# Patient Record
Sex: MALE | Race: WHITE | NOT HISPANIC OR LATINO | Employment: OTHER | URBAN - METROPOLITAN AREA
[De-identification: names, ages, dates, MRNs, and addresses within clinical notes are randomized per-mention and may not be internally consistent; named-entity substitution may affect disease eponyms.]

---

## 2018-07-29 ENCOUNTER — OFFICE VISIT (OUTPATIENT)
Dept: URGENT CARE | Facility: CLINIC | Age: 38
End: 2018-07-29
Payer: MEDICARE

## 2018-07-29 VITALS
OXYGEN SATURATION: 98 % | SYSTOLIC BLOOD PRESSURE: 125 MMHG | WEIGHT: 129 LBS | DIASTOLIC BLOOD PRESSURE: 87 MMHG | TEMPERATURE: 99 F | HEART RATE: 90 BPM | BODY MASS INDEX: 19.05 KG/M2

## 2018-07-29 DIAGNOSIS — L02.416 CELLULITIS AND ABSCESS OF LEFT LEG: Primary | ICD-10-CM

## 2018-07-29 DIAGNOSIS — L03.116 CELLULITIS AND ABSCESS OF LEFT LEG: Primary | ICD-10-CM

## 2018-07-29 DIAGNOSIS — Z72.0 TOBACCO USE: ICD-10-CM

## 2018-07-29 PROCEDURE — 3074F SYST BP LT 130 MM HG: CPT | Mod: S$GLB,,, | Performed by: FAMILY MEDICINE

## 2018-07-29 PROCEDURE — 3008F BODY MASS INDEX DOCD: CPT | Mod: S$GLB,,, | Performed by: FAMILY MEDICINE

## 2018-07-29 PROCEDURE — 99203 OFFICE O/P NEW LOW 30 MIN: CPT | Mod: S$GLB,,, | Performed by: FAMILY MEDICINE

## 2018-07-29 PROCEDURE — 3079F DIAST BP 80-89 MM HG: CPT | Mod: S$GLB,,, | Performed by: FAMILY MEDICINE

## 2018-07-29 RX ORDER — IBUPROFEN 800 MG/1
800 TABLET ORAL EVERY 8 HOURS PRN
Qty: 60 TABLET | Refills: 2 | Status: SHIPPED | OUTPATIENT
Start: 2018-07-29 | End: 2018-10-20 | Stop reason: SDUPTHER

## 2018-07-29 RX ORDER — SULFAMETHOXAZOLE AND TRIMETHOPRIM 800; 160 MG/1; MG/1
1 TABLET ORAL 2 TIMES DAILY
Qty: 20 TABLET | Refills: 0 | Status: SHIPPED | OUTPATIENT
Start: 2018-07-29 | End: 2018-08-08

## 2018-07-29 RX ORDER — MUPIROCIN 20 MG/G
OINTMENT TOPICAL
Qty: 22 G | Refills: 1 | Status: SHIPPED | OUTPATIENT
Start: 2018-07-29 | End: 2018-10-20

## 2018-07-29 RX ORDER — CHLORHEXIDINE GLUCONATE 40 MG/ML
SOLUTION TOPICAL DAILY PRN
Refills: 0 | COMMUNITY
Start: 2018-07-29 | End: 2018-10-20

## 2018-07-29 RX ORDER — CLINDAMYCIN PHOSPHATE 150 MG/ML
300 INJECTION, SOLUTION INTRAVENOUS
Status: COMPLETED | OUTPATIENT
Start: 2018-07-29 | End: 2018-07-29

## 2018-07-29 RX ADMIN — CLINDAMYCIN PHOSPHATE 300 MG: 150 INJECTION, SOLUTION INTRAVENOUS at 11:07

## 2018-07-29 NOTE — PATIENT INSTRUCTIONS
Go to the Emergency department for any worsening or failure to improve, otherwise follow up with your primary care provider.    Discharge Instructions for Cellulitis  You have been diagnosed with cellulitis. This is an infection in the deepest layer of the skin. In some cases, the infection also affects the muscle. Cellulitis is caused by bacteria. The bacteria can enter the body through broken skin. This can happen with a cut, scratch, animal bite, or an insect bite that has been scratched. You may have been treated in the hospital with antibiotics and fluids. You will likely be given a prescription for antibiotics to take at home. This sheet will help you take care of yourself at home.  Home care  When you are home:  · Take the prescribed antibiotic medicine you are given as directed until it is gone. Take it even if you feel better. It treats the infection and stops it from returning. Not taking all the medicine can make future infections hard to treat.  · Keep the infected area clean.  · When possible, raise the infected area above the level of your heart. This helps keep swelling down.  · Talk with your healthcare provider if you are in pain. Ask what kind of over-the-counter medicine you can take for pain.  · Apply clean bandages as advised.  · Take your temperature once a day for a week.  · Wash your hands often to prevent spreading the infection.  In the future, wash your hands before and after you touch cuts, scratches, or bandages. This will help prevent infection.   When to call your healthcare provider  Call your healthcare provider immediately if you have any of the following:  · Difficulty or pain when moving the joints above or below the infected area  · Discharge or pus draining from the area  · Fever of 100.4°F (38°C) or higher, or as directed by your healthcare provider  · Pain that gets worse in or around the infected   · Redness that gets worse in or around the infected area, particularly if  the area of redness expands to a wider area  · Shaking chills  · Swelling of the infected area  · Vomiting   Date Last Reviewed: 8/1/2016  © 8474-4552 [employer]. [address] [place] [address], [place] [address]. All rights reserved. This information is not intended as a substitute for professional medical care. Always follow your healthcare professional's instructions.

## 2018-07-29 NOTE — PROGRESS NOTES
Subjective:       Patient ID: Marcial Thomas is a 37 y.o. male.    Vitals:  weight is 58.5 kg (129 lb). His tympanic temperature is 99 °F (37.2 °C). His blood pressure is 125/87 and his pulse is 90. His oxygen saturation is 98%.     Chief Complaint: Abscess    Pt states that he some redness and swelling of the left calf and is painful.      Abscess   Chronicity:  NewProgression Since Onset: gradually worsening  Location:  Leg (left leg)  Associated Symptoms: no fever, no chills  Characteristics: painful, redness and swelling    Pain Scale:  7/10  Treatments Tried:  Nothing    Review of Systems   Constitution: Negative for chills and fever.   HENT: Negative for sore throat.    Eyes: Negative for blurred vision.   Cardiovascular: Negative for chest pain.   Respiratory: Negative for shortness of breath.    Skin: Negative for rash.   Musculoskeletal: Negative for back pain and joint pain.   Gastrointestinal: Negative for abdominal pain, diarrhea, nausea and vomiting.   Neurological: Negative for headaches.   Psychiatric/Behavioral: The patient is not nervous/anxious.    All other systems reviewed and are negative.      Objective:      Physical Exam   Constitutional: He is oriented to person, place, and time. He appears well-developed.  Non-toxic appearance.   HENT:   Head: Normocephalic.   Nose: Nose normal.   Mouth/Throat: Oropharynx is clear and moist.   Eyes: Conjunctivae and EOM are normal. Pupils are equal, round, and reactive to light.   Cardiovascular: Normal rate, regular rhythm, intact distal pulses and normal pulses.    Pulmonary/Chest: Breath sounds normal.   Abdominal: Bowel sounds are normal.   Musculoskeletal:        Left lower leg: He exhibits tenderness.   Negative homans sign   Neurological: He is alert and oriented to person, place, and time.   Skin: There is erythema.   Abscess/pre-abscess on left posterior lower leg (upper calf) with erythema and ttp.       Assessment:       1. Cellulitis and  abscess of left leg    2. Tobacco use        Plan:         Cellulitis and abscess of left leg  -     clindamycin injection 300 mg; Inject 2 mLs (300 mg total) into the muscle one time.  -     mupirocin (BACTROBAN) 2 % ointment; Apply to affected area 3 times daily  Dispense: 22 g; Refill: 1  -     sulfamethoxazole-trimethoprim 800-160mg (BACTRIM DS) 800-160 mg Tab; Take 1 tablet by mouth 2 (two) times daily. for 10 days  Dispense: 20 tablet; Refill: 0  -     ibuprofen (ADVIL,MOTRIN) 800 MG tablet; Take 1 tablet (800 mg total) by mouth every 8 (eight) hours as needed for Pain.  Dispense: 60 tablet; Refill: 2  -     chlorhexidine (HIBICLENS) 4 % external liquid; Apply topically daily as needed.; Refill: 0    Tobacco use  -     Ambulatory referral to Smoking Cessation Program      Patient Instructions       Go to the Emergency department for any worsening or failure to improve, otherwise follow up with your primary care provider.    Discharge Instructions for Cellulitis  You have been diagnosed with cellulitis. This is an infection in the deepest layer of the skin. In some cases, the infection also affects the muscle. Cellulitis is caused by bacteria. The bacteria can enter the body through broken skin. This can happen with a cut, scratch, animal bite, or an insect bite that has been scratched. You may have been treated in the hospital with antibiotics and fluids. You will likely be given a prescription for antibiotics to take at home. This sheet will help you take care of yourself at home.  Home care  When you are home:  · Take the prescribed antibiotic medicine you are given as directed until it is gone. Take it even if you feel better. It treats the infection and stops it from returning. Not taking all the medicine can make future infections hard to treat.  · Keep the infected area clean.  · When possible, raise the infected area above the level of your heart. This helps keep swelling down.  · Talk with your  healthcare provider if you are in pain. Ask what kind of over-the-counter medicine you can take for pain.  · Apply clean bandages as advised.  · Take your temperature once a day for a week.  · Wash your hands often to prevent spreading the infection.  In the future, wash your hands before and after you touch cuts, scratches, or bandages. This will help prevent infection.   When to call your healthcare provider  Call your healthcare provider immediately if you have any of the following:  · Difficulty or pain when moving the joints above or below the infected area  · Discharge or pus draining from the area  · Fever of 100.4°F (38°C) or higher, or as directed by your healthcare provider  · Pain that gets worse in or around the infected   · Redness that gets worse in or around the infected area, particularly if the area of redness expands to a wider area  · Shaking chills  · Swelling of the infected area  · Vomiting   Date Last Reviewed: 8/1/2016  © 0613-0893 [employer]. [address] [place] [address], [place] [address]. All rights reserved. This information is not intended as a substitute for professional medical care. Always follow your healthcare professional's instructions.
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
## 2018-10-20 ENCOUNTER — OFFICE VISIT (OUTPATIENT)
Dept: FAMILY MEDICINE | Facility: CLINIC | Age: 38
End: 2018-10-20
Payer: MEDICARE

## 2018-10-20 ENCOUNTER — HOSPITAL ENCOUNTER (OUTPATIENT)
Dept: RADIOLOGY | Facility: HOSPITAL | Age: 38
Discharge: HOME | End: 2018-10-20
Attending: INTERNAL MEDICINE
Payer: MEDICARE

## 2018-10-20 VITALS
BODY MASS INDEX: 18.77 KG/M2 | HEART RATE: 88 BPM | DIASTOLIC BLOOD PRESSURE: 88 MMHG | SYSTOLIC BLOOD PRESSURE: 110 MMHG | OXYGEN SATURATION: 98 % | WEIGHT: 126.75 LBS | HEIGHT: 69 IN | TEMPERATURE: 98 F

## 2018-10-20 DIAGNOSIS — Z76.89 ENCOUNTER TO ESTABLISH CARE: Primary | ICD-10-CM

## 2018-10-20 DIAGNOSIS — Z13.1 SCREENING FOR DIABETES MELLITUS: ICD-10-CM

## 2018-10-20 DIAGNOSIS — Z13.6 ENCOUNTER FOR SCREENING FOR CARDIOVASCULAR DISORDERS: ICD-10-CM

## 2018-10-20 DIAGNOSIS — M54.6 CHRONIC MIDLINE THORACIC BACK PAIN: ICD-10-CM

## 2018-10-20 DIAGNOSIS — G89.29 CHRONIC MIDLINE THORACIC BACK PAIN: ICD-10-CM

## 2018-10-20 DIAGNOSIS — R07.81 RIB PAIN ON RIGHT SIDE: ICD-10-CM

## 2018-10-20 PROCEDURE — 3079F DIAST BP 80-89 MM HG: CPT | Mod: ,,, | Performed by: INTERNAL MEDICINE

## 2018-10-20 PROCEDURE — 72070 X-RAY EXAM THORAC SPINE 2VWS: CPT | Mod: TC,FY,PO

## 2018-10-20 PROCEDURE — 3008F BODY MASS INDEX DOCD: CPT | Mod: ,,, | Performed by: INTERNAL MEDICINE

## 2018-10-20 PROCEDURE — 71100 X-RAY EXAM RIBS UNI 2 VIEWS: CPT | Mod: 26,,, | Performed by: RADIOLOGY

## 2018-10-20 PROCEDURE — 72070 X-RAY EXAM THORAC SPINE 2VWS: CPT | Mod: 26,,, | Performed by: RADIOLOGY

## 2018-10-20 PROCEDURE — 71100 X-RAY EXAM RIBS UNI 2 VIEWS: CPT | Mod: TC,FY,PO

## 2018-10-20 PROCEDURE — 99204 OFFICE O/P NEW MOD 45 MIN: CPT | Mod: S$PBB,,, | Performed by: INTERNAL MEDICINE

## 2018-10-20 PROCEDURE — 99213 OFFICE O/P EST LOW 20 MIN: CPT | Mod: PBBFAC,PO | Performed by: INTERNAL MEDICINE

## 2018-10-20 PROCEDURE — 99999 PR PBB SHADOW E&M-EST. PATIENT-LVL III: CPT | Mod: PBBFAC,,, | Performed by: INTERNAL MEDICINE

## 2018-10-20 PROCEDURE — 3074F SYST BP LT 130 MM HG: CPT | Mod: ,,, | Performed by: INTERNAL MEDICINE

## 2018-10-20 RX ORDER — METHYLPREDNISOLONE 4 MG/1
TABLET ORAL
Qty: 1 PACKAGE | Refills: 0 | Status: SHIPPED | OUTPATIENT
Start: 2018-10-20 | End: 2018-10-20 | Stop reason: SDUPTHER

## 2018-10-20 RX ORDER — CYCLOBENZAPRINE HCL 10 MG
10 TABLET ORAL 3 TIMES DAILY PRN
Qty: 14 TABLET | Refills: 0 | Status: SHIPPED | OUTPATIENT
Start: 2018-10-20 | End: 2018-10-31 | Stop reason: SDUPTHER

## 2018-10-20 RX ORDER — METHYLPREDNISOLONE 4 MG/1
TABLET ORAL
Qty: 1 PACKAGE | Refills: 0 | Status: SHIPPED | OUTPATIENT
Start: 2018-10-20 | End: 2018-10-25 | Stop reason: ALTCHOICE

## 2018-10-23 ENCOUNTER — TELEPHONE (OUTPATIENT)
Dept: FAMILY MEDICINE | Facility: CLINIC | Age: 38
End: 2018-10-23

## 2018-10-23 NOTE — TELEPHONE ENCOUNTER
----- Message from Javad Méndez MD sent at 10/22/2018  9:17 PM CDT -----  CBC with high WBC/PMN. Active smoker. Rib pain - acute phase reactant?  Mild elevated total protein though the albumin was OK. Very slight  TSH low though FT4 WNL no previous to compare  Lipid actually pretty good.    Please call pt - white blood cell count was high - unsure if this means anything. Would repeat this in 3 months. Important to follow this up.  Thyroid was slightly abnormal - would repeat this in 3 months too  Follow up 3 months.

## 2018-10-24 ENCOUNTER — HOSPITAL ENCOUNTER (EMERGENCY)
Facility: HOSPITAL | Age: 38
Discharge: LEFT BEFORE DISCHARGE | End: 2018-10-25
Attending: EMERGENCY MEDICINE
Payer: MEDICARE

## 2018-10-24 VITALS
RESPIRATION RATE: 16 BRPM | HEART RATE: 105 BPM | TEMPERATURE: 99 F | BODY MASS INDEX: 18.66 KG/M2 | DIASTOLIC BLOOD PRESSURE: 92 MMHG | WEIGHT: 126 LBS | HEIGHT: 69 IN | OXYGEN SATURATION: 98 % | SYSTOLIC BLOOD PRESSURE: 147 MMHG

## 2018-10-24 DIAGNOSIS — L02.416 CELLULITIS AND ABSCESS OF LEFT LOWER EXTREMITY: Primary | ICD-10-CM

## 2018-10-24 DIAGNOSIS — L03.116 CELLULITIS AND ABSCESS OF LEFT LOWER EXTREMITY: Primary | ICD-10-CM

## 2018-10-24 PROCEDURE — 85025 COMPLETE CBC W/AUTO DIFF WBC: CPT

## 2018-10-24 PROCEDURE — 99283 EMERGENCY DEPT VISIT LOW MDM: CPT | Mod: 25

## 2018-10-24 NOTE — TELEPHONE ENCOUNTER
Spoke with patient and was informed of charted test result with recommendation. Patient request a call back reminder for 3 months f/u with labs. Done.

## 2018-10-25 ENCOUNTER — OFFICE VISIT (OUTPATIENT)
Dept: FAMILY MEDICINE | Facility: CLINIC | Age: 38
End: 2018-10-25
Payer: MEDICARE

## 2018-10-25 VITALS
DIASTOLIC BLOOD PRESSURE: 84 MMHG | WEIGHT: 96.31 LBS | TEMPERATURE: 98 F | HEART RATE: 92 BPM | BODY MASS INDEX: 14.27 KG/M2 | OXYGEN SATURATION: 97 % | HEIGHT: 69 IN | SYSTOLIC BLOOD PRESSURE: 130 MMHG

## 2018-10-25 DIAGNOSIS — F11.90 OPIOID USE DISORDER: ICD-10-CM

## 2018-10-25 DIAGNOSIS — L02.416 ABSCESS OF LEFT LEG: Primary | ICD-10-CM

## 2018-10-25 LAB
BASOPHILS # BLD AUTO: 0.03 K/UL
BASOPHILS NFR BLD: 0.1 %
CRP SERPL-MCNC: 283.6 MG/L
DIFFERENTIAL METHOD: ABNORMAL
EOSINOPHIL # BLD AUTO: 0 K/UL
EOSINOPHIL NFR BLD: 0.2 %
ERYTHROCYTE [DISTWIDTH] IN BLOOD BY AUTOMATED COUNT: 15.3 %
ERYTHROCYTE [SEDIMENTATION RATE] IN BLOOD BY WESTERGREN METHOD: 20 MM/HR
HCT VFR BLD AUTO: 46.1 %
HGB BLD-MCNC: 15.7 G/DL
HIV1+2 IGG SERPL QL IA.RAPID: NEGATIVE
LACTATE SERPL-SCNC: 2.1 MMOL/L
LYMPHOCYTES # BLD AUTO: 1.7 K/UL
LYMPHOCYTES NFR BLD: 7.9 %
MCH RBC QN AUTO: 28.6 PG
MCHC RBC AUTO-ENTMCNC: 34.1 G/DL
MCV RBC AUTO: 84 FL
MONOCYTES # BLD AUTO: 1.8 K/UL
MONOCYTES NFR BLD: 8.4 %
NEUTROPHILS # BLD AUTO: 18.3 K/UL
NEUTROPHILS NFR BLD: 83.4 %
PLATELET # BLD AUTO: 300 K/UL
PMV BLD AUTO: 10.2 FL
RBC # BLD AUTO: 5.49 M/UL
WBC # BLD AUTO: 21.93 K/UL

## 2018-10-25 PROCEDURE — 86703 HIV-1/HIV-2 1 RESULT ANTBDY: CPT

## 2018-10-25 PROCEDURE — 86140 C-REACTIVE PROTEIN: CPT

## 2018-10-25 PROCEDURE — 3079F DIAST BP 80-89 MM HG: CPT | Mod: ,,, | Performed by: INTERNAL MEDICINE

## 2018-10-25 PROCEDURE — 3008F BODY MASS INDEX DOCD: CPT | Mod: ,,, | Performed by: INTERNAL MEDICINE

## 2018-10-25 PROCEDURE — 83605 ASSAY OF LACTIC ACID: CPT

## 2018-10-25 PROCEDURE — 96372 THER/PROPH/DIAG INJ SC/IM: CPT | Mod: PBBFAC,PO

## 2018-10-25 PROCEDURE — 99214 OFFICE O/P EST MOD 30 MIN: CPT | Mod: PBBFAC,25,PO | Performed by: INTERNAL MEDICINE

## 2018-10-25 PROCEDURE — 85652 RBC SED RATE AUTOMATED: CPT

## 2018-10-25 PROCEDURE — 99999 PR PBB SHADOW E&M-EST. PATIENT-LVL IV: CPT | Mod: PBBFAC,,, | Performed by: INTERNAL MEDICINE

## 2018-10-25 PROCEDURE — 87040 BLOOD CULTURE FOR BACTERIA: CPT

## 2018-10-25 PROCEDURE — 3075F SYST BP GE 130 - 139MM HG: CPT | Mod: ,,, | Performed by: INTERNAL MEDICINE

## 2018-10-25 PROCEDURE — 87070 CULTURE OTHR SPECIMN AEROBIC: CPT | Mod: 59

## 2018-10-25 PROCEDURE — 99214 OFFICE O/P EST MOD 30 MIN: CPT | Mod: S$PBB,,, | Performed by: INTERNAL MEDICINE

## 2018-10-25 RX ORDER — CEPHALEXIN 500 MG/1
500 CAPSULE ORAL EVERY 6 HOURS
Qty: 56 CAPSULE | Refills: 0 | Status: SHIPPED | OUTPATIENT
Start: 2018-10-25 | End: 2018-11-08

## 2018-10-25 RX ORDER — SULFAMETHOXAZOLE AND TRIMETHOPRIM 800; 160 MG/1; MG/1
2 TABLET ORAL 2 TIMES DAILY
Qty: 56 TABLET | Refills: 0 | Status: SHIPPED | OUTPATIENT
Start: 2018-10-25 | End: 2018-11-08

## 2018-10-25 RX ORDER — CEFTRIAXONE 1 G/1
1 INJECTION, POWDER, FOR SOLUTION INTRAMUSCULAR; INTRAVENOUS
Status: COMPLETED | OUTPATIENT
Start: 2018-10-25 | End: 2018-10-25

## 2018-10-25 RX ADMIN — CEFTRIAXONE SODIUM 1 G: 1 INJECTION, POWDER, FOR SOLUTION INTRAMUSCULAR; INTRAVENOUS at 04:10

## 2018-10-25 NOTE — ED TRIAGE NOTES
Patient presents to the ED via personal transportation with wife. Patient reports redness, swelling, and drainage to left lower leg x 3 days. Patient reports that patient injecting his leg 1- 2 weeks ago with pain medication. Patient states needle was new, but he did not clean leg prior to doing so. Denies fever, chills.

## 2018-10-25 NOTE — ED PROVIDER NOTES
"Encounter Date: 10/24/2018  SORT: This is a 38 y.o. male who presents for emergent consideration of abscess of the left lower leg x 1 week. Patient reports draining with "filet knife" earlier today.  Initial exam notable for abscess with surrounding erythema and warmth. Patient will be moved to a room when one is available. Orders placed.  NILES Stoddard PA-C     SCRIBE #1 NOTE: I, Milan Donato, am scribing for, and in the presence of,  Aaron Diaz NP. I have scribed the following portions of the note - Other sections scribed: HPI, ROS.       History     Chief Complaint   Patient presents with    Abscess     left lower leg. Pt states "I lanced it today".      CC: Abscess    HPI: This 38 y.o. Male with anxiety, HTN and neuromuscular disorder presents to the ED for an emergent evaluation of a L shin abscess which began x2-3 weeks ago and worsened x3 days ago. Pt reports "I got it lanced today." He admits to drug use including heroin and opiates but tries to do pain pills only. Pt complies with at home meds. He denies diabetes, fever, abdominal pain, diarrhea, nausea, dysuria, numbness or incontinence.      The history is provided by the patient. No  was used.     Review of patient's allergies indicates:   Allergen Reactions    Vicodin [hydrocodone-acetaminophen] Hives     Past Medical History:   Diagnosis Date    Anxiety     Hypertension     Neuromuscular disorder      Past Surgical History:   Procedure Laterality Date    cervicle fusion      EPIDURAL BLOCK INJECTION      lumbar x 2     Family History   Problem Relation Age of Onset    No Known Problems Mother     No Known Problems Father     No Known Problems Sister     No Known Problems Sister      Social History     Tobacco Use    Smoking status: Current Every Day Smoker     Packs/day: 1.00    Smokeless tobacco: Former User   Substance Use Topics    Alcohol use: No    Drug use: Yes     Comment: pain pills     Review of " Systems   Constitutional: Negative for chills and fever.   HENT: Negative for ear pain, rhinorrhea and sore throat.    Eyes: Negative for redness.   Respiratory: Negative for shortness of breath.    Cardiovascular: Negative for chest pain.   Gastrointestinal: Negative for abdominal pain, diarrhea, nausea and vomiting.   Genitourinary: Negative for dysuria, enuresis and hematuria.   Musculoskeletal: Negative for back pain and neck pain.   Skin: Positive for wound (L shin). Negative for rash.   Neurological: Negative for weakness, numbness and headaches.   Hematological: Does not bruise/bleed easily.       Physical Exam     Initial Vitals [10/24/18 2223]   BP Pulse Resp Temp SpO2   (!) 147/92 105 16 98.6 °F (37 °C) 98 %      MAP       --         Physical Exam    Nursing note and vitals reviewed.  Constitutional: He appears well-developed and well-nourished. He is not diaphoretic. No distress.   HENT:   Head: Normocephalic and atraumatic.   Right Ear: External ear normal.   Left Ear: External ear normal.   Nose: Nose normal.   Eyes: Conjunctivae and EOM are normal. Pupils are equal, round, and reactive to light. Right eye exhibits no discharge. Left eye exhibits no discharge. No scleral icterus.   Neck: Normal range of motion. Neck supple. No thyromegaly present. No tracheal deviation present. No JVD present.   Cardiovascular: Normal rate, regular rhythm, normal heart sounds and intact distal pulses. Exam reveals no gallop and no friction rub.    No murmur heard.  Pulmonary/Chest: Breath sounds normal. No stridor. No respiratory distress. He has no wheezes. He has no rhonchi. He has no rales. He exhibits no tenderness.   Abdominal: Soft. Bowel sounds are normal. He exhibits no distension and no mass. There is no tenderness. There is no rebound and no guarding.   Musculoskeletal: Normal range of motion. He exhibits no edema or tenderness.   Lymphadenopathy:     He has no cervical adenopathy.   Neurological: He is alert  and oriented to person, place, and time. He has normal strength and normal reflexes. He displays normal reflexes. No cranial nerve deficit or sensory deficit.   Skin: Skin is warm and dry. Lesion noted. No rash noted. There is erythema. No pallor.   Ulcerative wound to left lower leg with purulent drainage and surrounding induration.  No significant fluctuance.   Psychiatric: He has a normal mood and affect. His behavior is normal. Judgment and thought content normal.                      ED Course   Procedures  Labs Reviewed   CBC W/ AUTO DIFFERENTIAL - Abnormal; Notable for the following components:       Result Value    WBC 21.93 (*)     RDW 15.3 (*)     Gran # (ANC) 18.3 (*)     Mono # 1.8 (*)     Gran% 83.4 (*)     Lymph% 7.9 (*)     All other components within normal limits   SEDIMENTATION RATE - Abnormal; Notable for the following components:    Sed Rate 20 (*)     All other components within normal limits   C-REACTIVE PROTEIN - Abnormal; Notable for the following components:    .6 (*)     All other components within normal limits   CULTURE, AEROBIC  (SPECIFY SOURCE)   CULTURE, BLOOD   RAPID HIV   LACTIC ACID, PLASMA          Imaging Results          X-Ray Tibia Fibula 2 View Left (Final result)  Result time 10/25/18 00:49:50    Final result by Tono Ibarra MD (10/25/18 00:49:50)                 Impression:      See above.      Electronically signed by: Tono Ibarra MD  Date:    10/25/2018  Time:    00:49             Narrative:    EXAMINATION:  XR TIBIA FIBULA 2 VIEW LEFT    CLINICAL HISTORY:  Cellulitis of left lower limb    TECHNIQUE:  AP and lateral views of the left tibia and fibula were performed.    COMPARISON:  None.    FINDINGS:  Soft tissue edema with subcutaneous soft tissue air is seen at the medial aspect of the mid left lower leg.  Prominent soft tissue swelling is also visualized distally over the medial malleolus.    No evidence of acute fracture, dislocation, or osseous  destructive process.                                 Medical Decision Making:   History:   Old Medical Records: I decided to obtain old medical records.  Differential Diagnosis:   Cellulitis, necrotizing fasciitis, osteomyelitis, gas gangrene, abscess, sepsis, others  Clinical Tests:   Lab Tests: Ordered and Reviewed  Radiological Study: Ordered and Reviewed  ED Management:  38-year-old male presenting for evaluation of a wound to the left lower leg.  Patient is a history IV heroin abuse in including recent use.  Patient stays wound started when he missed a vein in his leg several weeks ago.  States that wound has been worsening over the course of the past several weeks.  He is uncertain if the needle he used had been use previously.  Ordered labs including blood cultures and imaging to evaluate for osteomyelitis.    Prior to workup being complete I informed the patient he would likely need to be admitted to the hospital for IV antibiotic administration.  After hearing this patient began to exhibit signs of anxiety and stated that he can't stay in the hospital.  I had a lengthy discussion with the patient during which I attempted to convince the patient about the importance of being admitted for treatment, however he states that he just needs to go and that he will come back soon. Patient will leave AMA.  Other:   I have discussed this case with another health care provider.       <> Summary of the Discussion: Case discussed with my attending physician, Krystina Bhakta MD who also evaluated the patient and agreed with the assessment and plan.            Scribe Attestation:   Scribe #1: I performed the above scribed service and the documentation accurately describes the services I performed. I attest to the accuracy of the note.    Attending Attestation:           Physician Attestation for Scribe:  Physician Attestation Statement for Scribe #1: I, Aaron Diaz NP, reviewed documentation, as scribed by Milan  Poplar in my presence, and it is both accurate and complete.                    Clinical Impression:   The encounter diagnosis was Cellulitis and abscess of left lower extremity.      Disposition:   Disposition: AMA  AMA: Patient left AMA after: MD assigned, HPI, exam, lab drawn, x-rays done, lab resulted and x-ray viewed. Patient refused: admission (IV antibiotics). Patient status: competent, oriented x 3 and not intoxicated. Alternative treatments were explained to the patient. The family was present when AMA discussions took place. Recommend: follow-up call and follow-up letter AMA Form: signed by patient                         Aaron Diaz NP  11/07/18 1552

## 2018-10-25 NOTE — PROGRESS NOTES
Patient tolerate Rocephin 1 gm IM injection . Patient advise to wait 15 min after injection  for assessment of any posssible side effects.

## 2018-10-25 NOTE — ED NOTES
Pt came out the room that he wanted to go home Aaron NP want into the pts room to talk to him and he stated that he wanted to leave Aaron explained that he would have to sign an ama form the pt understood. Upon taking out his iv he asked if he could go out side to smoke and I explained to the pt that he could not leave with an iv in his arm and he said that he needed to leave and he could not stay.

## 2018-10-25 NOTE — PROGRESS NOTES
This note was created by combination of typed  and Dragon dictation.  Transcription errors may be present.  If there are any questions, please contact me.    Assessment & Plan:   Abscess of left leg  -I did strongly recommend that he return back to the ER for consideration of admission for IV antibiotics and likely need for surgical consultation for debridement.  He had a bad experience with the ER and is declining to do so.  He would like to have at least a short trial of an injection antibiotic today and oral antibiotics.  I did discuss with him at length the risks including sepsis, worsening infection, etc.  He voiced understanding but would still decline going to the ER and getting admitted at this time.  1 g of Rocephin IM today.  Prescription for Keflex and Bactrim 14 day course.  Urgent referral to General surgery.  -     cefTRIAXone injection 1 g  -     cephALEXin (KEFLEX) 500 MG capsule; Take 1 capsule (500 mg total) by mouth every 6 (six) hours. for 14 days  Dispense: 56 capsule; Refill: 0  -     sulfamethoxazole-trimethoprim 800-160mg (BACTRIM DS) 800-160 mg Tab; Take 2 tablets by mouth 2 (two) times daily. for 14 days  Dispense: 56 tablet; Refill: 0  -     Ambulatory referral to General Surgery    Opioid use disorder  -we talked at length about the pathophysiology of chronic pain and it is difference from acute pain. He does not like the idea of maintenance opiate therapy just maintain as he did not find it helped out with pain very much.  This includes both methadone and Suboxone.  Again we talked about the pathophysiology and the differences of such.  Right now he is using dangerous substances in an uncontrolled matter with IV/subcu injection and resultant cellulitis.  I have given him the contact information for Addiction Medicine over at [facility].  He will consider contacting them.        Medications Discontinued During This Encounter   Medication Reason    methylPREDNISolone (MEDROL  DOSEPACK) 4 mg tablet Therapy completed       meds sent this encounter:  Medications Ordered This Encounter   Medications    cefTRIAXone injection 1 g    cephALEXin (KEFLEX) 500 MG capsule     Sig: Take 1 capsule (500 mg total) by mouth every 6 (six) hours. for 14 days     Dispense:  56 capsule     Refill:  0    sulfamethoxazole-trimethoprim 800-160mg (BACTRIM DS) 800-160 mg Tab     Sig: Take 2 tablets by mouth 2 (two) times daily. for 14 days     Dispense:  56 tablet     Refill:  0       Follow Up: No Follow-up on file.    Subjective:     Chief Complaint   Patient presents with    Abscess       HPI  Marcial is a 38 y.o. male, last appointment with this clinic was 10/20/2018.    HTN with hx of amlodipine on and off.  Worse with pain  Lumbar radiculopathy and facet arthropathy  Hx of cervical fusion 2006  History of chronic narcotics which he self discontinued as he did not feel like this would be helpful for him long term.  History of Depression  Smoker, 1/2 to 1 PPD x 25 years.  Never quit for longer than a month. Hx of nicotine replacement.  Feels too nervous and irritable.   Substance use disorder.    He presented to the ER yesterday with left leg abscess.  Unclear whether he had I+D as photos taken look like it was opened up.  He left AMA; had been advised admission  Blood cx NGTD. ESR and CRP high.   HIV negative. Lactic acid was normal. CBC significantly high WBC  XR does not clearly show osteomyelitis.    He left against medical advice because he felt that he was being treated differently because he has substance use disorder.  History of narcotic dependence, and when he could not afford to see pain clinic anymore, he resorted to using illicit narcotics, with IV drug use, typically injection in the legs.  With resultant infection.  He felt like he was being treated rudely at the emergency room and was being judged and he could not take it anymore and so then he left against medical advice.    I did discuss  with him at length today about my concerns about his leg, the risks for advancing cellulitis, that it probably needs extensive debridement, more than what I am comfortable performing here at clinic.  I did recommend to him that he present for admission again and he would consider going [place] to [facility] if he were to do so but does not feel like he can do so.  He would like to try IM antibiotics and an oral course of antibiotics 1st and if he does not improve or gets worse then he would be agreeable for present to the emergency room for check in.  I did discuss with him the risks of waiting including risks of worsening infection, sepsis/bacteremia etc.  He voiced understanding but would still like to proceed with outpatient therapy 1st.    Compared to yesterday morning his leg does look somewhat better he states.  He opened up the abscess himself with a knife and it did drain and it continues to drain and he feels like the swelling has improved.  No systemic fevers.    I reviewed with him the preliminary lab results thus far.  Blood cultures are negative.  The abscess culture is pending.  HIV negative.  White blood count is very high.    We talked at length about pathophysiology of chronic pain.  He does not find that methadone or Suboxone in the past has really helped him in regards to pain and does not feel like he wants to take them just to maintain substance dependency.  I did discuss with him that this could be a bridge to trying to wean to off and that currently using IV illicit substances is harmful for his health.  He's going to think about it.    Patient Care Team:  To Obtain Unable as PCP - General    Patient Active Problem List    Diagnosis Date Noted    Hypertension        PAST MEDICAL HISTORY:  Past Medical History:   Diagnosis Date    Anxiety     Hypertension     Neuromuscular disorder        PAST SURGICAL HISTORY:  Past Surgical History:   Procedure Laterality Date    cervicle  "fusion      EPIDURAL BLOCK INJECTION      lumbar x 2       SOCIAL HISTORY:  Social History     Socioeconomic History    Marital status:      Spouse name: Not on file    Number of children: Not on file    Years of education: Not on file    Highest education level: Not on file   Social Needs    Financial resource strain: Not on file    Food insecurity - worry: Not on file    Food insecurity - inability: Not on file    Transportation needs - medical: Not on file    Transportation needs - non-medical: Not on file   Occupational History    Occupation: side jobs/piece work.     Tobacco Use    Smoking status: Current Every Day Smoker     Packs/day: 1.00    Smokeless tobacco: Former User   Substance and Sexual Activity    Alcohol use: No    Drug use: Yes     Comment: pain pills    Sexual activity: Yes     Partners: Female   Other Topics Concern    Not on file   Social History Narrative    Not on file       ALLERGIES AND MEDICATIONS: updated and reviewed.  Review of patient's allergies indicates:   Allergen Reactions    Vicodin [hydrocodone-acetaminophen] Hives     Current Outpatient Medications   Medication Sig Dispense Refill    cyclobenzaprine (FLEXERIL) 10 MG tablet Take 1 tablet (10 mg total) by mouth 3 (three) times daily as needed for Muscle spasms. 14 tablet 0    methylPREDNISolone (MEDROL DOSEPACK) 4 mg tablet use as directed 1 Package 0     No current facility-administered medications for this visit.        Review of Systems   Constitutional: Negative for chills and fever.   Respiratory: Negative for shortness of breath.    Cardiovascular: Negative for chest pain.       Objective:   Physical Exam   Vitals:    10/25/18 1530   BP: 130/84   BP Location: Right arm   Patient Position: Sitting   BP Method: Small (Manual)   Pulse: 92   Temp: 98.3 °F (36.8 °C)   TempSrc: Oral   SpO2: 97%   Weight: 43.7 kg (96 lb 5.5 oz)   Height: 5' 9" (1.753 m)    Body mass index is 14.23 " "kg/m².  Weight: 43.7 kg (96 lb 5.5 oz)   Height: 5' 9" (175.3 cm)     Physical Exam   Constitutional: He is oriented to person, place, and time. He appears well-developed and well-nourished. No distress.   HENT:   Head: Normocephalic and atraumatic.   Eyes: No scleral icterus.   Pulmonary/Chest: Effort normal.   Neurological: He is alert and oriented to person, place, and time.   Skin:   The left lower leg is erythematous, swollen, warm, tender, the medial aspect of the leg with a large abscess open, draining purulent fluid, with fluctuance surrounding it.  Essentially no change from the photos taken at the ER yesterday   Psychiatric: He has a normal mood and affect. His behavior is normal. Thought content normal.     "

## 2018-10-27 LAB — BACTERIA SPEC AEROBE CULT: NORMAL

## 2018-10-30 ENCOUNTER — TELEPHONE (OUTPATIENT)
Dept: FAMILY MEDICINE | Facility: CLINIC | Age: 38
End: 2018-10-30

## 2018-10-30 LAB — BACTERIA BLD CULT: NORMAL

## 2018-10-31 DIAGNOSIS — R07.81 RIB PAIN ON RIGHT SIDE: ICD-10-CM

## 2018-10-31 DIAGNOSIS — G89.29 CHRONIC MIDLINE THORACIC BACK PAIN: ICD-10-CM

## 2018-10-31 DIAGNOSIS — M54.6 CHRONIC MIDLINE THORACIC BACK PAIN: ICD-10-CM

## 2018-10-31 RX ORDER — CYCLOBENZAPRINE HCL 10 MG
10 TABLET ORAL 3 TIMES DAILY PRN
Qty: 30 TABLET | Refills: 2 | Status: SHIPPED | OUTPATIENT
Start: 2018-10-31 | End: 2019-03-15

## 2018-10-31 NOTE — TELEPHONE ENCOUNTER
----- Message from Cyndie Arauz sent at 10/30/2018 12:58 PM CDT -----  Contact: Self/[phone]  Patient would like someone from staff to give him a call regarding pain medication.  Thank You    Refill:  cyclobenzaprine (FLEXERIL) 10 MG tablet    [facility] [address] [facility] [place]  [address] [address]    Thank you.

## 2018-10-31 NOTE — TELEPHONE ENCOUNTER
----- Message from Cyndie Arauz sent at 10/30/2018 12:58 PM CDT -----  Contact: Self/[phone]  Patient would like someone from staff to give him a call regarding pain medication.  Thank You    Refill:  cyclobenzaprine (FLEXERIL) 10 MG tablet    [facility] [address] [facility] [place]  [address] [address]    Thank you.

## 2018-11-02 DIAGNOSIS — B02.9 SHINGLES OUTBREAK: ICD-10-CM

## 2018-11-02 RX ORDER — GABAPENTIN 300 MG/1
300 CAPSULE ORAL 3 TIMES DAILY
Qty: 90 CAPSULE | Refills: 3 | Status: SHIPPED | OUTPATIENT
Start: 2018-11-02 | End: 2019-02-20 | Stop reason: SDUPTHER

## 2018-11-02 NOTE — TELEPHONE ENCOUNTER
I spoke with the patient regarding a General Surgery referral, he refuse to schedule stating that his issue has gotten better.

## 2018-11-02 NOTE — TELEPHONE ENCOUNTER
----- Message from Gwen Phelps sent at 11/2/2018  4:42 PM CDT -----  Contact: self  Refill: gabapentin (NEURONTIN) 300 MG capsule. Send to [facility] [phone]  Pt contact [phone]

## 2018-11-07 ENCOUNTER — DOCUMENTATION ONLY (OUTPATIENT)
Dept: PSYCHIATRY | Facility: HOSPITAL | Age: 38
End: 2018-11-07

## 2018-11-07 NOTE — PSYCH
SW contacted pt to review insurance benefits and schedule ABU start date.Pt did not answer. SW left message to return call.

## 2019-02-01 ENCOUNTER — TELEPHONE (OUTPATIENT)
Dept: FAMILY MEDICINE | Facility: CLINIC | Age: 39
End: 2019-02-01

## 2019-02-01 NOTE — TELEPHONE ENCOUNTER
----- Message from Javad Méndze MD sent at 2/1/2019  8:40 AM CST -----  Regarding: FW: needs 3 month f/u high PMN and low TSH  Please have him set up OV follow up    ----- Message -----  From: Javad Méndez MD  Sent: 1/20/2019  To: Javad Méndez MD  Subject: needs 3 month f/u high PMN and low TSH

## 2019-02-08 PROBLEM — F11.90 OPIOID USE DISORDER: Status: ACTIVE | Noted: 2019-02-08

## 2019-02-08 PROBLEM — Z98.1 HISTORY OF FUSION OF CERVICAL SPINE: Status: ACTIVE | Noted: 2019-02-08

## 2019-02-08 PROBLEM — F19.90 SUBSTANCE USE DISORDER: Status: ACTIVE | Noted: 2019-02-08

## 2019-02-08 PROBLEM — F17.200 SMOKER: Status: ACTIVE | Noted: 2019-02-08

## 2019-02-08 PROBLEM — M54.16 LUMBAR RADICULOPATHY: Status: ACTIVE | Noted: 2019-02-08

## 2019-02-20 DIAGNOSIS — B02.9 SHINGLES OUTBREAK: ICD-10-CM

## 2019-02-20 RX ORDER — GABAPENTIN 300 MG/1
600 CAPSULE ORAL 3 TIMES DAILY
Qty: 180 CAPSULE | Refills: 0 | Status: SHIPPED | OUTPATIENT
Start: 2019-02-20 | End: 2019-03-15

## 2019-02-20 NOTE — TELEPHONE ENCOUNTER
----- Message from Kindra Ruffin sent at 2/20/2019 11:32 AM CST -----  Contact: self - [phone]  Type: RX Refill Request    Who Called: self    Refill or New Rx: refill     RX Name and Strength: gabapentin (NEURONTIN) 300 MG capsule      Preferred Pharmacy with phone number: ...  [id number] - [place], [place] - [address] [address]  [address] [address]  [place] [address]  Phone: [phone] Fax: [phone]      Additional Information:  Pt asking to increase MG on medication. He states 300 MG is not touching the pain.

## 2019-03-14 ENCOUNTER — TELEPHONE (OUTPATIENT)
Dept: FAMILY MEDICINE | Facility: CLINIC | Age: 39
End: 2019-03-14

## 2019-03-14 NOTE — TELEPHONE ENCOUNTER
Patient return call. Spoke with patient and informed him that he will need office visit to assess abscess. Patient scheduled office visit for 3/15/19.

## 2019-03-14 NOTE — TELEPHONE ENCOUNTER
----- Message from Altagracia Odalis sent at 3/14/2019  9:44 AM CDT -----  Contact: Patient  Type:  Needs Medical Advice    Who Called:  Marcial  Symptoms (please be specific):  The patient has an Abscess on his right leg and would like something to be called into the pharmacy for him.   How long has patient had these symptoms:   2-3 day   Pharmacy name and phone #:    [id number] - [place], [place] - [address] [address]  [address] [address]  [facility] [address]  Phone: [phone] Fax: [phone]    Would the patient rather a call back or a response via [facility]?  Call back   Best Call Back Number: [phone]  Additional Information: n/a

## 2019-03-15 ENCOUNTER — OFFICE VISIT (OUTPATIENT)
Dept: FAMILY MEDICINE | Facility: CLINIC | Age: 39
End: 2019-03-15
Payer: MEDICARE

## 2019-03-15 VITALS
HEIGHT: 69 IN | WEIGHT: 124.69 LBS | TEMPERATURE: 99 F | BODY MASS INDEX: 18.47 KG/M2 | DIASTOLIC BLOOD PRESSURE: 78 MMHG | HEART RATE: 69 BPM | SYSTOLIC BLOOD PRESSURE: 110 MMHG | OXYGEN SATURATION: 97 %

## 2019-03-15 DIAGNOSIS — B02.9 SHINGLES OUTBREAK: ICD-10-CM

## 2019-03-15 DIAGNOSIS — L02.415 ABSCESS OF RIGHT LEG: Primary | ICD-10-CM

## 2019-03-15 PROCEDURE — 99214 PR OFFICE/OUTPT VISIT, EST, LEVL IV, 30-39 MIN: ICD-10-PCS | Mod: S$GLB,,, | Performed by: NURSE PRACTITIONER

## 2019-03-15 PROCEDURE — 3074F PR MOST RECENT SYSTOLIC BLOOD PRESSURE < 130 MM HG: ICD-10-PCS | Mod: S$GLB,,, | Performed by: NURSE PRACTITIONER

## 2019-03-15 PROCEDURE — 99999 PR PBB SHADOW E&M-EST. PATIENT-LVL IV: ICD-10-PCS | Mod: PBBFAC,,, | Performed by: NURSE PRACTITIONER

## 2019-03-15 PROCEDURE — 3008F PR BODY MASS INDEX (BMI) DOCUMENTED: ICD-10-PCS | Mod: S$GLB,,, | Performed by: NURSE PRACTITIONER

## 2019-03-15 PROCEDURE — 3078F DIAST BP <80 MM HG: CPT | Mod: S$GLB,,, | Performed by: NURSE PRACTITIONER

## 2019-03-15 PROCEDURE — 3078F PR MOST RECENT DIASTOLIC BLOOD PRESSURE < 80 MM HG: ICD-10-PCS | Mod: S$GLB,,, | Performed by: NURSE PRACTITIONER

## 2019-03-15 PROCEDURE — 3008F BODY MASS INDEX DOCD: CPT | Mod: S$GLB,,, | Performed by: NURSE PRACTITIONER

## 2019-03-15 PROCEDURE — 99214 OFFICE O/P EST MOD 30 MIN: CPT | Mod: S$GLB,,, | Performed by: NURSE PRACTITIONER

## 2019-03-15 PROCEDURE — 99999 PR PBB SHADOW E&M-EST. PATIENT-LVL IV: CPT | Mod: PBBFAC,,, | Performed by: NURSE PRACTITIONER

## 2019-03-15 PROCEDURE — 3074F SYST BP LT 130 MM HG: CPT | Mod: S$GLB,,, | Performed by: NURSE PRACTITIONER

## 2019-03-15 RX ORDER — GABAPENTIN 300 MG/1
600 CAPSULE ORAL 3 TIMES DAILY
Qty: 180 CAPSULE | Refills: 0 | Status: SHIPPED | OUTPATIENT
Start: 2019-03-15 | End: 2019-05-27 | Stop reason: SDUPTHER

## 2019-03-15 RX ORDER — SULFAMETHOXAZOLE AND TRIMETHOPRIM 800; 160 MG/1; MG/1
1 TABLET ORAL 2 TIMES DAILY
Qty: 20 TABLET | Refills: 0 | Status: SHIPPED | OUTPATIENT
Start: 2019-03-15 | End: 2019-03-25

## 2019-03-15 NOTE — PROGRESS NOTES
"Subjective:       Patient ID: Marcial Thomas is a 38 y.o. male.    Chief Complaint: Abscess (pt states abscess to right leg.)    Abscess   Chronicity:  RecurrentProgression Since Onset: worsened as the day progressed  Location:  Leg  Associated Symptoms: no fever, no chills  Characteristics: painful and swelling    Characteristics: not draining and no itching    Pain Scale:  4/10  Treatments Tried:  Nothing  Relieved by:  Nothing  Exacerbated by: touching.      Past Medical History:   Diagnosis Date    Anxiety     Hypertension     Neuromuscular disorder        Social History     Socioeconomic History    Marital status:      Spouse name: Not on file    Number of children: Not on file    Years of education: Not on file    Highest education level: Not on file   Social Needs    Financial resource strain: Not on file    Food insecurity - worry: Not on file    Food insecurity - inability: Not on file    Transportation needs - medical: Not on file    Transportation needs - non-medical: Not on file   Occupational History    Occupation: side jobs/piece work.     Tobacco Use    Smoking status: Current Every Day Smoker     Packs/day: 1.00    Smokeless tobacco: Former User   Substance and Sexual Activity    Alcohol use: No    Drug use: Yes     Comment: pain pills    Sexual activity: Yes     Partners: Female   Other Topics Concern    Not on file   Social History Narrative    Not on file       Past Surgical History:   Procedure Laterality Date    cervicle fusion      EPIDURAL BLOCK INJECTION      lumbar x 2       Review of Systems   Constitutional: Negative for chills and fever.   Skin: Positive for color change and wound.       Objective:   /78 (BP Location: Left arm, Patient Position: Sitting, BP Method: Medium (Manual))   Pulse 69   Temp 98.5 °F (36.9 °C) (Oral)   Ht 5' 9" (1.753 m)   Wt 56.5 kg (124 lb 10.7 oz)   SpO2 97%   BMI 18.41 kg/m²      Physical Exam "   Constitutional: He is oriented to person, place, and time. He appears well-developed and well-nourished.   HENT:   Head: Normocephalic and atraumatic.   Cardiovascular: Normal rate, regular rhythm and normal heart sounds.   Pulmonary/Chest: Effort normal and breath sounds normal.   Neurological: He is alert and oriented to person, place, and time.   Skin: He is not diaphoretic. No pallor.   Psychiatric: He has a normal mood and affect. His speech is normal and behavior is normal.             Assessment:       1. Abscess of right leg        Plan:       Marcial was seen today for abscess.    Diagnoses and all orders for this visit:    Abscess of right leg  -     sulfamethoxazole-trimethoprim 800-160mg (BACTRIM DS) 800-160 mg Tab; Take 1 tablet by mouth 2 (two) times daily. for 10 days  · Soak the wound in hot water or apply hot packs (small towel soaked in hot water) to the area for 20 minutes at a time. Do this 3 to 4 times a day.  · Do not cut, squeeze, or pop the boil yourself.  · Apply antibiotic cream or ointment to the skin 3 to 4 times a day, unless something else was prescribed. Some ointments include an antibiotic plus a pain reliever.  · If your doctor prescribed antibiotics, do not stop taking them until you have finished the medicine or the doctor tells you to stop.  · You may use an over-the-counter pain medicine to control pain, unless another pain medicine was prescribed. If you have chronic liver or kidney disease or ever had a stomach ulcer or gastrointestinal bleeding, talk with your doctor before using these any of these.      Follow-up if symptoms worsen or fail to improve.

## 2019-03-15 NOTE — TELEPHONE ENCOUNTER
LOV 10/25/18 with .    Patient here in office for OV with KARLOS Gonzalez requesting Gabapentin 300 MG.

## 2019-03-15 NOTE — PATIENT INSTRUCTIONS
Cellulitis  Cellulitis is an infection of the deep layers of skin. A break in the skin, such as a cut or scratch, can let bacteria under the skin. If the bacteria get to deep layers of the skin, it can be serious. If not treated, cellulitis can get into the bloodstream and lymph nodes. The infection can then spread throughout the body. This causes serious illness.  Cellulitis causes the affected skin to become red, swollen, warm, and sore. The reddened areas have a visible border. An open sore may leak fluid (pus). You may have a fever, chills, and pain.  Cellulitis is treated with antibiotics taken for 7 to 10 days. An open sore may be cleaned and covered with cool wet gauze. Symptoms should get better 1 to 2 days after treatment is started. Make sure to take all the antibiotics for the full number of days until they are gone. Keep taking the medicine even if your symptoms go away.  Home care  Follow these tips:  · Limit the use of the part of your body with cellulitis.   · If the infection is on your leg, keep your leg raised while sitting. This will help to reduce swelling.  · Take all of the antibiotic medicine exactly as directed until it is gone. Do not miss any doses, especially during the first 7 days. Dont stop taking the medicine when your symptoms get better.  · Keep the affected area clean and dry.  · Wash your hands with soap and warm water before and after touching your skin. Anyone else who touches your skin should also wash his or her hands. Don't share towels.  Follow-up care  Follow up with your healthcare provider, or as advised. If your infection does not go away on the first antibiotic, your healthcare provider will prescribe a different one.  When to seek medical advice  Call your healthcare provider right away if any of these occur:  · Red areas that spread  · Swelling or pain that gets worse  · Fluid leaking from the skin (pus)  · Fever higher of 100.4º F (38.0º C) or higher after 2 days  on antibiotics  Date Last Reviewed: 9/1/2016  © 3725-2866 [employer]. [address] [place] [address], [place] [address]. All rights reserved. This information is not intended as a substitute for professional medical care. Always follow your healthcare professional's instructions.        Abscess (Antibiotic Treatment Only)  An abscess (sometimes called a boil) happens when bacteria get trapped under the skin and start to grow. Pus forms inside the abscess as the body responds to the bacteria. An abscess can happen with an insect bite, ingrown hair, blocked oil gland, pimple, cyst, or puncture wound.  In the early stages, your wound may be red and tender. For this stage, you may get antibiotics. If the abscess does not get better with antibiotics, it will need to be drained with a small cut.  Home care  These tips will help you care for your abscess at home:  · Soak the wound in hot water or apply hot packs (small towel soaked in hot water) to the area for 20 minutes at a time. Do this 3 to 4 times a day.  · Do not cut, squeeze, or pop the boil yourself.  · Apply antibiotic cream or ointment to the skin 3 to 4 times a day, unless something else was prescribed. Some ointments include an antibiotic plus a pain reliever.  · If your doctor prescribed antibiotics, do not stop taking them until you have finished the medicine or the doctor tells you to stop.  · You may use an over-the-counter pain medicine to control pain, unless another pain medicine was prescribed. If you have chronic liver or kidney disease or ever had a stomach ulcer or gastrointestinal bleeding, talk with your doctor before using these any of these.  Follow-up care  Follow up with your healthcare provider, or as advised. Check your wound each day for the signs of worsening infection listed below.  When to seek medical advice  Get prompt medical attention if any of these occur:  · An increase in redness or swelling  · Red streaks in the skin  leading away from the abscess  · An increase in local pain or swelling  · Fever of 100.4ºF (38ºC) or higher, or as directed by your healthcare provider  · Pus or fluid coming from the abscess  · Boil returns after getting better  Date Last Reviewed: 9/1/2016  © 1451-9859 [employer]. [address] [place] [address], [place] [address]. All rights reserved. This information is not intended as a substitute for professional medical care. Always follow your healthcare professional's instructions.

## 2019-05-27 ENCOUNTER — OFFICE VISIT (OUTPATIENT)
Dept: FAMILY MEDICINE | Facility: CLINIC | Age: 39
End: 2019-05-27
Payer: MEDICARE

## 2019-05-27 VITALS
BODY MASS INDEX: 18.4 KG/M2 | WEIGHT: 124.25 LBS | SYSTOLIC BLOOD PRESSURE: 110 MMHG | OXYGEN SATURATION: 96 % | HEART RATE: 74 BPM | DIASTOLIC BLOOD PRESSURE: 88 MMHG | HEIGHT: 69 IN | TEMPERATURE: 98 F

## 2019-05-27 DIAGNOSIS — B02.9 SHINGLES OUTBREAK: ICD-10-CM

## 2019-05-27 DIAGNOSIS — F19.90 IVDU (INTRAVENOUS DRUG USER): ICD-10-CM

## 2019-05-27 DIAGNOSIS — F19.10 SUBSTANCE ABUSE: ICD-10-CM

## 2019-05-27 DIAGNOSIS — L03.116 CELLULITIS OF LEFT LOWER EXTREMITY: Primary | ICD-10-CM

## 2019-05-27 PROCEDURE — 99214 OFFICE O/P EST MOD 30 MIN: CPT | Mod: S$GLB,,, | Performed by: FAMILY MEDICINE

## 2019-05-27 PROCEDURE — 99999 PR PBB SHADOW E&M-EST. PATIENT-LVL III: ICD-10-PCS | Mod: PBBFAC,,, | Performed by: FAMILY MEDICINE

## 2019-05-27 PROCEDURE — 3074F SYST BP LT 130 MM HG: CPT | Mod: S$GLB,,, | Performed by: FAMILY MEDICINE

## 2019-05-27 PROCEDURE — 3079F PR MOST RECENT DIASTOLIC BLOOD PRESSURE 80-89 MM HG: ICD-10-PCS | Mod: S$GLB,,, | Performed by: FAMILY MEDICINE

## 2019-05-27 PROCEDURE — 3079F DIAST BP 80-89 MM HG: CPT | Mod: S$GLB,,, | Performed by: FAMILY MEDICINE

## 2019-05-27 PROCEDURE — 3008F BODY MASS INDEX DOCD: CPT | Mod: S$GLB,,, | Performed by: FAMILY MEDICINE

## 2019-05-27 PROCEDURE — 3074F PR MOST RECENT SYSTOLIC BLOOD PRESSURE < 130 MM HG: ICD-10-PCS | Mod: S$GLB,,, | Performed by: FAMILY MEDICINE

## 2019-05-27 PROCEDURE — 99999 PR PBB SHADOW E&M-EST. PATIENT-LVL III: CPT | Mod: PBBFAC,,, | Performed by: FAMILY MEDICINE

## 2019-05-27 PROCEDURE — 3008F PR BODY MASS INDEX (BMI) DOCUMENTED: ICD-10-PCS | Mod: S$GLB,,, | Performed by: FAMILY MEDICINE

## 2019-05-27 PROCEDURE — 99214 PR OFFICE/OUTPT VISIT, EST, LEVL IV, 30-39 MIN: ICD-10-PCS | Mod: S$GLB,,, | Performed by: FAMILY MEDICINE

## 2019-05-27 RX ORDER — GABAPENTIN 300 MG/1
600 CAPSULE ORAL 3 TIMES DAILY
Qty: 180 CAPSULE | Refills: 0 | Status: SHIPPED | OUTPATIENT
Start: 2019-05-27 | End: 2019-07-12

## 2019-05-27 RX ORDER — SULFAMETHOXAZOLE AND TRIMETHOPRIM 400; 80 MG/1; MG/1
1 TABLET ORAL 2 TIMES DAILY
Qty: 28 TABLET | Refills: 0 | Status: SHIPPED | OUTPATIENT
Start: 2019-05-27 | End: 2019-07-12

## 2019-05-27 NOTE — PROGRESS NOTES
Office Visit    Patient Name: Marcial Thomas    : 1980  MRN: [id number]      Assessment/Plan:  Marcial Thomas is a 38 y.o. male who presents today for :    Cellulitis of left lower extremity  -     sulfamethoxazole-trimethoprim 400-80mg (BACTRIM,SEPTRA) 400-80 mg per tablet; Take 1 tablet by mouth 2 (two) times daily.  Dispense: 28 tablet; Refill: 0    IVDU (intravenous drug user)    Substance abuse    -AFVSS - no systemic Sx - pain tolerable.   -Advised keeping area dry and clean with good skin hygiene, wash all sheets/towels/clothes after each episode, dispose of razors used in area, avoid scratching affected area, may do intermittent warm water soaks for additional comfort and elevate limb to help reduce swelling. Start Antibiotic, may use Tylenol PRN for pain.   -Advised patient to call clinic if pt has any concerns, ER precautions also provided to patient, especially if infection appears worse, or patient develops systemic Sx such as F/C/malaise/N/V.   -counseled to avoid IVDU due to recurrent infections - pt voiced udnerstanding. He still has contact info regarding Addiction medication over at [facility] that was previously given to him by PCP. Has not decided if he wants to go yet.      · Soak the wound in hot water or apply hot packs (small towel soaked in hot water) to the area for 20 minutes at a time. Do this 3 to 4 times a day.  · Do not cut, squeeze, or pop the boil yourself.  · Apply antibiotic cream or ointment to the skin 3 to 4 times a day, unless something else was prescribed. Some ointments include an antibiotic plus a pain reliever.  · If your doctor prescribed antibiotics, do not stop taking them until you have finished the medicine or the doctor tells you to stop.  · You may use an over-the-counter pain medicine to control pain, unless another pain medicine was prescribed. If you have chronic liver or kidney disease or ever had a stomach ulcer or gastrointestinal bleeding,  talk with your doctor before using these any of these.      Follow up for worsening Sx. Urgent care/ED precautions provided.     This note was created by combination of typed  and Dragon dictation.  Transcription errors may be present.  If there are any questions, please contact me.        ----------------------------------------------------------------------------------------------------------------------      HPI:  Patient Care Team:  Javad Méndez MD as PCP - General (Internal Medicine)    Marcial is a 38 y.o. male with      Patient Active Problem List   Diagnosis    Essential hypertension    History of fusion of cervical spine 2006    Smoker    Lumbar radiculopathy 7/2016 MRI with degeneration, mild central stenosis L3-4    Substance use disorder IVDU    Opioid use disorder    IVDU (intravenous drug user)    Substance abuse     This patient is new to me      Patient presents today for :  Abscess  On the L leg that started about 3 days ago - he has recurrent skin infections due to admitted history of IVDU, which he has had several conversations with his PCP about. His last occurrence was two months ago. He prefers to be on the Bactrim as it has worked better than other abx in the past. Denies any recent use of new shampoos/soaps/lotion/skin products/detergents. No F/C/wt changes/fatigue      Additional ROS      No dysphagia/sore throat/rhinorrhea  No CP/SOB/palpitations/swelling  No cough/wheezing/SOB  No nausea/vomiting/abd pain/no diarrhea  No MSK weakness/HA/tingling/numbness  No weakness/HA/tingling/numbness    Patient Care Team:  Javad Méndez MD as PCP - General (Internal Medicine)    Patient Active Problem List   Diagnosis    Essential hypertension    History of fusion of cervical spine 2006    Smoker    Lumbar radiculopathy 7/2016 MRI with degeneration, mild central stenosis L3-4    Substance use disorder IVDU    Opioid use disorder    IVDU (intravenous drug user)    Substance  abuse       Current Medications  Medications reviewed and updated.       Current Outpatient Medications:     gabapentin (NEURONTIN) 300 MG capsule, Take 2 capsules (600 mg total) by mouth 3 (three) times daily., Disp: 180 capsule, Rfl: 0    sulfamethoxazole-trimethoprim 400-80mg (BACTRIM,SEPTRA) 400-80 mg per tablet, Take 1 tablet by mouth 2 (two) times daily., Disp: 28 tablet, Rfl: 0    Past Surgical History:   Procedure Laterality Date    cervicle fusion      EPIDURAL BLOCK INJECTION      lumbar x 2       Family History   Problem Relation Age of Onset    No Known Problems Mother     No Known Problems Father     No Known Problems Sister     No Known Problems Sister        Social History     Socioeconomic History    Marital status:      Spouse name: Not on file    Number of children: Not on file    Years of education: Not on file    Highest education level: Not on file   Occupational History    Occupation: side jobs/piece work.     Social Needs    Financial resource strain: Not on file    Food insecurity:     Worry: Not on file     Inability: Not on file    Transportation needs:     Medical: Not on file     Non-medical: Not on file   Tobacco Use    Smoking status: Current Every Day Smoker     Packs/day: 1.00    Smokeless tobacco: Former User   Substance and Sexual Activity    Alcohol use: No    Drug use: Yes     Comment: pain pills    Sexual activity: Yes     Partners: Female   Lifestyle    Physical activity:     Days per week: Not on file     Minutes per session: Not on file    Stress: Not on file   Relationships    Social connections:     Talks on phone: Not on file     Gets together: Not on file     Attends Zoroastrianism service: Not on file     Active member of club or organization: Not on file     Attends meetings of clubs or organizations: Not on file     Relationship status: Not on file   Other Topics Concern    Not on file   Social History Narrative    Not on file  "          Allergies   Review of patient's allergies indicates:   Allergen Reactions    Vicodin [hydrocodone-acetaminophen] Hives             Review of Systems  See HPI      Physical Exam  /88   Pulse 74   Temp 98.1 °F (36.7 °C) (Oral)   Ht 5' 9" (1.753 m)   Wt 56.4 kg (124 lb 3.7 oz)   SpO2 96%   BMI 18.35 kg/m²     GEN: NAD, well developed, pleasant, well nourished  HEENT: NCAT, PERRLA, EOMI, sclera clear, anicteric, O/P clear, MMM with no lesions  NECK: normal, supple with midline trachea, no LAD, no thyromegaly  LUNGS: CTAB, no w/r/r, no increased work of breathing   HEART: RRR, normal S1 and S2, no m/r/g, no edema  ABD: s/nt/nd, NABS  SKIN: L mid-anterior LLE with 2x2cm oval-shaped induration with central erythema. Slightly warm to touch, moderately TTP.  NO fluctuance, discharge or drainage on close inspection. No other skin abnormality on gross examination.  MSK: warm/well perfused, normal ROM in all extremities, no c/c/e.              "

## 2019-05-27 NOTE — TELEPHONE ENCOUNTER
----- Message from Nahomi Cadence sent at 5/27/2019 12:30 PM CDT -----  Contact: self  [phone]  .Type: RX Refill Request    Who Called:  Self     Refill or New Rx: refill    RX Name and Strength: gabapentin (NEURONTIN) 300 MG capsule    Is this a 30 day or 90 day RX: 90 day     Preferred Pharmacy with phone number .  [id number] - [place], [place] - [address] [facility] [address]  [place] [address]  Phone: [phone] Fax: [phone]    Local or Mail Order: local      Would the patient rather a call back or a response via [facility]? Call back     Best Call Back Number:  [phone]

## 2019-07-12 ENCOUNTER — OFFICE VISIT (OUTPATIENT)
Dept: FAMILY MEDICINE | Facility: CLINIC | Age: 39
End: 2019-07-12
Payer: MEDICARE

## 2019-07-12 ENCOUNTER — HOSPITAL ENCOUNTER (OUTPATIENT)
Dept: RADIOLOGY | Facility: HOSPITAL | Age: 39
Discharge: HOME | End: 2019-07-12
Attending: INTERNAL MEDICINE
Payer: MEDICARE

## 2019-07-12 VITALS
SYSTOLIC BLOOD PRESSURE: 120 MMHG | WEIGHT: 119.19 LBS | BODY MASS INDEX: 17.65 KG/M2 | DIASTOLIC BLOOD PRESSURE: 82 MMHG | HEART RATE: 81 BPM | TEMPERATURE: 98 F | HEIGHT: 69 IN | OXYGEN SATURATION: 96 %

## 2019-07-12 DIAGNOSIS — M54.2 NECK PAIN, BILATERAL: ICD-10-CM

## 2019-07-12 DIAGNOSIS — J30.9 ALLERGIC SINUSITIS: Primary | ICD-10-CM

## 2019-07-12 DIAGNOSIS — F17.210 NICOTINE DEPENDENCE, CIGARETTES, UNCOMPLICATED: ICD-10-CM

## 2019-07-12 DIAGNOSIS — M54.12 CERVICAL RADICULOPATHY: ICD-10-CM

## 2019-07-12 DIAGNOSIS — F11.90 OPIOID USE DISORDER: ICD-10-CM

## 2019-07-12 PROCEDURE — 99406 BEHAV CHNG SMOKING 3-10 MIN: CPT | Mod: S$GLB,,, | Performed by: INTERNAL MEDICINE

## 2019-07-12 PROCEDURE — 96372 THER/PROPH/DIAG INJ SC/IM: CPT | Mod: S$GLB,,, | Performed by: INTERNAL MEDICINE

## 2019-07-12 PROCEDURE — 3074F SYST BP LT 130 MM HG: CPT | Mod: S$GLB,,, | Performed by: INTERNAL MEDICINE

## 2019-07-12 PROCEDURE — 3008F BODY MASS INDEX DOCD: CPT | Mod: S$GLB,,, | Performed by: INTERNAL MEDICINE

## 2019-07-12 PROCEDURE — 3079F PR MOST RECENT DIASTOLIC BLOOD PRESSURE 80-89 MM HG: ICD-10-PCS | Mod: S$GLB,,, | Performed by: INTERNAL MEDICINE

## 2019-07-12 PROCEDURE — 99999 PR PBB SHADOW E&M-EST. PATIENT-LVL IV: CPT | Mod: PBBFAC,,, | Performed by: INTERNAL MEDICINE

## 2019-07-12 PROCEDURE — 72040 XR CERVICAL SPINE AP LATERAL: ICD-10-PCS | Mod: 26,,, | Performed by: RADIOLOGY

## 2019-07-12 PROCEDURE — 96372 PR INJECTION,THERAP/PROPH/DIAG2ST, IM OR SUBCUT: ICD-10-PCS | Mod: S$GLB,,, | Performed by: INTERNAL MEDICINE

## 2019-07-12 PROCEDURE — 99406 PR TOBACCO USE CESSATION INTERMEDIATE 3-10 MINUTES: ICD-10-PCS | Mod: S$GLB,,, | Performed by: INTERNAL MEDICINE

## 2019-07-12 PROCEDURE — 99214 PR OFFICE/OUTPT VISIT, EST, LEVL IV, 30-39 MIN: ICD-10-PCS | Mod: 25,S$GLB,, | Performed by: INTERNAL MEDICINE

## 2019-07-12 PROCEDURE — 3074F PR MOST RECENT SYSTOLIC BLOOD PRESSURE < 130 MM HG: ICD-10-PCS | Mod: S$GLB,,, | Performed by: INTERNAL MEDICINE

## 2019-07-12 PROCEDURE — 99214 OFFICE O/P EST MOD 30 MIN: CPT | Mod: 25,S$GLB,, | Performed by: INTERNAL MEDICINE

## 2019-07-12 PROCEDURE — 3079F DIAST BP 80-89 MM HG: CPT | Mod: S$GLB,,, | Performed by: INTERNAL MEDICINE

## 2019-07-12 PROCEDURE — 99999 PR PBB SHADOW E&M-EST. PATIENT-LVL IV: ICD-10-PCS | Mod: PBBFAC,,, | Performed by: INTERNAL MEDICINE

## 2019-07-12 PROCEDURE — 72040 X-RAY EXAM NECK SPINE 2-3 VW: CPT | Mod: 26,,, | Performed by: RADIOLOGY

## 2019-07-12 PROCEDURE — 72040 X-RAY EXAM NECK SPINE 2-3 VW: CPT | Mod: TC,FY,PO

## 2019-07-12 PROCEDURE — 3008F PR BODY MASS INDEX (BMI) DOCUMENTED: ICD-10-PCS | Mod: S$GLB,,, | Performed by: INTERNAL MEDICINE

## 2019-07-12 RX ORDER — KETOROLAC TROMETHAMINE 30 MG/ML
60 INJECTION, SOLUTION INTRAMUSCULAR; INTRAVENOUS
Status: COMPLETED | OUTPATIENT
Start: 2019-07-12 | End: 2019-07-12

## 2019-07-12 RX ORDER — METHOCARBAMOL 750 MG/1
750 TABLET, FILM COATED ORAL 4 TIMES DAILY PRN
Qty: 40 TABLET | Refills: 0 | Status: SHIPPED | OUTPATIENT
Start: 2019-07-12 | End: 2019-08-14 | Stop reason: SDUPTHER

## 2019-07-12 RX ORDER — GABAPENTIN 800 MG/1
800 TABLET ORAL 3 TIMES DAILY PRN
Qty: 45 TABLET | Refills: 0 | Status: ON HOLD | OUTPATIENT
Start: 2019-07-12 | End: 2019-07-26 | Stop reason: HOSPADM

## 2019-07-12 RX ORDER — METHYLPREDNISOLONE 4 MG/1
TABLET ORAL
Qty: 1 PACKAGE | Refills: 0 | Status: SHIPPED | OUTPATIENT
Start: 2019-07-12 | End: 2019-07-25

## 2019-07-12 RX ORDER — KETOROLAC TROMETHAMINE 30 MG/ML
60 INJECTION, SOLUTION INTRAMUSCULAR; INTRAVENOUS
Status: SHIPPED | OUTPATIENT
Start: 2019-07-12 | End: 2019-07-15

## 2019-07-12 RX ADMIN — KETOROLAC TROMETHAMINE 60 MG: 30 INJECTION, SOLUTION INTRAMUSCULAR; INTRAVENOUS at 11:07

## 2019-07-12 NOTE — PROGRESS NOTES
Subjective:       Chief Complaint  Chief Complaint   Patient presents with    Neck Pain     x 2 weeks     nasal drip       HPI  Marcial Thomas is a 38 y.o. male with multiple medical diagnoses as listed in the medical history and problem list that presents for neck pain.     Neck pain  Severe neck pain for 2 weeks   Constant burning and sharp pain with radiation to left arm and right arm  No numbness - mild tingling   Neck spasms present  Using heat, showers and muscle rubs  Taking Aleve OTC and Tylenol  History of cervical spinal fusion in 2008 per outside Ortho - history of traumatic brain injury  No headaches     Nasal drip  Occurring for several days    Has been dealing with pain from abscessed molars he states recently     Patient Care Team:  Javad Méndez MD as PCP - General (Internal Medicine)      PAST MEDICAL HISTORY:  Past Medical History:   Diagnosis Date    Anxiety     Hypertension     Neuromuscular disorder        PAST SURGICAL HISTORY:  Past Surgical History:   Procedure Laterality Date    cervicle fusion      EPIDURAL BLOCK INJECTION      lumbar x 2       SOCIAL HISTORY:  Social History     Socioeconomic History    Marital status:      Spouse name: Not on file    Number of children: Not on file    Years of education: Not on file    Highest education level: Not on file   Occupational History    Occupation: side jobs/piece work.     Social Needs    Financial resource strain: Not on file    Food insecurity:     Worry: Not on file     Inability: Not on file    Transportation needs:     Medical: Not on file     Non-medical: Not on file   Tobacco Use    Smoking status: Current Every Day Smoker     Packs/day: 1.00    Smokeless tobacco: Former User   Substance and Sexual Activity    Alcohol use: No    Drug use: Yes     Comment: pain pills    Sexual activity: Yes     Partners: Female   Lifestyle    Physical activity:     Days per week: Not on file     Minutes  "per session: Not on file    Stress: Not on file   Relationships    Social connections:     Talks on phone: Not on file     Gets together: Not on file     Attends Pentecostalism service: Not on file     Active member of club or organization: Not on file     Attends meetings of clubs or organizations: Not on file     Relationship status: Not on file   Other Topics Concern    Not on file   Social History Narrative    Not on file       FAMILY HISTORY:  Family History   Problem Relation Age of Onset    No Known Problems Mother     No Known Problems Father     No Known Problems Sister     No Known Problems Sister        ALLERGIES AND MEDICATIONS: updated and reviewed.  Review of patient's allergies indicates:   Allergen Reactions    Vicodin [hydrocodone-acetaminophen] Hives     Current Outpatient Medications   Medication Sig Dispense Refill    gabapentin (NEURONTIN) 800 MG tablet Take 1 tablet (800 mg total) by mouth 3 (three) times daily as needed. 45 tablet 0    methocarbamol (ROBAXIN) 750 MG Tab Take 1 tablet (750 mg total) by mouth 4 (four) times daily as needed. 40 tablet 0    methylPREDNISolone (MEDROL DOSEPACK) 4 mg tablet use as directed 1 Package 0     Current Facility-Administered Medications   Medication Dose Route Frequency Provider Last Rate Last Dose    ketorolac injection 60 mg  60 mg Intramuscular 1 time in Clinic/HOD Ciro Tolentino MD        ketorolac injection 60 mg  60 mg Intramuscular 1 time in Clinic/HOD Ciro Tolentino MD             ROS  Review of Systems      Objective:       Physical Exam  Vitals:    07/12/19 1026   BP: 120/82   BP Location: Right arm   Patient Position: Sitting   BP Method: Medium (Manual)   Pulse: 81   Temp: 97.7 °F (36.5 °C)   TempSrc: Oral   SpO2: 96%   Weight: 54.1 kg (119 lb 2.5 oz)   Height: 5' 9" (1.753 m)    Body mass index is 17.6 kg/m².  Weight: 54.1 kg (119 lb 2.5 oz)   Height: 5' 9" (175.3 cm)   Physical Exam   Constitutional: He appears well-developed " and well-nourished.   HENT:   Head: Normocephalic and atraumatic.   Musculoskeletal: He exhibits tenderness (bilateral trapezii, base of occiput, bilateral SCM region). He exhibits no deformity.   Severely limited ROM with neck extension, side bending and lateral rotation (L worse than R)   Neurological: He is alert. He displays normal reflexes. No sensory deficit. He exhibits normal muscle tone.   Skin:   Excoriations of face           Assessment:     1. Allergic sinusitis    2. Cervical radiculopathy    3. Neck pain, bilateral    4. Nicotine dependence, cigarettes, uncomplicated    5. Opioid use disorder      Plan:     Marcial was seen today for neck pain and nasal drip.    Diagnoses and all orders for this visit:      Cervical radiculopathy  Neck pain, bilateral  Discussed likely etiology of acute/subacute neck pain   Obtain imaging presently  Trial gabapentin, muscle relaxants  would avoid opiates for acute pain per mutual discussion  Discussed interventional pain management modalities which he would be amenable to pursing - referral placed  -     gabapentin (NEURONTIN) 800 MG tablet; Take 1 tablet (800 mg total) by mouth 3 (three) times daily as needed.  -     Ambulatory referral to Pain Clinic  -     X-Ray Cervical Spine AP And Lateral; Future  -     methocarbamol (ROBAXIN) 750 MG Tab; Take 1 tablet (750 mg total) by mouth 4 (four) times daily as needed.  -     ketorolac injection 60 mg  -     ketorolac injection 60 mg    Opioid use disorder  Patient with history of IVDU - following up with PCP regarding    Nicotine dependence  The patient was counseled on the dangers of tobacco use, and was Counseled for 3-10 minutes.. The patient was advised to quit Reviewed strategies to maximize success, including Patches.     Allergic sinusitis  Start steroid burst for severe symptoms  -     methylPREDNISolone (MEDROL DOSEPACK) 4 mg tablet; use as directed      ADDENDUM: received call from Dr Cesar/Radiology regarding  "prevertebral soft tissue swelling notable on plain films which could be evaluated further with CT neck with contrast ("Prevertebral soft tissue the level of C5 through C7 and thickening of the aryepiglottic folds concerning for infectious/inflammatory process"). Contacted patient regarding potential need for additional imaging and will follow-up regarding    Health Maintenance       Date Due Completion Date    TETANUS VACCINE 07/12/2020 (Originally 7/31/1996) ---    Pneumococcal Vaccine (Medium Risk) (1 of 1 - PPSV23) 07/12/2020 (Originally 7/31/1997) ---    Influenza Vaccine 08/01/2019 ---    Lipid Panel 10/20/2023 10/20/2018            Health Maintenance reviewed, addressed as per orders    Follow up if symptoms worsen or fail to improve.    The patient expressed understanding and no barriers to adherence were identified.     1. The patient indicates understanding of these issues and agrees with the plan. Brief care plan is updated and reviewed with the patient as applicable.     2. The patient is given an After Visit Summary that lists all medications with directions, allergies, orders placed during this encounter and follow-up instructions.     3. I have reviewed the patient's medical information including past medical, family, and social history sections including the medications and allergies.     4. We discussed the patient's current medications. I reconciled the patient's medication list and prepared and supplied needed refills.       Ciro Tolentino MD  Internal Medicine-Pediatrics      "

## 2019-07-12 NOTE — PROGRESS NOTES
Patient tolerated injection well.  Instructed to remain in lobby for 15 minutes and to report any adverse reactions right away.  Verbalized understanding.

## 2019-07-12 NOTE — PATIENT INSTRUCTIONS
Please contact our Referrals Coordinator at [phone] if you have not received a call within 5 business days to check on the status of your referral (INTERVENTIONAL PAIN MANAGEMENT/DR MURILLO)

## 2019-07-15 ENCOUNTER — NURSE TRIAGE (OUTPATIENT)
Dept: ADMINISTRATIVE | Facility: CLINIC | Age: 39
End: 2019-07-15

## 2019-07-15 ENCOUNTER — TELEPHONE (OUTPATIENT)
Dept: FAMILY MEDICINE | Facility: CLINIC | Age: 39
End: 2019-07-15

## 2019-07-15 NOTE — TELEPHONE ENCOUNTER
Reason for Disposition   Weakness of an arm or hand    Protocols used: NECK PAIN OR [id number]

## 2019-07-15 NOTE — TELEPHONE ENCOUNTER
Spoke with patient and he states that he is having real bad shoulder pain , Pain scale of a 10. Patient was on the phone crying stating that he can not handle the pain anymore. I asked the patient if he went to the ER on 07/12/2019 as he was instructed. Patient stated he did not go to ER because everytime he goes to the ER they referral him back to his PCP. After speaking to Dr. Tolentino in regards to the patient's concerns, he recommends that the patient go to ER. Patient got upset and said he rather jump off of a fucking bridge than go to the ER and the patient hung up the phone.

## 2019-07-16 ENCOUNTER — TELEPHONE (OUTPATIENT)
Dept: FAMILY MEDICINE | Facility: CLINIC | Age: 39
End: 2019-07-16

## 2019-07-16 NOTE — TELEPHONE ENCOUNTER
Called patient to follow-up (patient seen in clinic on 7/12 for neck pain/swelling) - he states he went to [facility] ED yesterday and was given steroids but did not get a CT scan - he is still experiencing excruciating neck pain/swelling/difficulty swallowing - he had a critical XR of the neck showing large area of soft tissue thickening from C5 - C7 concerning for infectious/inflammatory process    Instructed patient to precede to [facility] immediately given risks of airway compromise and extension of infection    He was recommend to have a CT scan - this was not done at [facility] however    Patient expressed understanding - called to notify ED regarding

## 2019-07-16 NOTE — TELEPHONE ENCOUNTER
----- Message from Ellyn Segovia sent at 7/16/2019  7:32 AM CDT -----  Contact: Self   Type: Patient Call Back    Who called: Self     What is the request in detail:patient says he had to go to the ED again and would like to speak with the doctor about the steroid he received on friday and the abscess he has.     Can the clinic reply by [facility]? No     Would the patient rather a call back or a response via [facility]?Call    Best call back number:[phone]

## 2019-07-17 ENCOUNTER — HOSPITAL ENCOUNTER (INPATIENT)
Facility: HOSPITAL | Age: 39
LOS: 3 days | Discharge: LEFT BEFORE DISCHARGE | DRG: 907 | End: 2019-07-20
Attending: EMERGENCY MEDICINE | Admitting: EMERGENCY MEDICINE
Payer: MEDICARE

## 2019-07-17 ENCOUNTER — TELEPHONE (OUTPATIENT)
Dept: FAMILY MEDICINE | Facility: CLINIC | Age: 39
End: 2019-07-17

## 2019-07-17 DIAGNOSIS — R00.0 TACHYCARDIA: ICD-10-CM

## 2019-07-17 DIAGNOSIS — L02.91 ABSCESS: Primary | ICD-10-CM

## 2019-07-17 DIAGNOSIS — R07.9 CHEST PAIN: ICD-10-CM

## 2019-07-17 DIAGNOSIS — A41.9 SEPSIS: ICD-10-CM

## 2019-07-17 DIAGNOSIS — L02.11 ABSCESS, NECK: ICD-10-CM

## 2019-07-17 DIAGNOSIS — J39.0 RETROPHARYNGEAL ABSCESS: ICD-10-CM

## 2019-07-17 DIAGNOSIS — S02.672A: ICD-10-CM

## 2019-07-17 LAB
ALBUMIN SERPL BCP-MCNC: 2.8 G/DL (ref 3.5–5.2)
ALP SERPL-CCNC: 88 U/L (ref 55–135)
ALT SERPL W/O P-5'-P-CCNC: 11 U/L (ref 10–44)
ANION GAP SERPL CALC-SCNC: 11 MMOL/L (ref 8–16)
AST SERPL-CCNC: 10 U/L (ref 10–40)
BACTERIA #/AREA URNS HPF: ABNORMAL /HPF
BASOPHILS # BLD AUTO: 0.02 K/UL (ref 0–0.2)
BASOPHILS NFR BLD: 0.1 % (ref 0–1.9)
BILIRUB SERPL-MCNC: 0.3 MG/DL (ref 0.1–1)
BILIRUB UR QL STRIP: NEGATIVE
BUN SERPL-MCNC: 12 MG/DL (ref 6–20)
CALCIUM SERPL-MCNC: 9.3 MG/DL (ref 8.7–10.5)
CHLORIDE SERPL-SCNC: 99 MMOL/L (ref 95–110)
CLARITY UR: CLEAR
CO2 SERPL-SCNC: 25 MMOL/L (ref 23–29)
COLOR UR: YELLOW
CREAT SERPL-MCNC: 0.9 MG/DL (ref 0.5–1.4)
DIFFERENTIAL METHOD: ABNORMAL
EOSINOPHIL # BLD AUTO: 0 K/UL (ref 0–0.5)
EOSINOPHIL NFR BLD: 0 % (ref 0–8)
ERYTHROCYTE [DISTWIDTH] IN BLOOD BY AUTOMATED COUNT: 14.4 % (ref 11.5–14.5)
EST. GFR  (AFRICAN AMERICAN): >60 ML/MIN/1.73 M^2
EST. GFR  (NON AFRICAN AMERICAN): >60 ML/MIN/1.73 M^2
GLUCOSE SERPL-MCNC: 119 MG/DL (ref 70–110)
GLUCOSE UR QL STRIP: NEGATIVE
GRAN CASTS #/AREA URNS LPF: 1 /LPF
HCT VFR BLD AUTO: 36.3 % (ref 40–54)
HGB BLD-MCNC: 11.8 G/DL (ref 14–18)
HGB UR QL STRIP: ABNORMAL
HYALINE CASTS #/AREA URNS LPF: 2 /LPF
KETONES UR QL STRIP: NEGATIVE
LACTATE SERPL-SCNC: 1.2 MMOL/L (ref 0.5–2.2)
LEUKOCYTE ESTERASE UR QL STRIP: NEGATIVE
LYMPHOCYTES # BLD AUTO: 2.5 K/UL (ref 1–4.8)
LYMPHOCYTES NFR BLD: 7.7 % (ref 18–48)
MCH RBC QN AUTO: 29 PG (ref 27–31)
MCHC RBC AUTO-ENTMCNC: 32.5 G/DL (ref 32–36)
MCV RBC AUTO: 89 FL (ref 82–98)
MICROSCOPIC COMMENT: ABNORMAL
MONOCYTES # BLD AUTO: 2.6 K/UL (ref 0.3–1)
MONOCYTES NFR BLD: 8 % (ref 4–15)
NEUTROPHILS # BLD AUTO: 27.4 K/UL (ref 1.8–7.7)
NEUTROPHILS NFR BLD: 84.9 % (ref 38–73)
NITRITE UR QL STRIP: NEGATIVE
PH UR STRIP: 5 [PH] (ref 5–8)
PLATELET # BLD AUTO: 658 K/UL (ref 150–350)
PMV BLD AUTO: 9.5 FL (ref 9.2–12.9)
POTASSIUM SERPL-SCNC: 3.9 MMOL/L (ref 3.5–5.1)
PROT SERPL-MCNC: 8.2 G/DL (ref 6–8.4)
PROT UR QL STRIP: ABNORMAL
RBC # BLD AUTO: 4.07 M/UL (ref 4.6–6.2)
RBC #/AREA URNS HPF: 5 /HPF (ref 0–4)
SODIUM SERPL-SCNC: 135 MMOL/L (ref 136–145)
SP GR UR STRIP: 1.01 (ref 1–1.03)
SQUAMOUS #/AREA URNS HPF: 10 /HPF
TOXIC GRANULES BLD QL SMEAR: PRESENT
URN SPEC COLLECT METH UR: ABNORMAL
UROBILINOGEN UR STRIP-ACNC: NEGATIVE EU/DL
WBC # BLD AUTO: 32.49 K/UL (ref 3.9–12.7)
WBC #/AREA URNS HPF: 2 /HPF (ref 0–5)

## 2019-07-17 PROCEDURE — 80053 COMPREHEN METABOLIC PANEL: CPT

## 2019-07-17 PROCEDURE — 25000003 PHARM REV CODE 250: Performed by: EMERGENCY MEDICINE

## 2019-07-17 PROCEDURE — 85025 COMPLETE CBC W/AUTO DIFF WBC: CPT

## 2019-07-17 PROCEDURE — 25500020 PHARM REV CODE 255: Performed by: EMERGENCY MEDICINE

## 2019-07-17 PROCEDURE — 81000 URINALYSIS NONAUTO W/SCOPE: CPT

## 2019-07-17 PROCEDURE — 63600175 PHARM REV CODE 636 W HCPCS: Performed by: EMERGENCY MEDICINE

## 2019-07-17 PROCEDURE — 93005 ELECTROCARDIOGRAM TRACING: CPT

## 2019-07-17 PROCEDURE — 93010 ELECTROCARDIOGRAM REPORT: CPT | Mod: ,,, | Performed by: INTERNAL MEDICINE

## 2019-07-17 PROCEDURE — 87040 BLOOD CULTURE FOR BACTERIA: CPT | Mod: 59

## 2019-07-17 PROCEDURE — 87077 CULTURE AEROBIC IDENTIFY: CPT

## 2019-07-17 PROCEDURE — 12000002 HC ACUTE/MED SURGE SEMI-PRIVATE ROOM

## 2019-07-17 PROCEDURE — 93010 EKG 12-LEAD: ICD-10-PCS | Mod: ,,, | Performed by: INTERNAL MEDICINE

## 2019-07-17 PROCEDURE — 87186 SC STD MICRODIL/AGAR DIL: CPT

## 2019-07-17 PROCEDURE — 83605 ASSAY OF LACTIC ACID: CPT

## 2019-07-17 RX ORDER — VANCOMYCIN HCL IN 5 % DEXTROSE 1G/250ML
1000 PLASTIC BAG, INJECTION (ML) INTRAVENOUS
Status: DISCONTINUED | OUTPATIENT
Start: 2019-07-17 | End: 2019-07-18 | Stop reason: ALTCHOICE

## 2019-07-17 RX ORDER — FENTANYL CITRATE 50 UG/ML
100 INJECTION, SOLUTION INTRAMUSCULAR; INTRAVENOUS
Status: COMPLETED | OUTPATIENT
Start: 2019-07-17 | End: 2019-07-17

## 2019-07-17 RX ORDER — ONDANSETRON 2 MG/ML
8 INJECTION INTRAMUSCULAR; INTRAVENOUS
Status: COMPLETED | OUTPATIENT
Start: 2019-07-17 | End: 2019-07-17

## 2019-07-17 RX ORDER — IBUPROFEN 200 MG
1 TABLET ORAL
Status: COMPLETED | OUTPATIENT
Start: 2019-07-18 | End: 2019-07-19

## 2019-07-17 RX ORDER — HYDROMORPHONE HYDROCHLORIDE 2 MG/ML
1 INJECTION, SOLUTION INTRAMUSCULAR; INTRAVENOUS; SUBCUTANEOUS
Status: COMPLETED | OUTPATIENT
Start: 2019-07-17 | End: 2019-07-17

## 2019-07-17 RX ORDER — VANCOMYCIN HCL IN 5 % DEXTROSE 1G/250ML
20 PLASTIC BAG, INJECTION (ML) INTRAVENOUS
Status: COMPLETED | OUTPATIENT
Start: 2019-07-17 | End: 2019-07-18

## 2019-07-17 RX ADMIN — PIPERACILLIN AND TAZOBACTAM 4.5 G: 4; .5 INJECTION, POWDER, LYOPHILIZED, FOR SOLUTION INTRAVENOUS; PARENTERAL at 10:07

## 2019-07-17 RX ADMIN — HYDROMORPHONE HYDROCHLORIDE 1 MG: 2 INJECTION, SOLUTION INTRAMUSCULAR; INTRAVENOUS; SUBCUTANEOUS at 10:07

## 2019-07-17 RX ADMIN — IOHEXOL 70 ML: 350 INJECTION, SOLUTION INTRAVENOUS at 10:07

## 2019-07-17 RX ADMIN — FENTANYL CITRATE 100 MCG: 50 INJECTION, SOLUTION INTRAMUSCULAR; INTRAVENOUS at 07:07

## 2019-07-17 RX ADMIN — SODIUM CHLORIDE 1000 ML: 0.9 INJECTION, SOLUTION INTRAVENOUS at 06:07

## 2019-07-17 RX ADMIN — HYDROMORPHONE HYDROCHLORIDE 1 MG: 2 INJECTION, SOLUTION INTRAMUSCULAR; INTRAVENOUS; SUBCUTANEOUS at 06:07

## 2019-07-17 RX ADMIN — ONDANSETRON 8 MG: 2 INJECTION INTRAMUSCULAR; INTRAVENOUS at 06:07

## 2019-07-17 RX ADMIN — VANCOMYCIN HYDROCHLORIDE 1000 MG: 1 INJECTION, POWDER, LYOPHILIZED, FOR SOLUTION INTRAVENOUS at 11:07

## 2019-07-17 NOTE — TELEPHONE ENCOUNTER
Pt is requesting if he can drink a smoothie.States he was instructed to go the ER today by pcp. Pt wanted to clarify with PCP. Pt stated he hasn't eaten in the past day but did not want to cause any damage.  Advised pt per Dr. Méndez that he can drink a smoothie. Pt verbalized understanding.

## 2019-07-17 NOTE — TELEPHONE ENCOUNTER
CT done yesterday shows:    Large fluid collection with a few foci of gas centered in the prevertebral soft tissues anterior to the C3-C7 levels, highly suspicious for prevertebral abscess.  The larynx is displaced anteriorly.    Anterior fusion of C5-C6.    Findings were discussed with Ramiro Carrasco MD on 7/16/2019 at 1530 hours.  Additional contrast enhanced CT images of the neck were recommended.  However, at 1555 hours, Dr. Carrasco informed me that the patient elected to leave the emergency department before the contrast enhanced images were performed.    Spoke with pt - needs to go to ER for eval and likely admission.  Pt very sensitive - does not like going to ERs. Has found interactions with ER staff to be problematic in the past.  But he will go. Declined calling 911 but will get the money to get gas money to go to ER but promises me that he will go today.    Discussed possible complications of the abscess with pt including asphyxiation and death. Or osteomyelitis.

## 2019-07-17 NOTE — ED TRIAGE NOTES
Pt presents to the ED with c/o chronic neck and back pain. States he had a CT that was done yesterday and that was how they noticed the abscess on his neck. Pt states it has become hard to swallow and that he cannot keep any food or liquids down. Pt also reports muscle tension in the area of his neck and upper chest. Denies SOB. Denies n/v/d. Pt currently restless.
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
## 2019-07-18 PROBLEM — B95.8 BACTEREMIA DUE TO STAPHYLOCOCCUS: Status: ACTIVE | Noted: 2019-07-18

## 2019-07-18 PROBLEM — L02.91 ABSCESS: Status: ACTIVE | Noted: 2019-07-18

## 2019-07-18 PROBLEM — E04.1 NODULE OF LEFT LOBE OF THYROID GLAND: Status: ACTIVE | Noted: 2019-07-18

## 2019-07-18 PROBLEM — R78.81 BACTEREMIA DUE TO STAPHYLOCOCCUS: Status: ACTIVE | Noted: 2019-07-18

## 2019-07-18 PROCEDURE — 63600175 PHARM REV CODE 636 W HCPCS: Performed by: INTERNAL MEDICINE

## 2019-07-18 PROCEDURE — 63600175 PHARM REV CODE 636 W HCPCS: Performed by: EMERGENCY MEDICINE

## 2019-07-18 PROCEDURE — 25000003 PHARM REV CODE 250: Performed by: PHYSICIAN ASSISTANT

## 2019-07-18 PROCEDURE — 96361 HYDRATE IV INFUSION ADD-ON: CPT

## 2019-07-18 PROCEDURE — 63600175 PHARM REV CODE 636 W HCPCS: Performed by: STUDENT IN AN ORGANIZED HEALTH CARE EDUCATION/TRAINING PROGRAM

## 2019-07-18 PROCEDURE — 96365 THER/PROPH/DIAG IV INF INIT: CPT

## 2019-07-18 PROCEDURE — 25000003 PHARM REV CODE 250: Performed by: STUDENT IN AN ORGANIZED HEALTH CARE EDUCATION/TRAINING PROGRAM

## 2019-07-18 PROCEDURE — 96367 TX/PROPH/DG ADDL SEQ IV INF: CPT

## 2019-07-18 PROCEDURE — 99285 EMERGENCY DEPT VISIT HI MDM: CPT | Mod: 25

## 2019-07-18 PROCEDURE — S4991 NICOTINE PATCH NONLEGEND: HCPCS | Performed by: EMERGENCY MEDICINE

## 2019-07-18 PROCEDURE — 99223 1ST HOSP IP/OBS HIGH 75: CPT | Mod: GC,,, | Performed by: INTERNAL MEDICINE

## 2019-07-18 PROCEDURE — S4991 NICOTINE PATCH NONLEGEND: HCPCS | Performed by: STUDENT IN AN ORGANIZED HEALTH CARE EDUCATION/TRAINING PROGRAM

## 2019-07-18 PROCEDURE — 99223 PR INITIAL HOSPITAL CARE,LEVL III: ICD-10-PCS | Mod: ,,, | Performed by: INTERNAL MEDICINE

## 2019-07-18 PROCEDURE — 99223 1ST HOSP IP/OBS HIGH 75: CPT | Mod: ,,, | Performed by: INTERNAL MEDICINE

## 2019-07-18 PROCEDURE — 99499 NO LOS: ICD-10-PCS | Mod: ,,, | Performed by: OTOLARYNGOLOGY

## 2019-07-18 PROCEDURE — 63600175 PHARM REV CODE 636 W HCPCS: Performed by: PHYSICIAN ASSISTANT

## 2019-07-18 PROCEDURE — 99499 UNLISTED E&M SERVICE: CPT | Mod: ,,, | Performed by: OTOLARYNGOLOGY

## 2019-07-18 PROCEDURE — 25000003 PHARM REV CODE 250: Performed by: EMERGENCY MEDICINE

## 2019-07-18 PROCEDURE — 96375 TX/PRO/DX INJ NEW DRUG ADDON: CPT

## 2019-07-18 PROCEDURE — 96376 TX/PRO/DX INJ SAME DRUG ADON: CPT

## 2019-07-18 PROCEDURE — 25000003 PHARM REV CODE 250: Performed by: INTERNAL MEDICINE

## 2019-07-18 PROCEDURE — A9585 GADOBUTROL INJECTION: HCPCS | Performed by: INTERNAL MEDICINE

## 2019-07-18 PROCEDURE — 11000001 HC ACUTE MED/SURG PRIVATE ROOM

## 2019-07-18 PROCEDURE — 25500020 PHARM REV CODE 255: Performed by: INTERNAL MEDICINE

## 2019-07-18 PROCEDURE — 99223 PR INITIAL HOSPITAL CARE,LEVL III: ICD-10-PCS | Mod: GC,,, | Performed by: INTERNAL MEDICINE

## 2019-07-18 RX ORDER — HYDROMORPHONE HYDROCHLORIDE 1 MG/ML
1 INJECTION, SOLUTION INTRAMUSCULAR; INTRAVENOUS; SUBCUTANEOUS
Status: COMPLETED | OUTPATIENT
Start: 2019-07-18 | End: 2019-07-18

## 2019-07-18 RX ORDER — HYDROMORPHONE HYDROCHLORIDE 1 MG/ML
1 INJECTION, SOLUTION INTRAMUSCULAR; INTRAVENOUS; SUBCUTANEOUS ONCE
Status: COMPLETED | OUTPATIENT
Start: 2019-07-18 | End: 2019-07-18

## 2019-07-18 RX ORDER — SODIUM CHLORIDE 0.9 % (FLUSH) 0.9 %
10 SYRINGE (ML) INJECTION
Status: DISCONTINUED | OUTPATIENT
Start: 2019-07-18 | End: 2019-07-20 | Stop reason: HOSPADM

## 2019-07-18 RX ORDER — LORAZEPAM 0.5 MG/1
1 TABLET ORAL ONCE
Status: DISCONTINUED | OUTPATIENT
Start: 2019-07-18 | End: 2019-07-20

## 2019-07-18 RX ORDER — KETOROLAC TROMETHAMINE 30 MG/ML
30 INJECTION, SOLUTION INTRAMUSCULAR; INTRAVENOUS ONCE
Status: COMPLETED | OUTPATIENT
Start: 2019-07-18 | End: 2019-07-18

## 2019-07-18 RX ORDER — FENTANYL CITRATE 50 UG/ML
100 INJECTION, SOLUTION INTRAMUSCULAR; INTRAVENOUS
Status: DISPENSED | OUTPATIENT
Start: 2019-07-18 | End: 2019-07-18

## 2019-07-18 RX ORDER — HYDROMORPHONE HYDROCHLORIDE 1 MG/ML
1 INJECTION, SOLUTION INTRAMUSCULAR; INTRAVENOUS; SUBCUTANEOUS EVERY 6 HOURS PRN
Status: DISCONTINUED | OUTPATIENT
Start: 2019-07-18 | End: 2019-07-20

## 2019-07-18 RX ORDER — GADOBUTROL 604.72 MG/ML
6 INJECTION INTRAVENOUS
Status: COMPLETED | OUTPATIENT
Start: 2019-07-18 | End: 2019-07-18

## 2019-07-18 RX ORDER — HALOPERIDOL 5 MG/ML
5 INJECTION INTRAMUSCULAR
Status: COMPLETED | OUTPATIENT
Start: 2019-07-18 | End: 2019-07-18

## 2019-07-18 RX ORDER — ONDANSETRON 2 MG/ML
4 INJECTION INTRAMUSCULAR; INTRAVENOUS
Status: COMPLETED | OUTPATIENT
Start: 2019-07-18 | End: 2019-07-18

## 2019-07-18 RX ORDER — VANCOMYCIN HCL IN 5 % DEXTROSE 1G/250ML
20 PLASTIC BAG, INJECTION (ML) INTRAVENOUS
Status: DISCONTINUED | OUTPATIENT
Start: 2019-07-18 | End: 2019-07-19

## 2019-07-18 RX ORDER — POLYETHYLENE GLYCOL 3350 17 G/17G
17 POWDER, FOR SOLUTION ORAL DAILY
Status: DISCONTINUED | OUTPATIENT
Start: 2019-07-18 | End: 2019-07-20 | Stop reason: HOSPADM

## 2019-07-18 RX ORDER — IBUPROFEN 200 MG
1 TABLET ORAL ONCE
Status: COMPLETED | OUTPATIENT
Start: 2019-07-18 | End: 2019-07-19

## 2019-07-18 RX ORDER — HYDROMORPHONE HYDROCHLORIDE 2 MG/ML
2 INJECTION, SOLUTION INTRAMUSCULAR; INTRAVENOUS; SUBCUTANEOUS
Status: COMPLETED | OUTPATIENT
Start: 2019-07-18 | End: 2019-07-18

## 2019-07-18 RX ORDER — GLUCAGON 1 MG
1 KIT INJECTION
Status: DISCONTINUED | OUTPATIENT
Start: 2019-07-18 | End: 2019-07-20 | Stop reason: HOSPADM

## 2019-07-18 RX ORDER — SODIUM CHLORIDE 9 MG/ML
INJECTION, SOLUTION INTRAVENOUS CONTINUOUS
Status: ACTIVE | OUTPATIENT
Start: 2019-07-18 | End: 2019-07-18

## 2019-07-18 RX ORDER — IBUPROFEN 200 MG
24 TABLET ORAL
Status: DISCONTINUED | OUTPATIENT
Start: 2019-07-18 | End: 2019-07-20 | Stop reason: HOSPADM

## 2019-07-18 RX ORDER — AMOXICILLIN 250 MG
1 CAPSULE ORAL 2 TIMES DAILY
Status: DISCONTINUED | OUTPATIENT
Start: 2019-07-18 | End: 2019-07-20 | Stop reason: HOSPADM

## 2019-07-18 RX ORDER — IBUPROFEN 200 MG
16 TABLET ORAL
Status: DISCONTINUED | OUTPATIENT
Start: 2019-07-18 | End: 2019-07-20 | Stop reason: HOSPADM

## 2019-07-18 RX ADMIN — HALOPERIDOL LACTATE 5 MG: 5 INJECTION, SOLUTION INTRAMUSCULAR at 12:07

## 2019-07-18 RX ADMIN — VANCOMYCIN HYDROCHLORIDE 1000 MG: 1 INJECTION, POWDER, LYOPHILIZED, FOR SOLUTION INTRAVENOUS at 11:07

## 2019-07-18 RX ADMIN — VANCOMYCIN HYDROCHLORIDE 1000 MG: 1 INJECTION, POWDER, LYOPHILIZED, FOR SOLUTION INTRAVENOUS at 12:07

## 2019-07-18 RX ADMIN — HYDROMORPHONE HYDROCHLORIDE 1 MG: 1 INJECTION, SOLUTION INTRAMUSCULAR; INTRAVENOUS; SUBCUTANEOUS at 03:07

## 2019-07-18 RX ADMIN — HYDROMORPHONE HYDROCHLORIDE 1 MG: 1 INJECTION, SOLUTION INTRAMUSCULAR; INTRAVENOUS; SUBCUTANEOUS at 11:07

## 2019-07-18 RX ADMIN — ONDANSETRON 4 MG: 2 INJECTION INTRAMUSCULAR; INTRAVENOUS at 01:07

## 2019-07-18 RX ADMIN — HYDROMORPHONE HYDROCHLORIDE 1 MG: 1 INJECTION, SOLUTION INTRAMUSCULAR; INTRAVENOUS; SUBCUTANEOUS at 05:07

## 2019-07-18 RX ADMIN — HYDROMORPHONE HYDROCHLORIDE 1 MG: 1 INJECTION, SOLUTION INTRAMUSCULAR; INTRAVENOUS; SUBCUTANEOUS at 07:07

## 2019-07-18 RX ADMIN — DEXTROSE 6 G: 5 SOLUTION INTRAVENOUS at 06:07

## 2019-07-18 RX ADMIN — NICOTINE 1 PATCH: 21 PATCH, EXTENDED RELEASE TRANSDERMAL at 12:07

## 2019-07-18 RX ADMIN — NICOTINE 1 PATCH: 21 PATCH, EXTENDED RELEASE TRANSDERMAL at 10:07

## 2019-07-18 RX ADMIN — KETOROLAC TROMETHAMINE 30 MG: 30 INJECTION, SOLUTION INTRAMUSCULAR at 05:07

## 2019-07-18 RX ADMIN — DEXAMETHASONE SODIUM PHOSPHATE 8 MG: 4 INJECTION, SOLUTION INTRAMUSCULAR; INTRAVENOUS at 05:07

## 2019-07-18 RX ADMIN — DEXAMETHASONE SODIUM PHOSPHATE 8 MG: 4 INJECTION, SOLUTION INTRAMUSCULAR; INTRAVENOUS at 10:07

## 2019-07-18 RX ADMIN — DEXAMETHASONE SODIUM PHOSPHATE 8 MG: 4 INJECTION, SOLUTION INTRAMUSCULAR; INTRAVENOUS at 07:07

## 2019-07-18 RX ADMIN — SODIUM CHLORIDE: 0.9 INJECTION, SOLUTION INTRAVENOUS at 05:07

## 2019-07-18 RX ADMIN — HYDROMORPHONE HYDROCHLORIDE 2 MG: 2 INJECTION, SOLUTION INTRAMUSCULAR; INTRAVENOUS; SUBCUTANEOUS at 01:07

## 2019-07-18 RX ADMIN — GADOBUTROL 6 ML: 604.72 INJECTION INTRAVENOUS at 04:07

## 2019-07-18 NOTE — ED NOTES
Pt complaining of pain from his neck down through his back rating pain 10 out of 10. Pt requesting pain medication. Physician notified.

## 2019-07-18 NOTE — SUBJECTIVE & OBJECTIVE
Past Medical History:   Diagnosis Date    Anxiety     Hypertension     Neuromuscular disorder        Past Surgical History:   Procedure Laterality Date    cervicle fusion      EPIDURAL BLOCK INJECTION      lumbar x 2       Review of patient's allergies indicates:   Allergen Reactions    Vicodin [hydrocodone-acetaminophen] Hives       Medications:    (Not in a hospital admission)  Antibiotics (From admission, onward)    Start     Stop Route Frequency Ordered    07/18/19 1315  ceFAZolin (ANCEF) 6 g in dextrose 5 % 500 mL CONTINUOUS INFUSION      -- IV Every 24 hours (non-standard times) 07/18/19 1201    07/18/19 1130  vancomycin in dextrose 5 % 1 gram/250 mL IVPB 1,000 mg  (vancomycin with pharmacy to dose consult)      -- IV Every 12 hours (non-standard times) 07/18/19 1025        Antifungals (From admission, onward)    None        Antivirals (From admission, onward)    None             There is no immunization history on file for this patient.    Family History     Problem Relation (Age of Onset)    No Known Problems Mother, Father, Sister, Sister        Social History     Socioeconomic History    Marital status:      Spouse name: Not on file    Number of children: Not on file    Years of education: Not on file    Highest education level: Not on file   Occupational History    Occupation: side jobs/piece work.     Social Needs    Financial resource strain: Not on file    Food insecurity:     Worry: Not on file     Inability: Not on file    Transportation needs:     Medical: Not on file     Non-medical: Not on file   Tobacco Use    Smoking status: Current Every Day Smoker     Packs/day: 1.00    Smokeless tobacco: Former User   Substance and Sexual Activity    Alcohol use: No    Drug use: Yes     Comment: pain pills    Sexual activity: Yes     Partners: Female   Lifestyle    Physical activity:     Days per week: Not on file     Minutes per session: Not on file    Stress: Not on  file   Relationships    Social connections:     Talks on phone: Not on file     Gets together: Not on file     Attends Islam service: Not on file     Active member of club or organization: Not on file     Attends meetings of clubs or organizations: Not on file     Relationship status: Not on file   Other Topics Concern    Not on file   Social History Narrative    Not on file     Review of Systems   Constitutional: Positive for chills and fever.   HENT: Positive for trouble swallowing.         (+) dysphagia   Respiratory: Negative for cough and shortness of breath.    Cardiovascular: Negative for chest pain and leg swelling.   Gastrointestinal: Negative for abdominal pain, constipation, diarrhea, nausea and vomiting.   Genitourinary: Negative for dysuria, frequency and hematuria.   Musculoskeletal: Positive for neck pain. Negative for back pain and myalgias.   Skin: Negative for rash and wound.   Neurological: Negative for dizziness, light-headedness and headaches.   Psychiatric/Behavioral: Negative for agitation and behavioral problems. The patient is not nervous/anxious.      Objective:     Vital Signs (Most Recent):  Temp: 99.6 °F (37.6 °C) (07/18/19 1445)  Pulse: 86 (07/18/19 1535)  Resp: 20 (07/18/19 1440)  BP: (!) 155/96 (07/18/19 1440)  SpO2: 100 % (07/18/19 1440) Vital Signs (24h Range):  Temp:  [98.3 °F (36.8 °C)-100.8 °F (38.2 °C)] 99.6 °F (37.6 °C)  Pulse:  [] 86  Resp:  [12-24] 20  SpO2:  [92 %-100 %] 100 %  BP: (119-187)/() 155/96     Weight: 54.4 kg (120 lb)  Body mass index is 17.72 kg/m².    Estimated Creatinine Clearance: 84 mL/min (based on SCr of 0.9 mg/dL).    Physical Exam   Constitutional: He is oriented to person, place, and time. He appears well-developed and well-nourished. No distress.   Neck: Muscular tenderness present. Edema and decreased range of motion present. No erythema present.   Cardiovascular: Normal rate and regular rhythm.   No murmur heard.  Pulmonary/Chest:  Effort normal and breath sounds normal. No respiratory distress.   Abdominal: Soft. Bowel sounds are normal. He exhibits no distension.   Musculoskeletal: He exhibits no edema.   Lymphadenopathy:        Head (right side): No submental, no submandibular, no tonsillar, no preauricular and no occipital adenopathy present.        Head (left side): No submental, no submandibular, no tonsillar, no preauricular and no occipital adenopathy present.     He has no cervical adenopathy.   Neurological: He is alert and oriented to person, place, and time.   Skin: Skin is warm and dry.   Multiple scabs noted to arms, legs, and face in various stages of healing. No erythema or fluctuance noted.    Psychiatric: He has a normal mood and affect. His behavior is normal.       Significant Labs:   Blood Culture:   Recent Labs   Lab 07/17/19 1812 07/17/19  1845   LABBLOO Gram stain aer bottle and  Gram stain yoav bottle: Gram positive cocci in   clusters resembling Staph   Results called to and read back by: Jammie Wilkes RN at [facility] ED    07/18/2019  09:43 Gram stain yoav bottle: Gram positive cocci in clusters resembling Staph   Positive results previously called 07/18/2019  09:45     CBC:   Recent Labs   Lab 07/17/19  1812   WBC 32.49*   HGB 11.8*   HCT 36.3*   *     CMP:   Recent Labs   Lab 07/17/19  2138   *   K 3.9   CL 99   CO2 25   *   BUN 12   CREATININE 0.9   CALCIUM 9.3   PROT 8.2   ALBUMIN 2.8*   BILITOT 0.3   ALKPHOS 88   AST 10   ALT 11   ANIONGAP 11   EGFRNONAA >60     Wound Culture: No results for input(s): LABAERO in the last 4320 hours.    Significant Imaging: I have reviewed all pertinent imaging results/findings within the past 24 hours.
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
## 2019-07-18 NOTE — CARE UPDATE
Patient completed MRI without any difficulty. Nurse on 11th floor updated and transport to bring up to room. Tele monitor back on patient.
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
## 2019-07-18 NOTE — ED NOTES
Pt resting in bed at this time. Pt stating his pain is still a 10 out of 10 but is a little bit better after last dose of pain medication.
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
## 2019-07-18 NOTE — SUBJECTIVE & OBJECTIVE
(Not in a hospital admission)    Review of patient's allergies indicates:   Allergen Reactions    Vicodin [hydrocodone-acetaminophen] Hives       Past Medical History:   Diagnosis Date    Anxiety     Hypertension     Neuromuscular disorder      Past Surgical History:   Procedure Laterality Date    cervicle fusion      EPIDURAL BLOCK INJECTION      lumbar x 2     Family History     Problem Relation (Age of Onset)    No Known Problems Mother, Father, Sister, Sister        Tobacco Use    Smoking status: Current Every Day Smoker     Packs/day: 1.00    Smokeless tobacco: Former User   Substance and Sexual Activity    Alcohol use: No    Drug use: Yes     Comment: pain pills    Sexual activity: Yes     Partners: Female     Review of Systems   Constitutional: Negative for activity change and appetite change.   HENT: Negative for congestion and dental problem.    Eyes: Negative for discharge and itching.   Respiratory: Negative for apnea and chest tightness.    Cardiovascular: Negative for chest pain.   Endocrine: Negative for cold intolerance and heat intolerance.   Genitourinary: Negative for difficulty urinating.   Neurological: Negative for dizziness and facial asymmetry.     Objective:     Weight: 54.4 kg (120 lb)  Body mass index is 17.72 kg/m².  Vital Signs (Most Recent):  Temp: (!) 100.8 °F (38.2 °C) (07/18/19 0110)  Pulse: 97 (07/18/19 0110)  Resp: 16 (07/18/19 0110)  BP: (!) 153/90 (07/18/19 0111)  SpO2: 97 % (07/17/19 2347) Vital Signs (24h Range):  Temp:  [98.3 °F (36.8 °C)-100.8 °F (38.2 °C)] 100.8 °F (38.2 °C)  Pulse:  [] 97  Resp:  [16-22] 16  SpO2:  [92 %-99 %] 97 %  BP: (134-181)/() 153/90                          Neurosurgery Physical Exam  -Alert and oriented x4  -PERRL, EOMI, face symmetrical, tongue midline, facial sensation symmetric, shoulder shrug full strength and symmetric  -Motor: 5/5 throughout the upper and lower extremites bilaterally except for bilateral deltoid pain  limitation due to neck spasms  -sensation intact to light touch throughout  -reflexes 2+ in patella and brachioradialis bilaterally    Significant Labs:  Recent Labs   Lab 07/17/19 2138   *   *   K 3.9   CL 99   CO2 25   BUN 12   CREATININE 0.9   CALCIUM 9.3     Recent Labs   Lab 07/17/19 1812   WBC 32.49*   HGB 11.8*   HCT 36.3*   *     No results for input(s): LABPT, INR, APTT in the last 48 hours.  Microbiology Results (last 7 days)     Procedure Component Value Units Date/Time    Blood Culture #2 **CANNOT BE ORDERED STAT** [[id number]] Collected:  07/17/19 1845    Order Status:  Sent Specimen:  Blood from Peripheral, Antecubital, Right Updated:  07/17/19 2139    Blood Culture #1 **CANNOT BE ORDERED STAT** [[id number]] Collected:  07/17/19 1812    Order Status:  Sent Specimen:  Blood from Peripheral, Antecubital, Left Updated:  07/17/19 1836        All pertinent labs from the last 24 hours have been reviewed.    Significant Diagnostics:  I have reviewed all pertinent imaging results/findings within the past 24 hours.
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
## 2019-07-18 NOTE — ED PROVIDER NOTES
Encounter Date: 7/17/2019       History     Chief Complaint   Patient presents with    Neck Pain     Arrives to ER reports chornic neck and back pain, reports CT being completed yesterday and told he has an abscess on disc in neck. Pt. uncomfortable and restless during traige pain 10/10.     HPI   37 y/o M with a PMHx of IVDU presents to the ED as a transfer from an outside hospital for retropharyngeal abscess. Pt states that he first noticed neck pain approximately 2 weeks ago that have been increasing in intensity. This morning, he was unable to move is head 2/2 pain. Pt states that he would inject heroin but has been clean for 1 year. He denies any voice change or difficulty with handling secretions.     Review of patient's allergies indicates:   Allergen Reactions    Vicodin [hydrocodone-acetaminophen] Hives     Past Medical History:   Diagnosis Date    Anxiety     Hypertension     Neuromuscular disorder      Past Surgical History:   Procedure Laterality Date    cervicle fusion      EPIDURAL BLOCK INJECTION      lumbar x 2     Family History   Problem Relation Age of Onset    No Known Problems Mother     No Known Problems Father     No Known Problems Sister     No Known Problems Sister      Social History     Tobacco Use    Smoking status: Current Every Day Smoker     Packs/day: 1.00    Smokeless tobacco: Former User   Substance Use Topics    Alcohol use: No    Drug use: Yes     Comment: pain pills     Review of Systems   Constitutional: Negative for fever.   HENT: Negative for sore throat, trouble swallowing and voice change.    Respiratory: Negative for shortness of breath.    Cardiovascular: Negative for chest pain.   Gastrointestinal: Negative for nausea.   Genitourinary: Negative for dysuria.   Musculoskeletal: Positive for neck pain and neck stiffness. Negative for back pain.   Skin: Negative for rash.   Neurological: Positive for numbness. Negative for weakness.   Hematological: Does not  bruise/bleed easily.       Physical Exam     Initial Vitals [07/17/19 1654]   BP Pulse Resp Temp SpO2   (!) 134/94 (!) 125 (!) 22 98.3 °F (36.8 °C) 95 %      MAP       --         Physical Exam    Nursing note and vitals reviewed.  Constitutional: He appears well-developed and well-nourished. No distress.   HENT:   Head: Normocephalic and atraumatic.   Mouth/Throat: Oropharynx is clear and moist. No oropharyngeal exudate.   Eyes: Pupils are equal, round, and reactive to light. Right eye exhibits no discharge. Left eye exhibits no discharge.   Neck: Spinous process tenderness and muscular tenderness present. Decreased range of motion present. No tracheal deviation present. Neck rigidity present. No JVD present.   Cardiovascular: Normal rate, regular rhythm and normal heart sounds. Exam reveals no gallop and no friction rub.    No murmur heard.  Pulmonary/Chest: Breath sounds normal. No respiratory distress. He has no wheezes. He has no rhonchi. He has no rales.   Abdominal: Soft. Bowel sounds are normal. He exhibits no distension. There is no tenderness. There is no rebound.   Musculoskeletal: He exhibits no edema or tenderness.   Neurological: He is alert and oriented to person, place, and time. He has normal strength. No sensory deficit. GCS score is 15. GCS eye subscore is 4. GCS verbal subscore is 5. GCS motor subscore is 6.   Skin: Skin is warm. No erythema.         ED Course   Procedures  Labs Reviewed   CBC W/ AUTO DIFFERENTIAL - Abnormal; Notable for the following components:       Result Value    WBC 32.49 (*)     RBC 4.07 (*)     Hemoglobin 11.8 (*)     Hematocrit 36.3 (*)     Platelets 658 (*)     Gran # (ANC) 27.4 (*)     Mono # 2.6 (*)     Gran% 84.9 (*)     Lymph% 7.7 (*)     All other components within normal limits   COMPREHENSIVE METABOLIC PANEL - Abnormal; Notable for the following components:    Sodium 135 (*)     Glucose 119 (*)     Albumin 2.8 (*)     All other components within normal limits     Narrative:     Recoll. [id number] by TRISTAN at 07/17/2019 18:56, reason: SPECIMEN   GROSSLY HEMOLYZED   URINALYSIS, REFLEX TO URINE CULTURE - Abnormal; Notable for the following components:    Protein, UA 2+ (*)     Occult Blood UA 1+ (*)     All other components within normal limits    Narrative:     Preferred Collection Type->Urine, Clean Catch   URINALYSIS MICROSCOPIC - Abnormal; Notable for the following components:    RBC, UA 5 (*)     Hyaline Casts, UA 2 (*)     Granular Casts, UA 1 (*)     All other components within normal limits    Narrative:     Preferred Collection Type->Urine, Clean Catch   CULTURE, BLOOD   CULTURE, BLOOD   LACTIC ACID, PLASMA          Imaging Results          CT Cervical Spine With Contrast (Final result)  Result time 07/17/19 23:28:54    Final result by Tono Ibarra MD (07/17/19 23:28:54)                 Impression:      Rim enhancing retropharyngeal/prevertebral abscess spanning from the C4 through C6 levels, noting the presence of postsurgical ACDF changes at the C5-6 level.  No definite epidural fluid collection or rim enhancing abscess seen.  Acute nondisplaced left mandibular fracture.    Acute nondisplaced left mandibular fracture.    Small 8 mm right lobe thyroid nodule.  Future nonemergent thyroid ultrasound follow-up may be obtained    Findings were discussed with Dr. Bhakta at the time of dictation at 23:15.      Electronically signed by: Tono Ibarra MD  Date:    07/17/2019  Time:    23:28             Narrative:    EXAMINATION:  CT CERVICAL SPINE WITH CONTRAST    CLINICAL HISTORY:  Neck pain, prior surgery (neck), xray negative;abnl CT at outside ER;    TECHNIQUE:  CT of the cervical spine was performed following administration of 70 cc Omnipaque 350 IV contrast.    COMPARISON:  None    FINDINGS:  There is a rim enhancing prevertebral/retropharyngeal abscess which spans 4.5 cm cranial caudally anterior to the C4 through C6 vertebral bodies.  This collection measures  "approximately 4.0 x 2.0 cm in maximal transverse dimensions and extends anteriorly to the posterior margin of the right thyroid lobe.  Postsurgical anterior cervical discectomy and fusion changes are seen in this region at the C5-6 level.  No definite epidural fluid collection or rim enhancing abscess seen on the basis of CT.    Partially visualized acute nondisplaced fracture is seen involving the left mandible which extends through the left mandibular ramus and coronoid process.  No evidence of TMJ dislocation.  There is straightening of the normal cervical lordosis.  No evidence of acute cervical spine fracture or dislocation.  Craniocervical junction is unremarkable.  Odontoid process is intact.    Epiglottis is normal in thickness.  Airway is patent.  Mild paraseptal emphysematous changes are seen within the lung apices.  There is a hypodense 8 mm right thyroid nodule seen.                                 Medical Decision Making:   History:   I obtained history from: EMS provider.  Triage vitals:  Temp 100.8, P 97, /90, SpO2 97% RR 16  Physical exam findings: neck rigidity and posterior and lateral neck tenderness.    Workup: CT from outside facility shows a 4cm abscess anterior to C4-C7 as well as nondisplaced L mandibular fracture. This is concerning for retropharyngeal abscess. Tx: Pt given IV analgesia, IVF, blood cultures and vanc/zosyn at outside facility. I have consulted neuro surgery and ENT for evaluation.   Oliverio Mancuso MD PGY2  "7/18/2019" 2:09AM    Update:   Pt was evaluated by ENT and neuro surgery. No definitive surgical intervention was reached. Pt was admitted to IM for continued IV antibiotics. VSS. Pt updated with no questions.     Oliverio Mancuso MD PGY2  "7/18/2019" 4:33PM                         Clinical Impression:       ICD-10-CM ICD-9-CM   1. Abscess L02.91 682.9   2. Tachycardia R00.0 785.0   3. Fracture of alveolus of left mandible, initial encounter for closed fracture S02.672A " 802.27   4. Retropharyngeal abscess J39.0 478.24                                Oliverio Mancuso MD  Resident  07/18/19 0434

## 2019-07-18 NOTE — ED NOTES
Pt sitting up in bed AOx3, resp even/unlabored, skin w/d, LYNNE well, neck pain 8:10, Dilaudid given. Family at bedside, NAD at this time.

## 2019-07-18 NOTE — CARE UPDATE
Called ED nurse Gretchen for patient avaiability to come to ED. Gretchen states patient is still have very bad pain and is likely not able to to sit still in MRI. Dr. Jaffe notified of the above and ativan ordered. Floor nurse Kathi who is will be receiving patient on 11th floor notified of plan.

## 2019-07-18 NOTE — ED NOTES
"Patient reports pain medication wearing off and is complaining of hunger pains. States "my stomach is eating me and I cant take it anymore" IM3 paged.   "

## 2019-07-18 NOTE — ASSESSMENT & PLAN NOTE
Patient presenting with worsening neck and shoulder pain for 2 weeks. CT scan retropharyngeal/prevertebral abscess spanning from the C4 through C6 levels, noting the presence of postsurgical ACDF changes at the C5-6 level. Known history of IVDU, although states he quit a year ago. Also known history of cellulitis and leg abscess in the past.  Evaluated by neurosurgery and ENT  Received IV vanc and zosyn in the ED    Plan  Per recs:   -Treatment with IV Antibiotics   - IV dexamethasone 8mg q8h x 3 doses   -ENT available for Possible I&D, ACDF revision  -MRI w and w/o contrast of spine  -NPO at midnight   -F/u with blood cultures

## 2019-07-18 NOTE — ED NOTES
"Pt in severe pain and muscles twitching. Pt states "I dont know how much longer I can take this"  "

## 2019-07-18 NOTE — CARE UPDATE
Patient transported via stretcher to MRI. Patient placed on MRI safe monitor and nicotine patch removed. See vs in computer.
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
## 2019-07-18 NOTE — ED TRIAGE NOTES
Pt presents as a transfer from  for eval of abscess to cervical spine, reports neck pain x2 weeks. Endorses intermittent numbness to L hand and difficulty swallowing. Also reporting muscle spasms in his back between his shoulder blades    Patient Identifiers for Marcial Thomas checked and correct  LOC: The patient is awake, alert and aware of environment with an appropriate affect, the patient is oriented x 3 and speaking appropriate.  APPEARANCE: Patient resting comfortably and in no acute distress, patient is clean and well groomed, patient's clothing is properly fastened.  SKIN: The skin is warm and dry, patient has normal skin turgor and moist mucus membranes,no rashes or lesions.Skin Intact , No Breakdown Noted  Musculoskeletal :  Normal range of motion noted. Moves all extremeties well, No swelling or tenderness noted  RESPIRATORY: Airway is open and patent, respirations are spontaneous, patient has a normal effort and rate.  CARDIAC: Patient has a normal rate and rhythm, no periphreal edema noted, capillary refill < 3 seconds.   ABDOMEN: Soft and non tender to palpation, no distention noted.   PULSES: 2+  And symmetrical in all extremeties  NEUROLOGIC: PERRL. facial expression is symmetrical, patient moving all extremities, normal sensation in all extremities when touched with a finger.The patient is awake, alert and cooperative with a calm affect, patient is aware of environment.    Will continue to monitor

## 2019-07-18 NOTE — ED NOTES
Assumed care. Patient c/o pain to neck and back. Admitting team made aware by night shift RN.  Bed in low position, side rails up x 2, call light is within reach of patient, alarms remain on and set for cardiac monitor and pulse ox. Respirations are even and unlabored, patient in position of comfort, explanation of care provided to patient, offers no other complaints at this time, awaiting medication orders and bed assignment, will continue to monitor.
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
## 2019-07-18 NOTE — SUBJECTIVE & OBJECTIVE
Medications:  Continuous Infusions:  Scheduled Meds:   fentaNYL  100 mcg Intravenous ED 1 Time    nicotine  1 patch Transdermal ED 1 Time    vancomycin (VANCOCIN) IVPB  1,000 mg Intravenous ED 1 Time     PRN Meds:     No current facility-administered medications on file prior to encounter.      Current Outpatient Medications on File Prior to Encounter   Medication Sig    gabapentin (NEURONTIN) 800 MG tablet Take 1 tablet (800 mg total) by mouth 3 (three) times daily as needed.    methocarbamol (ROBAXIN) 750 MG Tab Take 1 tablet (750 mg total) by mouth 4 (four) times daily as needed.    methylPREDNISolone (MEDROL DOSEPACK) 4 mg tablet use as directed       Review of patient's allergies indicates:   Allergen Reactions    Vicodin [hydrocodone-acetaminophen] Hives       Past Medical History:   Diagnosis Date    Anxiety     Hypertension     Neuromuscular disorder      Past Surgical History:   Procedure Laterality Date    cervicle fusion      EPIDURAL BLOCK INJECTION      lumbar x 2     Family History     Problem Relation (Age of Onset)    No Known Problems Mother, Father, Sister, Sister        Tobacco Use    Smoking status: Current Every Day Smoker     Packs/day: 1.00    Smokeless tobacco: Former User   Substance and Sexual Activity    Alcohol use: No    Drug use: Yes     Comment: pain pills    Sexual activity: Yes     Partners: Female     Review of Systems   Constitutional: Negative for chills and fever.   HENT: Positive for sore throat and trouble swallowing. Negative for congestion, ear pain, facial swelling and voice change.    Eyes: Negative for visual disturbance.   Respiratory: Negative for shortness of breath and stridor.    Cardiovascular: Negative for chest pain.   Gastrointestinal: Negative for abdominal pain, nausea and vomiting.   Genitourinary: Negative for difficulty urinating.   Musculoskeletal: Positive for neck pain and neck stiffness.   Skin: Negative for wound.   Allergic/Immunologic:  Negative for immunocompromised state.   Neurological: Positive for headaches. Negative for dizziness.   Hematological: Does not bruise/bleed easily.   Psychiatric/Behavioral: Negative for decreased concentration and dysphoric mood. The patient is not nervous/anxious.      Objective:     Vital Signs (Most Recent):  Temp: (!) 100.8 °F (38.2 °C) (07/18/19 0110)  Pulse: 81 (07/18/19 0402)  Resp: 15 (07/18/19 0320)  BP: (!) 173/88 (07/18/19 0402)  SpO2: (!) 93 % (07/18/19 0402) Vital Signs (24h Range):  Temp:  [98.3 °F (36.8 °C)-100.8 °F (38.2 °C)] 100.8 °F (38.2 °C)  Pulse:  [] 81  Resp:  [15-22] 15  SpO2:  [92 %-99 %] 93 %  BP: (134-187)/() 173/88     Weight: 54.4 kg (120 lb)  Body mass index is 17.72 kg/m².        Physical Exam   Constitutional: NAD, appears uncomfortable. Voice clear/deep, not muffled   Eyes: EOM I Bilaterally  Head/Face: Diffuse excoriations/scabs. Normocephalic.  Negative paranasal sinus pressure/tenderness.  Salivary glands WNL.  House Brackmann I Bilaterally.  Right Ear: External Auditory Canal WNL.  Auricle WNL.  Left Ear: External Auditory Canal WNL. Auricle WNL.  Nose: No gross nasal septal deviation. Inferior Turbinates 1+ bilaterally. No septal perforation. No masses/lesions. External nasal skin without masses/lesions.  Oral Cavity: Gingiva/lips WNL.  Dentition fair with good occlusion. FOM Soft, no masses palpated. Oral Tongue mobile. Hard Palate WNL.   Oropharynx: No oropharyngeal edema. Tonsillar fossa/pharyngeal wall without lesions. Posterior oropharynx WNL.  Soft palate without masses. Midline uvula.   Neck/Lymphatic: Poor ROM in all directions. Diffusely tender neck with point tenderness over cervical spine. No LAD I-VI bilaterally.  No thyromegaly.  No masses noted on exam.  Neuro/Psychiatric: AOx3.  Normal mood and affect.   Respiratory: Normal respiratory effort, no stridor/stertor, no retractions noted.    Flexible Fiberoptic Laryngoscopy   Nasopharynx - the torus is  clear. There are no lesions of the posterior wall.   Oropharynx - no lesions of the tongue base. Posterior pharyngeal wall bulging creating airway narrowing although still patent.   Hypopharynx - there are no lesions of the pyriform sinuses or postcricoid region. No edema.   Larynx - there are no lesions of the supraglottic or glottic larynx. No edema. Vocal fold mobility is normal with complete closure.       Significant Labs:  CBC:   Recent Labs   Lab 07/17/19  1812   WBC 32.49*   RBC 4.07*   HGB 11.8*   HCT 36.3*   *   MCV 89   MCH 29.0   MCHC 32.5     CMP:   Recent Labs   Lab 07/17/19  2138   *   CALCIUM 9.3   ALBUMIN 2.8*   PROT 8.2   *   K 3.9   CO2 25   CL 99   BUN 12   CREATININE 0.9   ALKPHOS 88   ALT 11   AST 10   BILITOT 0.3       Significant Diagnostics:  CT: I have reviewed all pertinent results/findings within the past 24 hours and my personal findings are:  prevertebral abscess abutting hardware at C4-6 extending to the right neck, airway patent on sagittal cuts, partially visualized non-displaced left mandibular ramus fracture

## 2019-07-18 NOTE — PLAN OF CARE
Problem: Adult Inpatient Plan of Care  Goal: Plan of Care Review  Outcome: Ongoing (interventions implemented as appropriate)  Pt alert, wife at bedside, no needs voiced, will continue to monitor

## 2019-07-18 NOTE — PROGRESS NOTES
Pharmacokinetic Initial Assessment: IV Vancomycin    Assessment/Plan:    Initiated vancomycin 1 g Q 12 hr  Desired empiric serum trough concentration is 15 to 20 mcg/mL.  Vancomycin trough scheduled for 7/19 @ 11:00   Pharmacy will continue to follow and monitor vancomycin.      Please contact pharmacy at extension [phone] with any questions regarding this assessment.     Thank you for the consult,   Lisa Stanley     Patient brief summary:  Marcial Thomas is a 38 y.o. male initiated on antimicrobial therapy with IV Vancomycin for treatment of bacteremia     Drug Allergies:   Review of patient's allergies indicates:   Allergen Reactions    Vicodin [hydrocodone-acetaminophen] Hives     Actual Body Weight:   54.4    Renal Function:   Estimated Creatinine Clearance: 84 mL/min (based on SCr of 0.9 mg/dL).,     CBC (last 72 hours):  Recent Labs   Lab Result Units 07/17/19  1812   WBC K/uL 32.49*   Hemoglobin g/dL 11.8*   Hematocrit % 36.3*   Platelets K/uL 658*   Gran% % 84.9*   Lymph% % 7.7*   Mono% % 8.0   Eosinophil% % 0.0   Basophil% % 0.1   Differential Method  Automated       Metabolic Panel (last 72 hours):  Recent Labs   Lab Result Units 07/17/19  1943 07/17/19  2138   Sodium mmol/L  --  135*   Potassium mmol/L  --  3.9   Chloride mmol/L  --  99   CO2 mmol/L  --  25   Glucose mg/dL  --  119*   Glucose, UA  Negative  --    BUN, Bld mg/dL  --  12   Creatinine mg/dL  --  0.9   Albumin g/dL  --  2.8*   Total Bilirubin mg/dL  --  0.3   Alkaline Phosphatase U/L  --  88   AST U/L  --  10   ALT U/L  --  11       Drug levels (last 3 results):  No results for input(s): VANCOMYCINRA, VANCOMYCINPE, VANCOMYCINTR in the last 72 hours.    Microbiologic Results:  Microbiology Results (last 7 days)     Procedure Component Value Units Date/Time    Blood Culture #2 **CANNOT BE ORDERED STAT** [[id number]] Collected:  07/17/19 8773    Order Status:  Completed Specimen:  Blood from Peripheral, Antecubital, Right Updated:   07/18/19 0945     Blood Culture, Routine Gram stain yoav bottle: Gram positive cocci in clusters resembling Staph       Positive results previously called 07/18/2019  09:45    Blood Culture #1 **CANNOT BE ORDERED STAT** [[id number]] Collected:  07/17/19 1812    Order Status:  Completed Specimen:  Blood from Peripheral, Antecubital, Left Updated:  07/18/19 0944     Blood Culture, Routine Gram stain aer bottle and  Gram stain yoav bottle: Gram positive cocci in       clusters resembling Staph       Results called to and read back by: Jammie Wilkes RN at [facility] [place] [facility]        07/18/2019  09:43
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
## 2019-07-18 NOTE — SUBJECTIVE & OBJECTIVE
Past Medical History:   Diagnosis Date    Anxiety     Hypertension     Neuromuscular disorder      Past Medical History:   Diagnosis Date    Anxiety     Hypertension     Neuromuscular disorder        Past Surgical History:   Procedure Laterality Date    cervicle fusion      EPIDURAL BLOCK INJECTION      lumbar x 2       Review of patient's allergies indicates:   Allergen Reactions    Vicodin [hydrocodone-acetaminophen] Hives       No current facility-administered medications on file prior to encounter.      Current Outpatient Medications on File Prior to Encounter   Medication Sig    gabapentin (NEURONTIN) 800 MG tablet Take 1 tablet (800 mg total) by mouth 3 (three) times daily as needed.    methocarbamol (ROBAXIN) 750 MG Tab Take 1 tablet (750 mg total) by mouth 4 (four) times daily as needed.    methylPREDNISolone (MEDROL DOSEPACK) 4 mg tablet use as directed     Family History     Problem Relation (Age of Onset)    No Known Problems Mother, Father, Sister, Sister        Tobacco Use    Smoking status: Current Every Day Smoker     Packs/day: 1.00    Smokeless tobacco: Former User   Substance and Sexual Activity    Alcohol use: No    Drug use: Yes     Comment: pain pills    Sexual activity: Yes     Partners: Female     Review of Systems   Constitutional: Positive for activity change and fatigue. Negative for chills and fever.   HENT: Positive for trouble swallowing. Negative for ear discharge and ear pain.    Respiratory: Negative for apnea, cough, choking, shortness of breath and wheezing.    Cardiovascular: Negative for chest pain, palpitations and leg swelling.   Gastrointestinal: Negative for abdominal pain, constipation, diarrhea, nausea and vomiting.   Genitourinary: Negative for decreased urine volume, difficulty urinating and urgency.   Musculoskeletal: Positive for myalgias, neck pain and neck stiffness.   Skin: Positive for wound (Caused by skin picking ). Negative for rash.    Neurological: Negative for dizziness, tremors, seizures, syncope and weakness.   Psychiatric/Behavioral: Negative for confusion and hallucinations. The patient is nervous/anxious.      Objective:     Vital Signs (Most Recent):  Temp: 97.6 °F (36.4 °C) (07/19/19 1440)  Pulse: 103 (07/19/19 1624)  Resp: 20 (07/19/19 1440)  BP: 136/86 (07/19/19 1624)  SpO2: 95 % (07/19/19 1440) Vital Signs (24h Range):  Temp:  [97.3 °F (36.3 °C)-99 °F (37.2 °C)] 97.6 °F (36.4 °C)  Pulse:  [] 103  Resp:  [12-20] 20  SpO2:  [94 %-100 %] 95 %  BP: (127-148)/(79-96) 136/86     Weight: 54.4 kg (120 lb)  Body mass index is 17.72 kg/m².    Physical Exam   Constitutional: He is oriented to person, place, and time. He appears well-developed.   Thin appearing male    HENT:   Head: Normocephalic and atraumatic.   Mouth/Throat: Oropharynx is clear and moist.   Eyes: EOM are normal.   Neck: No thyromegaly present.   Reduced ROM, diffuse tenderness of cervical spine    Cardiovascular: Normal rate, regular rhythm, normal heart sounds and intact distal pulses. Exam reveals no gallop and no friction rub.   No murmur heard.  Pulmonary/Chest: Effort normal and breath sounds normal. No respiratory distress. He has no wheezes. He exhibits no tenderness.   Abdominal: Soft. Bowel sounds are normal. He exhibits no distension and no mass. There is no tenderness.   Musculoskeletal: He exhibits no edema or tenderness.   Lymphadenopathy:     He has no cervical adenopathy.   Neurological: He is alert and oriented to person, place, and time. No cranial nerve deficit.   Skin: Skin is warm and dry.   Excoriations noted on face, hands and legs   Psychiatric: He has a normal mood and affect. His behavior is normal.         CRANIAL NERVES     CN III, IV, VI   Extraocular motions are normal.        Significant Labs:   Blood Culture:   Recent Labs   Lab 07/17/19  1812 07/17/19  1845 07/19/19  0019   LABBLOO Gram stain aer bottle and  Gram stain yoav bottle: Gram  positive cocci in   clusters resembling Staph   Results called to and read back by: Jammie Wilkes RN at [facility] [place] [facility]    07/18/2019  09:43  STAPHYLOCOCCUS AUREUS  Susceptibility pending  * Gram stain yoav bottle: Gram positive cocci in clusters resembling Staph   Positive results previously called 07/18/2019  09:45  Gram stain aer bottle:  Gram positive cocci in clusters resembling Staph  Positive results previously called 07/18/2019  18:27  STAPHYLOCOCCUS AUREUS  Susceptibility pending  * No Growth to date  No Growth to date     BMP:   Recent Labs   Lab 07/19/19  0647   *   *   K 4.6   CL 96   CO2 27   BUN 23*   CREATININE 0.7   CALCIUM 9.5   MG 1.8     CBC:   Recent Labs   Lab 07/17/19  1812 07/19/19  0647   WBC 32.49* 32.16*   HGB 11.8* 11.3*   HCT 36.3* 35.0*   * 599*     CMP:   Recent Labs   Lab 07/17/19  2138 07/19/19  0647   * 133*   K 3.9 4.6   CL 99 96   CO2 25 27   * 156*   BUN 12 23*   CREATININE 0.9 0.7   CALCIUM 9.3 9.5   PROT 8.2 7.7   ALBUMIN 2.8* 2.4*   BILITOT 0.3 0.2   ALKPHOS 88 77   AST 10 10   ALT 11 11   ANIONGAP 11 10   EGFRNONAA >60 >60.0     Magnesium:   Recent Labs   Lab 07/19/19  0647   MG 1.8     TSH: No results for input(s): TSH in the last 4320 hours.  Imaging Results           MRI Cervical Spine W WO Cont (Final result)  Result time 07/18/19 18:27:46    Final result by Dontae Calles MD (07/18/19 18:27:46)                 Impression:      1. Postsurgical changes of C5-6 ACDF with rim enhancing fluid collection within the retropharyngeal/prevertebral soft tissues along the anterior aspect of the cervical hardware concerning for abscess, similar to CT 07/17/2019.  2. Thin focus of abnormal signal at the C6-7 level within the epidural space along the posterior aspect of the disc and anterior aspect of the thecal sac with resulting mild narrowing of the spinal canal.  Finding is favored to represent postoperative hematoma or seroma, with abscess not  excluded.  This report was flagged in Epic as abnormal.    Electronically signed by resident: Rajan Tucker  Date:    07/18/2019  Time:    17:24    Electronically signed by: Dontae Calles MD  Date:    07/18/2019  Time:    18:27             Narrative:    EXAMINATION:  MRI CERVICAL SPINE W WO CONTRAST    CLINICAL HISTORY:  r/o abscess;    TECHNIQUE:  Multiplanar, multisequence MR images of the cervical spine were performed without the administration of contrast.    COMPARISON:  CT 07/17/2019; radiograph 07/12/2019.    FINDINGS:  There are postsurgical changes of C5-6 anterior cervical fusion with interbody device in place at this level.  There is associated metallic artifact limiting evaluation of immediately adjacent structures.  A thin rim of increased T2/STIR signal is present at the C6-7 level which appears isointense to slightly hyperintense on T1 sequences and measures 1.9 x 0.3 x 0.9 cm.  No definite rim enhancement associated with this collection.  This abuts the posterior aspect of the C6-7 disc and the anterior aspect of the thecal sac with resulting mild narrowing of the spinal canal at this level.    Alignment: Normal.    Vertebrae: Normal marrow signal. No fracture.    Discs: Normal height.  Mild multilevel disc desiccation.    Cord: Normal.    Skull base and craniocervical junction: Normal.    Degenerative findings: Mild degenerative changes throughout the cervical spine, most pronounced at C4-5 where there is mild left neural foraminal narrowing.    Paraspinal muscles & soft tissues: There is a rim enhancing heterogeneous signal fluid collection within the retropharyngeal/prevertebral soft tissues which extends from C4-C6 and abuts the anterior aspect of the cervical hardware with extension laterally to the right measuring 3.4 x 4.9 x 1.6 cm, similar to findings on CT 07/17/2019.  Right thyroid nodule again identified measuring 0.7 cm, unchanged.                               CT Cervical Spine With  Contrast (Final result)  Result time 07/17/19 23:28:54    Final result by Tono Ibarra MD (07/17/19 23:28:54)                 Impression:      Rim enhancing retropharyngeal/prevertebral abscess spanning from the C4 through C6 levels, noting the presence of postsurgical ACDF changes at the C5-6 level.  No definite epidural fluid collection or rim enhancing abscess seen.  Acute nondisplaced left mandibular fracture.    Acute nondisplaced left mandibular fracture.    Small 8 mm right lobe thyroid nodule.  Future nonemergent thyroid ultrasound follow-up may be obtained    Findings were discussed with Dr. Bhakta at the time of dictation at 23:15.      Electronically signed by: oTno Ibarra MD  Date:    07/17/2019  Time:    23:28             Narrative:    EXAMINATION:  CT CERVICAL SPINE WITH CONTRAST    CLINICAL HISTORY:  Neck pain, prior surgery (neck), xray negative;abnl CT at outside ER;    TECHNIQUE:  CT of the cervical spine was performed following administration of 70 cc Omnipaque 350 IV contrast.    COMPARISON:  None    FINDINGS:  There is a rim enhancing prevertebral/retropharyngeal abscess which spans 4.5 cm cranial caudally anterior to the C4 through C6 vertebral bodies.  This collection measures approximately 4.0 x 2.0 cm in maximal transverse dimensions and extends anteriorly to the posterior margin of the right thyroid lobe.  Postsurgical anterior cervical discectomy and fusion changes are seen in this region at the C5-6 level.  No definite epidural fluid collection or rim enhancing abscess seen on the basis of CT.    Partially visualized acute nondisplaced fracture is seen involving the left mandible which extends through the left mandibular ramus and coronoid process.  No evidence of TMJ dislocation.  There is straightening of the normal cervical lordosis.  No evidence of acute cervical spine fracture or dislocation.  Craniocervical junction is unremarkable.  Odontoid process is  intact.    Epiglottis is normal in thickness.  Airway is patent.  Mild paraseptal emphysematous changes are seen within the lung apices.  There is a hypodense 8 mm right thyroid nodule seen.                                Past Medical History:   Diagnosis Date    Anxiety     Hypertension     Neuromuscular disorder        Past Surgical History:   Procedure Laterality Date    cervicle fusion      EPIDURAL BLOCK INJECTION      lumbar x 2       Review of patient's allergies indicates:   Allergen Reactions    Vicodin [hydrocodone-acetaminophen] Hives       No current facility-administered medications on file prior to encounter.      Current Outpatient Medications on File Prior to Encounter   Medication Sig    gabapentin (NEURONTIN) 800 MG tablet Take 1 tablet (800 mg total) by mouth 3 (three) times daily as needed.    methocarbamol (ROBAXIN) 750 MG Tab Take 1 tablet (750 mg total) by mouth 4 (four) times daily as needed.    methylPREDNISolone (MEDROL DOSEPACK) 4 mg tablet use as directed     Family History     Problem Relation (Age of Onset)    No Known Problems Mother, Father, Sister, Sister        Tobacco Use    Smoking status: Current Every Day Smoker     Packs/day: 1.00    Smokeless tobacco: Former User   Substance and Sexual Activity    Alcohol use: No    Drug use: Yes     Comment: pain pills    Sexual activity: Yes     Partners: Female     Review of Systems   Constitutional: Positive for activity change and fatigue. Negative for chills and fever.   HENT: Positive for trouble swallowing. Negative for ear discharge and ear pain.    Respiratory: Negative for apnea, cough, choking, shortness of breath and wheezing.    Cardiovascular: Negative for chest pain, palpitations and leg swelling.   Gastrointestinal: Negative for abdominal pain, constipation, diarrhea, nausea and vomiting.   Genitourinary: Negative for decreased urine volume, difficulty urinating and urgency.   Musculoskeletal: Positive for  myalgias, neck pain and neck stiffness.   Skin: Positive for wound (Caused by skin picking ). Negative for rash.   Neurological: Negative for dizziness, tremors, seizures, syncope and weakness.   Psychiatric/Behavioral: Negative for confusion and hallucinations. The patient is nervous/anxious.      Objective:     Vital Signs (Most Recent):  Temp: 97.6 °F (36.4 °C) (07/19/19 1440)  Pulse: 103 (07/19/19 1624)  Resp: 20 (07/19/19 1440)  BP: 136/86 (07/19/19 1624)  SpO2: 95 % (07/19/19 1440) Vital Signs (24h Range):  Temp:  [97.3 °F (36.3 °C)-100 °F (37.8 °C)] 97.6 °F (36.4 °C)  Pulse:  [] 103  Resp:  [12-20] 20  SpO2:  [94 %-100 %] 95 %  BP: (127-182)/(79-96) 136/86     Weight: 54.4 kg (120 lb)  Body mass index is 17.72 kg/m².    Physical Exam   Constitutional: He is oriented to person, place, and time. He appears well-developed.   Thin appearing male    HENT:   Head: Normocephalic and atraumatic.   Mouth/Throat: Oropharynx is clear and moist.   Eyes: EOM are normal.   Neck: No thyromegaly present.   Reduced ROM, diffuse tenderness of cervical spine    Cardiovascular: Normal rate, regular rhythm, normal heart sounds and intact distal pulses. Exam reveals no gallop and no friction rub.   No murmur heard.  Pulmonary/Chest: Effort normal and breath sounds normal. No respiratory distress. He has no wheezes. He exhibits no tenderness.   Abdominal: Soft. Bowel sounds are normal. He exhibits no distension and no mass. There is no tenderness.   Musculoskeletal: He exhibits no edema or tenderness.   Lymphadenopathy:     He has no cervical adenopathy.   Neurological: He is alert and oriented to person, place, and time. No cranial nerve deficit.   Skin: Skin is warm and dry.   Excoriations noted on face, hands and legs   Psychiatric: He has a normal mood and affect. His behavior is normal.         CRANIAL NERVES     CN III, IV, VI   Extraocular motions are normal.        Significant Labs:   Blood Culture:   Recent Labs    Lab 07/17/19  1812 07/17/19  1845 07/19/19  0019   LABBLOO Gram stain aer bottle and  Gram stain yoav bottle: Gram positive cocci in   clusters resembling Staph   Results called to and read back by: Jammie Wilkes RN at [facility] [place] [facility]    07/18/2019  09:43  STAPHYLOCOCCUS AUREUS  Susceptibility pending  * Gram stain yoav bottle: Gram positive cocci in clusters resembling Staph   Positive results previously called 07/18/2019  09:45  Gram stain aer bottle:  Gram positive cocci in clusters resembling Staph  Positive results previously called 07/18/2019  18:27  STAPHYLOCOCCUS AUREUS  Susceptibility pending  * No Growth to date  No Growth to date     BMP:   Recent Labs   Lab 07/19/19  0647   *   *   K 4.6   CL 96   CO2 27   BUN 23*   CREATININE 0.7   CALCIUM 9.5   MG 1.8     CBC:   Recent Labs   Lab 07/17/19  1812 07/19/19  0647   WBC 32.49* 32.16*   HGB 11.8* 11.3*   HCT 36.3* 35.0*   * 599*     CMP:   Recent Labs   Lab 07/17/19  2138 07/19/19  0647   * 133*   K 3.9 4.6   CL 99 96   CO2 25 27   * 156*   BUN 12 23*   CREATININE 0.9 0.7   CALCIUM 9.3 9.5   PROT 8.2 7.7   ALBUMIN 2.8* 2.4*   BILITOT 0.3 0.2   ALKPHOS 88 77   AST 10 10   ALT 11 11   ANIONGAP 11 10   EGFRNONAA >60 >60.0     Magnesium:   Recent Labs   Lab 07/19/19  0647   MG 1.8     TSH: No results for input(s): TSH in the last 4320 hours.  Imaging Results           MRI Cervical Spine W WO Cont (Final result)  Result time 07/18/19 18:27:46    Final result by Dontae Calles MD (07/18/19 18:27:46)                 Impression:      1. Postsurgical changes of C5-6 ACDF with rim enhancing fluid collection within the retropharyngeal/prevertebral soft tissues along the anterior aspect of the cervical hardware concerning for abscess, similar to CT 07/17/2019.  2. Thin focus of abnormal signal at the C6-7 level within the epidural space along the posterior aspect of the disc and anterior aspect of the thecal sac with resulting mild  narrowing of the spinal canal.  Finding is favored to represent postoperative hematoma or seroma, with abscess not excluded.  This report was flagged in Epic as abnormal.    Electronically signed by resident: Rajan Tucker  Date:    07/18/2019  Time:    17:24    Electronically signed by: Dontae Calles MD  Date:    07/18/2019  Time:    18:27             Narrative:    EXAMINATION:  MRI CERVICAL SPINE W WO CONTRAST    CLINICAL HISTORY:  r/o abscess;    TECHNIQUE:  Multiplanar, multisequence MR images of the cervical spine were performed without the administration of contrast.    COMPARISON:  CT 07/17/2019; radiograph 07/12/2019.    FINDINGS:  There are postsurgical changes of C5-6 anterior cervical fusion with interbody device in place at this level.  There is associated metallic artifact limiting evaluation of immediately adjacent structures.  A thin rim of increased T2/STIR signal is present at the C6-7 level which appears isointense to slightly hyperintense on T1 sequences and measures 1.9 x 0.3 x 0.9 cm.  No definite rim enhancement associated with this collection.  This abuts the posterior aspect of the C6-7 disc and the anterior aspect of the thecal sac with resulting mild narrowing of the spinal canal at this level.    Alignment: Normal.    Vertebrae: Normal marrow signal. No fracture.    Discs: Normal height.  Mild multilevel disc desiccation.    Cord: Normal.    Skull base and craniocervical junction: Normal.    Degenerative findings: Mild degenerative changes throughout the cervical spine, most pronounced at C4-5 where there is mild left neural foraminal narrowing.    Paraspinal muscles & soft tissues: There is a rim enhancing heterogeneous signal fluid collection within the retropharyngeal/prevertebral soft tissues which extends from C4-C6 and abuts the anterior aspect of the cervical hardware with extension laterally to the right measuring 3.4 x 4.9 x 1.6 cm, similar to findings on CT 07/17/2019.  Right  thyroid nodule again identified measuring 0.7 cm, unchanged.                               CT Cervical Spine With Contrast (Final result)  Result time 07/17/19 23:28:54    Final result by Tono Ibarra MD (07/17/19 23:28:54)                 Impression:      Rim enhancing retropharyngeal/prevertebral abscess spanning from the C4 through C6 levels, noting the presence of postsurgical ACDF changes at the C5-6 level.  No definite epidural fluid collection or rim enhancing abscess seen.  Acute nondisplaced left mandibular fracture.    Acute nondisplaced left mandibular fracture.    Small 8 mm right lobe thyroid nodule.  Future nonemergent thyroid ultrasound follow-up may be obtained    Findings were discussed with Dr. Bhakta at the time of dictation at 23:15.      Electronically signed by: Tono Ibarra MD  Date:    07/17/2019  Time:    23:28             Narrative:    EXAMINATION:  CT CERVICAL SPINE WITH CONTRAST    CLINICAL HISTORY:  Neck pain, prior surgery (neck), xray negative;abnl CT at outside ER;    TECHNIQUE:  CT of the cervical spine was performed following administration of 70 cc Omnipaque 350 IV contrast.    COMPARISON:  None    FINDINGS:  There is a rim enhancing prevertebral/retropharyngeal abscess which spans 4.5 cm cranial caudally anterior to the C4 through C6 vertebral bodies.  This collection measures approximately 4.0 x 2.0 cm in maximal transverse dimensions and extends anteriorly to the posterior margin of the right thyroid lobe.  Postsurgical anterior cervical discectomy and fusion changes are seen in this region at the C5-6 level.  No definite epidural fluid collection or rim enhancing abscess seen on the basis of CT.    Partially visualized acute nondisplaced fracture is seen involving the left mandible which extends through the left mandibular ramus and coronoid process.  No evidence of TMJ dislocation.  There is straightening of the normal cervical lordosis.  No evidence of acute  cervical spine fracture or dislocation.  Craniocervical junction is unremarkable.  Odontoid process is intact.    Epiglottis is normal in thickness.  Airway is patent.  Mild paraseptal emphysematous changes are seen within the lung apices.  There is a hypodense 8 mm right thyroid nodule seen.                                  Past Surgical History:   Procedure Laterality Date    cervicle fusion      EPIDURAL BLOCK INJECTION      lumbar x 2       Review of patient's allergies indicates:   Allergen Reactions    Vicodin [hydrocodone-acetaminophen] Hives       No current facility-administered medications on file prior to encounter.      Current Outpatient Medications on File Prior to Encounter   Medication Sig    gabapentin (NEURONTIN) 800 MG tablet Take 1 tablet (800 mg total) by mouth 3 (three) times daily as needed.    methocarbamol (ROBAXIN) 750 MG Tab Take 1 tablet (750 mg total) by mouth 4 (four) times daily as needed.    methylPREDNISolone (MEDROL DOSEPACK) 4 mg tablet use as directed     Family History     Problem Relation (Age of Onset)    No Known Problems Mother, Father, Sister, Sister        Tobacco Use    Smoking status: Current Every Day Smoker     Packs/day: 1.00    Smokeless tobacco: Former User   Substance and Sexual Activity    Alcohol use: No    Drug use: Yes     Comment: pain pills    Sexual activity: Yes     Partners: Female     Review of Systems   Eyes: Positive for pain.     Objective:     Vital Signs (Most Recent):  Temp: (!) 100.8 °F (38.2 °C) (07/18/19 0110)  Pulse: 87 (07/18/19 0731)  Resp: (!) 24 (07/18/19 0731)  BP: (!) 147/94 (07/18/19 0731)  SpO2: 99 % (07/18/19 0731) Vital Signs (24h Range):  Temp:  [98.3 °F (36.8 °C)-100.8 °F (38.2 °C)] 100.8 °F (38.2 °C)  Pulse:  [] 87  Resp:  [12-24] 24  SpO2:  [92 %-99 %] 99 %  BP: (119-187)/() 147/94     Weight: 54.4 kg (120 lb)  Body mass index is 17.72 kg/m².    Physical Exam        Significant Labs: All pertinent labs within  the past 24 hours have been reviewed.    Significant Imaging: I have reviewed and interpreted all pertinent imaging results/findings within the past 24 hours.
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
## 2019-07-18 NOTE — SUBJECTIVE & OBJECTIVE
Past Medical History:   Diagnosis Date    Anxiety     Hypertension     Neuromuscular disorder        Past Surgical History:   Procedure Laterality Date    cervicle fusion      EPIDURAL BLOCK INJECTION      lumbar x 2       Review of patient's allergies indicates:   Allergen Reactions    Vicodin [hydrocodone-acetaminophen] Hives       No current facility-administered medications on file prior to encounter.      Current Outpatient Medications on File Prior to Encounter   Medication Sig    gabapentin (NEURONTIN) 800 MG tablet Take 1 tablet (800 mg total) by mouth 3 (three) times daily as needed.    methocarbamol (ROBAXIN) 750 MG Tab Take 1 tablet (750 mg total) by mouth 4 (four) times daily as needed.    methylPREDNISolone (MEDROL DOSEPACK) 4 mg tablet use as directed     Family History     Problem Relation (Age of Onset)    No Known Problems Mother, Father, Sister, Sister        Tobacco Use    Smoking status: Current Every Day Smoker     Packs/day: 1.00    Smokeless tobacco: Former User   Substance and Sexual Activity    Alcohol use: No    Drug use: Yes     Comment: pain pills    Sexual activity: Yes     Partners: Female     Review of Systems   Constitutional: Positive for activity change and fatigue. Negative for chills and fever.   HENT: Positive for trouble swallowing. Negative for ear discharge and ear pain.    Respiratory: Negative for apnea, cough, choking, shortness of breath and wheezing.    Cardiovascular: Negative for chest pain, palpitations and leg swelling.   Gastrointestinal: Negative for abdominal pain, constipation, diarrhea, nausea and vomiting.   Genitourinary: Negative for decreased urine volume, difficulty urinating and urgency.   Musculoskeletal: Positive for myalgias, neck pain and neck stiffness.   Skin: Positive for wound (Caused by skin picking ). Negative for rash.   Neurological: Negative for dizziness, tremors, seizures, syncope and weakness.   Psychiatric/Behavioral: Negative  for confusion and hallucinations. The patient is nervous/anxious.      Objective:     Vital Signs (Most Recent):  Temp: (!) 100.8 °F (38.2 °C) (07/18/19 0110)  Pulse: 87 (07/18/19 0731)  Resp: (!) 24 (07/18/19 0731)  BP: (!) 147/94 (07/18/19 0731)  SpO2: 99 % (07/18/19 0731) Vital Signs (24h Range):  Temp:  [98.3 °F (36.8 °C)-100.8 °F (38.2 °C)] 100.8 °F (38.2 °C)  Pulse:  [] 87  Resp:  [12-24] 24  SpO2:  [92 %-99 %] 99 %  BP: (119-187)/() 147/94     Weight: 54.4 kg (120 lb)  Body mass index is 17.72 kg/m².    Physical Exam   Constitutional: He is oriented to person, place, and time. He appears well-developed.   Thin appearing male    HENT:   Head: Normocephalic and atraumatic.   Mouth/Throat: Oropharynx is clear and moist.   Eyes: EOM are normal.   Neck: No thyromegaly present.   Reduced ROM, diffuse tenderness of cervical spine    Cardiovascular: Normal rate, regular rhythm, normal heart sounds and intact distal pulses. Exam reveals no gallop and no friction rub.   No murmur heard.  Pulmonary/Chest: Effort normal and breath sounds normal. No respiratory distress. He has no wheezes. He exhibits no tenderness.   Abdominal: Soft. Bowel sounds are normal. He exhibits no distension and no mass. There is no tenderness.   Musculoskeletal: He exhibits no edema or tenderness.   Lymphadenopathy:     He has no cervical adenopathy.   Neurological: He is alert and oriented to person, place, and time. No cranial nerve deficit.   Skin: Skin is warm and dry.   Excoriations noted on face, hands and legs   Psychiatric: He has a normal mood and affect. His behavior is normal.         CRANIAL NERVES     CN III, IV, VI   Extraocular motions are normal.        Significant Labs:   Blood Culture:   Recent Labs   Lab 07/17/19  1812 07/17/19  1845   LABBLOO No Growth to date No Growth to date     BMP:   Recent Labs   Lab 07/17/19 2138   *   *   K 3.9   CL 99   CO2 25   BUN 12   CREATININE 0.9   CALCIUM 9.3      CBC:   Recent Labs   Lab 07/17/19  1812   WBC 32.49*   HGB 11.8*   HCT 36.3*   *     CMP:   Recent Labs   Lab 07/17/19  2138   *   K 3.9   CL 99   CO2 25   *   BUN 12   CREATININE 0.9   CALCIUM 9.3   PROT 8.2   ALBUMIN 2.8*   BILITOT 0.3   ALKPHOS 88   AST 10   ALT 11   ANIONGAP 11   EGFRNONAA >60     Magnesium: No results for input(s): MG in the last 48 hours.  TSH: No results for input(s): TSH in the last 4320 hours.  Imaging Results          CT Cervical Spine With Contrast (Final result)  Result time 07/17/19 23:28:54    Final result by Tono Ibarra MD (07/17/19 23:28:54)                 Impression:      Rim enhancing retropharyngeal/prevertebral abscess spanning from the C4 through C6 levels, noting the presence of postsurgical ACDF changes at the C5-6 level.  No definite epidural fluid collection or rim enhancing abscess seen.  Acute nondisplaced left mandibular fracture.    Acute nondisplaced left mandibular fracture.    Small 8 mm right lobe thyroid nodule.  Future nonemergent thyroid ultrasound follow-up may be obtained    Findings were discussed with Dr. Bhakta at the time of dictation at 23:15.      Electronically signed by: Tono Ibarra MD  Date:    07/17/2019  Time:    23:28             Narrative:    EXAMINATION:  CT CERVICAL SPINE WITH CONTRAST    CLINICAL HISTORY:  Neck pain, prior surgery (neck), xray negative;abnl CT at outside ER;    TECHNIQUE:  CT of the cervical spine was performed following administration of 70 cc Omnipaque 350 IV contrast.    COMPARISON:  None    FINDINGS:  There is a rim enhancing prevertebral/retropharyngeal abscess which spans 4.5 cm cranial caudally anterior to the C4 through C6 vertebral bodies.  This collection measures approximately 4.0 x 2.0 cm in maximal transverse dimensions and extends anteriorly to the posterior margin of the right thyroid lobe.  Postsurgical anterior cervical discectomy and fusion changes are seen in this region at  the C5-6 level.  No definite epidural fluid collection or rim enhancing abscess seen on the basis of CT.    Partially visualized acute nondisplaced fracture is seen involving the left mandible which extends through the left mandibular ramus and coronoid process.  No evidence of TMJ dislocation.  There is straightening of the normal cervical lordosis.  No evidence of acute cervical spine fracture or dislocation.  Craniocervical junction is unremarkable.  Odontoid process is intact.    Epiglottis is normal in thickness.  Airway is patent.  Mild paraseptal emphysematous changes are seen within the lung apices.  There is a hypodense 8 mm right thyroid nodule seen.

## 2019-07-19 ENCOUNTER — ANESTHESIA (OUTPATIENT)
Dept: SURGERY | Facility: HOSPITAL | Age: 39
DRG: 907 | End: 2019-07-19
Payer: MEDICARE

## 2019-07-19 ENCOUNTER — ANESTHESIA EVENT (OUTPATIENT)
Dept: SURGERY | Facility: HOSPITAL | Age: 39
DRG: 907 | End: 2019-07-19
Payer: MEDICARE

## 2019-07-19 LAB
ALBUMIN SERPL BCP-MCNC: 2.4 G/DL (ref 3.5–5.2)
ALP SERPL-CCNC: 77 U/L (ref 55–135)
ALT SERPL W/O P-5'-P-CCNC: 11 U/L (ref 10–44)
ANION GAP SERPL CALC-SCNC: 10 MMOL/L (ref 8–16)
ASCENDING AORTA: 2.82 CM
AST SERPL-CCNC: 10 U/L (ref 10–40)
BASOPHILS # BLD AUTO: 0.04 K/UL (ref 0–0.2)
BASOPHILS NFR BLD: 0.1 % (ref 0–1.9)
BILIRUB SERPL-MCNC: 0.2 MG/DL (ref 0.1–1)
BSA FOR ECHO PROCEDURE: 1.63 M2
BUN SERPL-MCNC: 23 MG/DL (ref 6–20)
CALCIUM SERPL-MCNC: 9.5 MG/DL (ref 8.7–10.5)
CHLORIDE SERPL-SCNC: 96 MMOL/L (ref 95–110)
CO2 SERPL-SCNC: 27 MMOL/L (ref 23–29)
CREAT SERPL-MCNC: 0.7 MG/DL (ref 0.5–1.4)
CRP SERPL-MCNC: 189 MG/L (ref 0–8.2)
CV ECHO LV RWT: 0.29 CM
DIFFERENTIAL METHOD: ABNORMAL
DOP CALC LVOT AREA: 3.4 CM2
DOP CALC LVOT DIAMETER: 2.07 CM
DOP CALC LVOT PEAK VEL: 1.07 M/S
DOP CALC LVOT STROKE VOLUME: 66.4 CM3
DOP CALCLVOT PEAK VEL VTI: 19.74 CM
ECHO LV POSTERIOR WALL: 0.78 CM (ref 0.6–1.1)
EOSINOPHIL # BLD AUTO: 0 K/UL (ref 0–0.5)
EOSINOPHIL NFR BLD: 0 % (ref 0–8)
ERYTHROCYTE [DISTWIDTH] IN BLOOD BY AUTOMATED COUNT: 13.8 % (ref 11.5–14.5)
ERYTHROCYTE [SEDIMENTATION RATE] IN BLOOD BY WESTERGREN METHOD: >120 MM/HR (ref 0–23)
EST. GFR  (AFRICAN AMERICAN): >60 ML/MIN/1.73 M^2
EST. GFR  (NON AFRICAN AMERICAN): >60 ML/MIN/1.73 M^2
FRACTIONAL SHORTENING: 43 % (ref 28–44)
GLUCOSE SERPL-MCNC: 156 MG/DL (ref 70–110)
GRAM STN SPEC: NORMAL
GRAM STN SPEC: NORMAL
HBV CORE AB SERPL QL IA: POSITIVE
HBV SURFACE AG SERPL QL IA: NEGATIVE
HCT VFR BLD AUTO: 35 % (ref 40–54)
HCV AB SERPL QL IA: POSITIVE
HGB BLD-MCNC: 11.3 G/DL (ref 14–18)
HIV 1+2 AB+HIV1 P24 AG SERPL QL IA: NEGATIVE
IMM GRANULOCYTES # BLD AUTO: 0.31 K/UL (ref 0–0.04)
IMM GRANULOCYTES NFR BLD AUTO: 1 % (ref 0–0.5)
INTERVENTRICULAR SEPTUM: 0.71 CM (ref 0.6–1.1)
LA MAJOR: 4.1 CM
LA MINOR: 4.98 CM
LA WIDTH: 3.35 CM
LEFT ATRIUM SIZE: 3.34 CM
LEFT ATRIUM VOLUME INDEX: 25.7 ML/M2
LEFT ATRIUM VOLUME: 42.77 CM3
LEFT INTERNAL DIMENSION IN SYSTOLE: 3.01 CM (ref 2.1–4)
LEFT VENTRICLE DIASTOLIC VOLUME INDEX: 81.09 ML/M2
LEFT VENTRICLE DIASTOLIC VOLUME: 134.81 ML
LEFT VENTRICLE MASS INDEX: 82 G/M2
LEFT VENTRICLE SYSTOLIC VOLUME INDEX: 21.2 ML/M2
LEFT VENTRICLE SYSTOLIC VOLUME: 35.24 ML
LEFT VENTRICULAR INTERNAL DIMENSION IN DIASTOLE: 5.29 CM (ref 3.5–6)
LEFT VENTRICULAR MASS: 136.74 G
LYMPHOCYTES # BLD AUTO: 1.6 K/UL (ref 1–4.8)
LYMPHOCYTES NFR BLD: 4.9 % (ref 18–48)
MAGNESIUM SERPL-MCNC: 1.8 MG/DL (ref 1.6–2.6)
MCH RBC QN AUTO: 28.1 PG (ref 27–31)
MCHC RBC AUTO-ENTMCNC: 32.3 G/DL (ref 32–36)
MCV RBC AUTO: 87 FL (ref 82–98)
MONOCYTES # BLD AUTO: 0.7 K/UL (ref 0.3–1)
MONOCYTES NFR BLD: 2 % (ref 4–15)
NEUTROPHILS # BLD AUTO: 29.6 K/UL (ref 1.8–7.7)
NEUTROPHILS NFR BLD: 92 % (ref 38–73)
NRBC BLD-RTO: 0 /100 WBC
PHOSPHATE SERPL-MCNC: 3.2 MG/DL (ref 2.7–4.5)
PISA TR MAX VEL: 2.2 M/S
PLATELET # BLD AUTO: 599 K/UL (ref 150–350)
PLATELET BLD QL SMEAR: ABNORMAL
PMV BLD AUTO: 9.4 FL (ref 9.2–12.9)
POTASSIUM SERPL-SCNC: 4.6 MMOL/L (ref 3.5–5.1)
PROT SERPL-MCNC: 7.7 G/DL (ref 6–8.4)
PULM VEIN S/D RATIO: 0.92
PV PEAK D VEL: 0.74 M/S
PV PEAK S VEL: 0.68 M/S
RA MAJOR: 4.15 CM
RA PRESSURE: 3 MMHG
RA WIDTH: 3.1 CM
RBC # BLD AUTO: 4.02 M/UL (ref 4.6–6.2)
RIGHT VENTRICULAR END-DIASTOLIC DIMENSION: 3.01 CM
RV TISSUE DOPPLER FREE WALL SYSTOLIC VELOCITY 1 (APICAL 4 CHAMBER VIEW): 11.58 CM/S
SINUS: 2.85 CM
SODIUM SERPL-SCNC: 133 MMOL/L (ref 136–145)
STJ: 2.49 CM
TDI LATERAL: 0.11 M/S
TDI SEPTAL: 0.1 M/S
TDI: 0.11 M/S
TOXIC GRANULES BLD QL SMEAR: PRESENT
TR MAX PG: 19 MMHG
TRICUSPID ANNULAR PLANE SYSTOLIC EXCURSION: 2.24 CM
TV REST PULMONARY ARTERY PRESSURE: 22 MMHG
VANCOMYCIN TROUGH SERPL-MCNC: 4.8 UG/ML (ref 10–22)
WBC # BLD AUTO: 32.16 K/UL (ref 3.9–12.7)

## 2019-07-19 PROCEDURE — 63600175 PHARM REV CODE 636 W HCPCS

## 2019-07-19 PROCEDURE — 80202 ASSAY OF VANCOMYCIN: CPT

## 2019-07-19 PROCEDURE — 87015 SPECIMEN INFECT AGNT CONCNTJ: CPT

## 2019-07-19 PROCEDURE — 99232 PR SUBSEQUENT HOSPITAL CARE,LEVL II: ICD-10-PCS | Mod: GC,,, | Performed by: INTERNAL MEDICINE

## 2019-07-19 PROCEDURE — 87186 SC STD MICRODIL/AGAR DIL: CPT

## 2019-07-19 PROCEDURE — 71000033 HC RECOVERY, INTIAL HOUR: Performed by: OTOLARYNGOLOGY

## 2019-07-19 PROCEDURE — D9220A PRA ANESTHESIA: Mod: ,,, | Performed by: ANESTHESIOLOGY

## 2019-07-19 PROCEDURE — 85652 RBC SED RATE AUTOMATED: CPT

## 2019-07-19 PROCEDURE — 27000221 HC OXYGEN, UP TO 24 HOURS

## 2019-07-19 PROCEDURE — 63600175 PHARM REV CODE 636 W HCPCS: Performed by: UROLOGY

## 2019-07-19 PROCEDURE — 21501 PR I&D DEEP ABSC/HEMATOMA NECK/CHEST: ICD-10-PCS | Mod: ,,, | Performed by: OTOLARYNGOLOGY

## 2019-07-19 PROCEDURE — 86703 HIV-1/HIV-2 1 RESULT ANTBDY: CPT

## 2019-07-19 PROCEDURE — 87040 BLOOD CULTURE FOR BACTERIA: CPT | Mod: 59

## 2019-07-19 PROCEDURE — 87340 HEPATITIS B SURFACE AG IA: CPT

## 2019-07-19 PROCEDURE — 84100 ASSAY OF PHOSPHORUS: CPT

## 2019-07-19 PROCEDURE — 36415 COLL VENOUS BLD VENIPUNCTURE: CPT

## 2019-07-19 PROCEDURE — 25000003 PHARM REV CODE 250: Performed by: UROLOGY

## 2019-07-19 PROCEDURE — 87205 SMEAR GRAM STAIN: CPT

## 2019-07-19 PROCEDURE — 87077 CULTURE AEROBIC IDENTIFY: CPT

## 2019-07-19 PROCEDURE — 25000003 PHARM REV CODE 250: Performed by: OTOLARYNGOLOGY

## 2019-07-19 PROCEDURE — 99231 PR SUBSEQUENT HOSPITAL CARE,LEVL I: ICD-10-PCS | Mod: 25,,, | Performed by: OTOLARYNGOLOGY

## 2019-07-19 PROCEDURE — 11000001 HC ACUTE MED/SURG PRIVATE ROOM

## 2019-07-19 PROCEDURE — 71000039 HC RECOVERY, EACH ADD'L HOUR: Performed by: OTOLARYNGOLOGY

## 2019-07-19 PROCEDURE — 83735 ASSAY OF MAGNESIUM: CPT

## 2019-07-19 PROCEDURE — 86704 HEP B CORE ANTIBODY TOTAL: CPT

## 2019-07-19 PROCEDURE — 86140 C-REACTIVE PROTEIN: CPT

## 2019-07-19 PROCEDURE — C1729 CATH, DRAINAGE: HCPCS | Performed by: OTOLARYNGOLOGY

## 2019-07-19 PROCEDURE — 37000008 HC ANESTHESIA 1ST 15 MINUTES: Performed by: OTOLARYNGOLOGY

## 2019-07-19 PROCEDURE — 87116 MYCOBACTERIA CULTURE: CPT

## 2019-07-19 PROCEDURE — 37000009 HC ANESTHESIA EA ADD 15 MINS: Performed by: OTOLARYNGOLOGY

## 2019-07-19 PROCEDURE — 99232 SBSQ HOSP IP/OBS MODERATE 35: CPT | Mod: GC,,, | Performed by: INTERNAL MEDICINE

## 2019-07-19 PROCEDURE — 87075 CULTR BACTERIA EXCEPT BLOOD: CPT

## 2019-07-19 PROCEDURE — 86803 HEPATITIS C AB TEST: CPT

## 2019-07-19 PROCEDURE — 87070 CULTURE OTHR SPECIMN AEROBIC: CPT

## 2019-07-19 PROCEDURE — 87206 SMEAR FLUORESCENT/ACID STAI: CPT

## 2019-07-19 PROCEDURE — 94761 N-INVAS EAR/PLS OXIMETRY MLT: CPT

## 2019-07-19 PROCEDURE — 63600175 PHARM REV CODE 636 W HCPCS: Performed by: INTERNAL MEDICINE

## 2019-07-19 PROCEDURE — 25000003 PHARM REV CODE 250: Performed by: PHYSICIAN ASSISTANT

## 2019-07-19 PROCEDURE — 36000705 HC OR TIME LEV I EA ADD 15 MIN: Performed by: OTOLARYNGOLOGY

## 2019-07-19 PROCEDURE — 25000003 PHARM REV CODE 250: Performed by: INTERNAL MEDICINE

## 2019-07-19 PROCEDURE — 87102 FUNGUS ISOLATION CULTURE: CPT

## 2019-07-19 PROCEDURE — 25000003 PHARM REV CODE 250: Performed by: STUDENT IN AN ORGANIZED HEALTH CARE EDUCATION/TRAINING PROGRAM

## 2019-07-19 PROCEDURE — D9220A PRA ANESTHESIA: ICD-10-PCS | Mod: ,,, | Performed by: ANESTHESIOLOGY

## 2019-07-19 PROCEDURE — 80053 COMPREHEN METABOLIC PANEL: CPT

## 2019-07-19 PROCEDURE — 21501 I&D DP ABSC/HMTMA SFT TS NCK: CPT | Mod: ,,, | Performed by: OTOLARYNGOLOGY

## 2019-07-19 PROCEDURE — 36000704 HC OR TIME LEV I 1ST 15 MIN: Performed by: OTOLARYNGOLOGY

## 2019-07-19 PROCEDURE — 85025 COMPLETE CBC W/AUTO DIFF WBC: CPT

## 2019-07-19 PROCEDURE — 63600175 PHARM REV CODE 636 W HCPCS: Performed by: PHYSICIAN ASSISTANT

## 2019-07-19 PROCEDURE — 99231 SBSQ HOSP IP/OBS SF/LOW 25: CPT | Mod: 25,,, | Performed by: OTOLARYNGOLOGY

## 2019-07-19 RX ORDER — PHENYLEPHRINE HYDROCHLORIDE 10 MG/ML
INJECTION INTRAVENOUS
Status: DISCONTINUED | OUTPATIENT
Start: 2019-07-19 | End: 2019-07-19

## 2019-07-19 RX ORDER — KETAMINE HYDROCHLORIDE 10 MG/ML
INJECTION, SOLUTION INTRAMUSCULAR; INTRAVENOUS
Status: DISCONTINUED | OUTPATIENT
Start: 2019-07-19 | End: 2019-07-19

## 2019-07-19 RX ORDER — LIDOCAINE HYDROCHLORIDE AND EPINEPHRINE 10; 10 MG/ML; UG/ML
INJECTION, SOLUTION INFILTRATION; PERINEURAL
Status: DISCONTINUED | OUTPATIENT
Start: 2019-07-19 | End: 2019-07-19 | Stop reason: HOSPADM

## 2019-07-19 RX ORDER — DEXAMETHASONE SODIUM PHOSPHATE 4 MG/ML
INJECTION, SOLUTION INTRA-ARTICULAR; INTRALESIONAL; INTRAMUSCULAR; INTRAVENOUS; SOFT TISSUE
Status: DISCONTINUED | OUTPATIENT
Start: 2019-07-19 | End: 2019-07-19

## 2019-07-19 RX ORDER — MIDAZOLAM HYDROCHLORIDE 1 MG/ML
INJECTION, SOLUTION INTRAMUSCULAR; INTRAVENOUS
Status: DISCONTINUED | OUTPATIENT
Start: 2019-07-19 | End: 2019-07-19

## 2019-07-19 RX ORDER — KETOROLAC TROMETHAMINE 30 MG/ML
INJECTION, SOLUTION INTRAMUSCULAR; INTRAVENOUS
Status: DISCONTINUED | OUTPATIENT
Start: 2019-07-19 | End: 2019-07-19

## 2019-07-19 RX ORDER — ONDANSETRON 2 MG/ML
INJECTION INTRAMUSCULAR; INTRAVENOUS
Status: DISCONTINUED | OUTPATIENT
Start: 2019-07-19 | End: 2019-07-19

## 2019-07-19 RX ORDER — FENTANYL CITRATE 50 UG/ML
INJECTION, SOLUTION INTRAMUSCULAR; INTRAVENOUS
Status: COMPLETED
Start: 2019-07-19 | End: 2019-07-19

## 2019-07-19 RX ORDER — PROPOFOL 10 MG/ML
VIAL (ML) INTRAVENOUS
Status: DISCONTINUED | OUTPATIENT
Start: 2019-07-19 | End: 2019-07-19

## 2019-07-19 RX ORDER — ROCURONIUM BROMIDE 10 MG/ML
INJECTION, SOLUTION INTRAVENOUS
Status: DISCONTINUED | OUTPATIENT
Start: 2019-07-19 | End: 2019-07-19

## 2019-07-19 RX ORDER — FENTANYL CITRATE 50 UG/ML
INJECTION, SOLUTION INTRAMUSCULAR; INTRAVENOUS
Status: DISCONTINUED | OUTPATIENT
Start: 2019-07-19 | End: 2019-07-19

## 2019-07-19 RX ORDER — ACETAMINOPHEN 10 MG/ML
INJECTION, SOLUTION INTRAVENOUS
Status: DISCONTINUED | OUTPATIENT
Start: 2019-07-19 | End: 2019-07-19

## 2019-07-19 RX ORDER — FENTANYL CITRATE 50 UG/ML
25 INJECTION, SOLUTION INTRAMUSCULAR; INTRAVENOUS EVERY 5 MIN PRN
Status: COMPLETED | OUTPATIENT
Start: 2019-07-19 | End: 2019-07-19

## 2019-07-19 RX ORDER — RAMELTEON 8 MG/1
8 TABLET ORAL NIGHTLY PRN
Status: DISCONTINUED | OUTPATIENT
Start: 2019-07-19 | End: 2019-07-20 | Stop reason: HOSPADM

## 2019-07-19 RX ORDER — LIDOCAINE HCL/PF 100 MG/5ML
SYRINGE (ML) INTRAVENOUS
Status: DISCONTINUED | OUTPATIENT
Start: 2019-07-19 | End: 2019-07-19

## 2019-07-19 RX ORDER — VANCOMYCIN HCL IN 5 % DEXTROSE 1G/250ML
20 PLASTIC BAG, INJECTION (ML) INTRAVENOUS
Status: DISCONTINUED | OUTPATIENT
Start: 2019-07-19 | End: 2019-07-19

## 2019-07-19 RX ORDER — SODIUM CHLORIDE 0.9 % (FLUSH) 0.9 %
10 SYRINGE (ML) INJECTION
Status: DISCONTINUED | OUTPATIENT
Start: 2019-07-19 | End: 2019-07-20 | Stop reason: HOSPADM

## 2019-07-19 RX ORDER — SUCCINYLCHOLINE CHLORIDE 20 MG/ML
INJECTION INTRAMUSCULAR; INTRAVENOUS
Status: DISCONTINUED | OUTPATIENT
Start: 2019-07-19 | End: 2019-07-19

## 2019-07-19 RX ORDER — BACITRACIN 50000 [IU]/1
INJECTION, POWDER, FOR SOLUTION INTRAMUSCULAR
Status: DISCONTINUED | OUTPATIENT
Start: 2019-07-19 | End: 2019-07-19 | Stop reason: HOSPADM

## 2019-07-19 RX ADMIN — ONDANSETRON 4 MG: 2 INJECTION INTRAMUSCULAR; INTRAVENOUS at 12:07

## 2019-07-19 RX ADMIN — DEXAMETHASONE SODIUM PHOSPHATE 4 MG: 4 INJECTION, SOLUTION INTRAMUSCULAR; INTRAVENOUS at 11:07

## 2019-07-19 RX ADMIN — FENTANYL CITRATE 25 MCG: 50 INJECTION, SOLUTION INTRAMUSCULAR; INTRAVENOUS at 11:07

## 2019-07-19 RX ADMIN — LIDOCAINE HYDROCHLORIDE 100 MG: 20 INJECTION, SOLUTION INTRAVENOUS at 11:07

## 2019-07-19 RX ADMIN — FENTANYL CITRATE 25 MCG: 50 INJECTION INTRAMUSCULAR; INTRAVENOUS at 12:07

## 2019-07-19 RX ADMIN — ACETAMINOPHEN 1000 MG: 10 INJECTION, SOLUTION INTRAVENOUS at 11:07

## 2019-07-19 RX ADMIN — KETAMINE HYDROCHLORIDE 30 MG: 10 INJECTION, SOLUTION INTRAMUSCULAR; INTRAVENOUS at 11:07

## 2019-07-19 RX ADMIN — SODIUM CHLORIDE, SODIUM GLUCONATE, SODIUM ACETATE, POTASSIUM CHLORIDE, MAGNESIUM CHLORIDE, SODIUM PHOSPHATE, DIBASIC, AND POTASSIUM PHOSPHATE: .53; .5; .37; .037; .03; .012; .00082 INJECTION, SOLUTION INTRAVENOUS at 11:07

## 2019-07-19 RX ADMIN — PHENYLEPHRINE HYDROCHLORIDE 200 MCG: 10 INJECTION INTRAVENOUS at 12:07

## 2019-07-19 RX ADMIN — ROCURONIUM BROMIDE 5 MG: 10 INJECTION, SOLUTION INTRAVENOUS at 11:07

## 2019-07-19 RX ADMIN — PHENYLEPHRINE HYDROCHLORIDE 100 MCG: 10 INJECTION INTRAVENOUS at 11:07

## 2019-07-19 RX ADMIN — HYDROMORPHONE HYDROCHLORIDE 1 MG: 1 INJECTION, SOLUTION INTRAMUSCULAR; INTRAVENOUS; SUBCUTANEOUS at 05:07

## 2019-07-19 RX ADMIN — FENTANYL CITRATE 25 MCG: 50 INJECTION, SOLUTION INTRAMUSCULAR; INTRAVENOUS at 12:07

## 2019-07-19 RX ADMIN — FENTANYL CITRATE 100 MCG: 50 INJECTION, SOLUTION INTRAMUSCULAR; INTRAVENOUS at 11:07

## 2019-07-19 RX ADMIN — PROPOFOL 50 MG: 10 INJECTION, EMULSION INTRAVENOUS at 12:07

## 2019-07-19 RX ADMIN — DEXTROSE 6 G: 5 SOLUTION INTRAVENOUS at 05:07

## 2019-07-19 RX ADMIN — PROPOFOL 20 MG: 10 INJECTION, EMULSION INTRAVENOUS at 12:07

## 2019-07-19 RX ADMIN — FENTANYL CITRATE 25 MCG: 50 INJECTION INTRAMUSCULAR; INTRAVENOUS at 01:07

## 2019-07-19 RX ADMIN — VANCOMYCIN HYDROCHLORIDE 1000 MG: 1 INJECTION, POWDER, LYOPHILIZED, FOR SOLUTION INTRAVENOUS at 05:07

## 2019-07-19 RX ADMIN — HYDROMORPHONE HYDROCHLORIDE 1 MG: 1 INJECTION, SOLUTION INTRAMUSCULAR; INTRAVENOUS; SUBCUTANEOUS at 07:07

## 2019-07-19 RX ADMIN — PHENYLEPHRINE HYDROCHLORIDE 50 MCG: 10 INJECTION INTRAVENOUS at 11:07

## 2019-07-19 RX ADMIN — ROCURONIUM BROMIDE 20 MG: 10 INJECTION, SOLUTION INTRAVENOUS at 11:07

## 2019-07-19 RX ADMIN — SUCCINYLCHOLINE CHLORIDE 120 MG: 20 INJECTION, SOLUTION INTRAMUSCULAR; INTRAVENOUS at 11:07

## 2019-07-19 RX ADMIN — KETOROLAC TROMETHAMINE 30 MG: 30 INJECTION, SOLUTION INTRAMUSCULAR; INTRAVENOUS at 12:07

## 2019-07-19 RX ADMIN — PROPOFOL 200 MG: 10 INJECTION, EMULSION INTRAVENOUS at 11:07

## 2019-07-19 RX ADMIN — RAMELTEON 8 MG: 8 TABLET, FILM COATED ORAL at 01:07

## 2019-07-19 RX ADMIN — MIDAZOLAM HYDROCHLORIDE 2 MG: 1 INJECTION, SOLUTION INTRAMUSCULAR; INTRAVENOUS at 11:07

## 2019-07-19 NOTE — PLAN OF CARE
CM at bedside to discuss discharge planning assessment.   Patient is currently off unit for echo.  CM name and phone # written on board.   Will attempt discharge planning assessment at a later date.     07/19/19 1600   Discharge Assessment   Assessment Type Discharge Planning Assessment
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
## 2019-07-19 NOTE — ANESTHESIA PREPROCEDURE EVALUATION
07/19/2019  Marcial Thomas is a 38 y.o., male.    Anesthesia Evaluation    I have reviewed the Patient Summary Reports.    I have reviewed the Nursing Notes.   I have reviewed the Medications.     Review of Systems  Anesthesia Hx:  No problems with previous Anesthesia  History of prior surgery of interest to airway management or planning: Previous anesthesia: General  Denies Personal Hx of Anesthesia complications.   Social:  IVDU   Cardiovascular:   Hypertension    Pulmonary:  Pulmonary Normal    Renal/:  Renal/ Normal     Hepatic/GI:  Hepatic/GI Normal    Musculoskeletal:  Spine Disorders: cervical and lumbar    Neurological:   Neuromuscular Disease,    Endocrine:  Endocrine Normal    Psych:   Psychiatric History Opioid use disorder       Patient Active Problem List   Diagnosis    Essential hypertension    History of fusion of cervical spine 2006    Nicotine dependence, cigarettes, uncomplicated    Lumbar radiculopathy 7/2016 MRI with degeneration, mild central stenosis L3-4    Substance use disorder IVDU    Opioid use disorder    IVDU (intravenous drug user)    Abscess, prevertebral soft tissue of neck    Abscess    Nodule of left lobe of thyroid gland incidental on CT 7/2019 8 mm rec's thyroid US    Bacteremia due to Staphylococcus     Past Medical History:   Diagnosis Date    Anxiety     Hypertension     Neuromuscular disorder      Past Surgical History:   Procedure Laterality Date    cervicle fusion      EPIDURAL BLOCK INJECTION      lumbar x 2         Physical Exam  General:  Well nourished    Airway/Jaw/Neck:  Airway Findings: Mouth Opening: Normal Tongue: Normal  General Airway Assessment: Adult  Mallampati: II  TM Distance: Normal, at least 6 cm  Jaw/Neck Findings:  Neck ROM: Normal ROM      Dental:  Dental Findings: In tact   Chest/Lungs:  Chest/Lungs Findings: Clear to  auscultation     Heart/Vascular:  Heart Findings: Rate: Normal  Rhythm: Regular Rhythm  Sounds: Normal        Mental Status:  Mental Status Findings:  Cooperative, Alert and Oriented         Anesthesia Plan  Type of Anesthesia, risks & benefits discussed:  Anesthesia Type:  general  Patient's Preference:   Intra-op Monitoring Plan: standard ASA monitors  Intra-op Monitoring Plan Comments:   Post Op Pain Control Plan: per primary service following discharge from PACU  Post Op Pain Control Plan Comments:   Induction:   IV  Beta Blocker:  Patient is not currently on a Beta-Blocker (No further documentation required).       Informed Consent: Patient understands risks and agrees with Anesthesia plan.  Questions answered. Anesthesia consent signed with patient.  ASA Score: 2     Day of Surgery Review of History & Physical:    H&P update referred to the surgeon.         Ready For Surgery From Anesthesia Perspective.

## 2019-07-19 NOTE — PLAN OF CARE
CM at bedside to discuss discharge planning assessment.   Patient currently off unit for echo.  CM name and phone # written on board.   Will attempt discharge planning assessment at later date.     07/19/19 1600   Discharge Assessment   Assessment Type Discharge Planning Assessment

## 2019-07-19 NOTE — HOSPITAL COURSE
07/19/2019 I&D by ENT of cervical abscess. Patient reports felling well with decreased pain and inflammation at site prior to procedure. Pain well controlled with dilaudid. POD #1 pt tearful stating that his pain is not well controlled, pain regiment changed. Pt left the unit and returned with an infiltrated IV noted to be very erratic and agitated by his nurse. Staff were suspicious that he may have injected something through his IV while he was off the floor today. He was administrated 1 mg Ativan, however he was still agitated, anxious with rapid speech. Pt was threatening to leave AMA he was informed of risk of infection with a drain in his neck and need for IV abx. Staff was able to calm him, PICC placed 2 IV's in him and after they infiltrated he left AMA without paperwork, noting they nobody was helping him here. Pt's girlfriend still on the unit and unsure where he went. We stressed to pt's girlfriend that it is imperative that he seek medical care and that he is at a high risk for infection from his drain.

## 2019-07-19 NOTE — ANESTHESIA POSTPROCEDURE EVALUATION
Anesthesia Post Evaluation    Patient: Marcila Thomas    Procedure(s) Performed: Procedure(s) (LRB):  INCISION AND DRAINAGE, ABSCESS, prevertebral (Right)    Final Anesthesia Type: general  Patient location during evaluation: PACU  Patient participation: Yes- Able to Participate  Level of consciousness: awake and alert and oriented  Post-procedure vital signs: reviewed and stable  Pain management: adequate  Airway patency: patent  PONV status at discharge: No PONV  Anesthetic complications: no      Cardiovascular status: hemodynamically stable  Respiratory status: unassisted, spontaneous ventilation and room air  Hydration status: euvolemic  Follow-up not needed.          Vitals Value Taken Time   /84 7/19/2019  3:16 PM   Temp 36.4 °C (97.6 °F) 7/19/2019  2:40 PM   Pulse 70 7/19/2019  3:16 PM   Resp 14 7/19/2019  3:16 PM   SpO2 98 % 7/19/2019  3:16 PM   Vitals shown include unvalidated device data.      Event Time     Out of Recovery 07/19/2019 13:54:41          Pain/Abhi Score: Pain Rating Prior to Med Admin: 6 (7/19/2019  1:05 PM)  Pain Rating Post Med Admin: 5 (7/19/2019  5:55 AM)  Abhi Score: 10 (7/19/2019  1:26 PM)

## 2019-07-19 NOTE — PROGRESS NOTES
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 IVDU, although states he quit a year ago. Also known history of cellulitis and leg abscess in the past. S/p I&D of cervical abscess by ENT today. Admission Blcx growing staph aureus. Neck flex/ext Xrays done for further neurosurgery evaluation for previous hardware.     Plan  Per recs:   -Treatment with cefazolin & vancomycin, ID following, appreciate recs  -follow up NSGY opinion  - IV dexamethasone 8mg q8h x 3 doses   -MRI w and w/o contrast of spine  -NPO at midnight       Bacteremia due to Staphylococcus  - See principal problem for more elaboration.       Abscess  S/p I&D by ENT.     IVDU (intravenous drug user)  Patient stopped 1 year ago. Pain managed on Dilaudid, can add flexeril    Nicotine dependence, cigarettes, uncomplicated  Patient denying cravings    History of fusion of cervical spine 2006        Essential hypertension  -stable, continue to monitor      VTE Risk Mitigation (From admission, onward)        Ordered     IP VTE LOW RISK PATIENT  Once      07/18/19 8663                Velia Roche MD  Department of Hospital Medicine   [facility]

## 2019-07-19 NOTE — PROGRESS NOTES
Pharmacokinetic Assessment Follow Up: IV Vancomycin    Vancomycin serum concentration assessment(s):    The measurement is below the desired definitive target range of 15 to 20 mcg/mL.    Vancomycin Regimen Plan:    Change regimen to Vancomycin 1500 mg IV every 12hour with next serum trough concentration measured at 0430 prior to 4th dose on 7/21    Pharmacy will continue to follow and monitor vancomycin.    Please contact pharmacy at extension [phone] for questions regarding this assessment.    Thank you for the consult,   Dada Godoy     Patient brief summary:  Marcial Thomas is a 38 y.o. male initiated on antimicrobial therapy with IV Vancomycin for treatment of suspected bacteremia    The patient received a loading dose, followed by the current treatment regimen: vancomycin 1000 mg IV every 12 hours    Drug Allergies:   Review of patient's allergies indicates:   Allergen Reactions    Vicodin [hydrocodone-acetaminophen] Hives       Actual Body Weight:   54.4 kg    Renal Function:   Estimated Creatinine Clearance: 107.9 mL/min (based on SCr of 0.7 mg/dL).,       CBC (last 72 hours):  Recent Labs   Lab Result Units 07/17/19  1812 07/19/19  0647   WBC K/uL 32.49* 32.16*   Hemoglobin g/dL 11.8* 11.3*   Hematocrit % 36.3* 35.0*   Platelets K/uL 658* 599*   Gran% % 84.9* 92.0*   Lymph% % 7.7* 4.9*   Mono% % 8.0 2.0*   Eosinophil% % 0.0 0.0   Basophil% % 0.1 0.1   Differential Method  Automated Automated       Metabolic Panel (last 72 hours):  Recent Labs   Lab Result Units 07/17/19  1943 07/17/19  2138 07/19/19  0647   Sodium mmol/L  --  135* 133*   Potassium mmol/L  --  3.9 4.6   Chloride mmol/L  --  99 96   CO2 mmol/L  --  25 27   Glucose mg/dL  --  119* 156*   Glucose, UA  Negative  --   --    BUN, Bld mg/dL  --  12 23*   Creatinine mg/dL  --  0.9 0.7   Albumin g/dL  --  2.8* 2.4*   Total Bilirubin mg/dL  --  0.3 0.2   Alkaline Phosphatase U/L  --  88 77   AST U/L  --  10 10   ALT U/L  --  11 11   Magnesium  mg/dL  --   --  1.8   Phosphorus mg/dL  --   --  3.2       Vancomycin Administrations:  vancomycin given in the last 96 hours                   vancomycin in dextrose 5 % 1 gram/250 mL IVPB 1,000 mg (mg) 1,000 mg New Bag 07/19/19 1717    vancomycin in dextrose 5 % 1 gram/250 mL IVPB 1,000 mg (mg) 1,000 mg New Bag 07/18/19 2329     1,000 mg New Bag  1251    vancomycin in dextrose 5 % 1 gram/250 mL IVPB 1,000 mg (mg) 1,000 mg New Bag 07/17/19 2328                Drug levels (last 3 results):  Recent Labs   Lab Result Units 07/19/19  1528   Vancomycin-Trough ug/mL 4.8*       Microbiologic Results:  Microbiology Results (last 7 days)     Procedure Component Value Units Date/Time    Gram stain [[id number]] Collected:  07/19/19 1212    Order Status:  Completed Specimen:  Abscess from Neck Updated:  07/19/19 1456     Gram Stain Result Few WBC's      Few Gram positive cocci    Narrative:       1. Swab from Deep Neck Abscess  2. Swab from Deep Neck Abscess    AFB Culture & Smear [[id number]] Collected:  07/19/19 1212    Order Status:  Sent Specimen:  Abscess from Neck Updated:  07/19/19 1224    Fungus culture [[id number]] Collected:  07/19/19 1212    Order Status:  Sent Specimen:  Abscess from Neck Updated:  07/19/19 1223    Culture, Anaerobe [[id number]] Collected:  07/19/19 1212    Order Status:  Sent Specimen:  Abscess from Neck Updated:  07/19/19 1222    Aerobic culture [[id number]] Collected:  07/19/19 1212    Order Status:  Sent Specimen:  Abscess from Neck Updated:  07/19/19 1221    Blood Culture #2 **CANNOT BE ORDERED STAT** [[id number]]  (Abnormal) Collected:  07/17/19 1845    Order Status:  Completed Specimen:  Blood from Peripheral, Antecubital, Right Updated:  07/19/19 0842     Blood Culture, Routine Gram stain yoav bottle: Gram positive cocci in clusters resembling Staph       Positive results previously called 07/18/2019  09:45      Gram stain aer bottle:  Gram positive cocci in clusters resembling Staph      Positive  results previously called 07/18/2019  18:27      STAPHYLOCOCCUS AUREUS  Susceptibility pending      Blood Culture #1 **CANNOT BE ORDERED STAT** [[id number]]  (Abnormal) Collected:  07/17/19 1812    Order Status:  Completed Specimen:  Blood from Peripheral, Antecubital, Left Updated:  07/19/19 0840     Blood Culture, Routine Gram stain aer bottle and  Gram stain yoav bottle: Gram positive cocci in       clusters resembling Staph       Results called to and read back by: Jammie Wilkes RN at [facility]        07/18/2019  09:43      STAPHYLOCOCCUS AUREUS  Susceptibility pending      Blood culture [[id number]] Collected:  07/19/19 0019    Order Status:  Completed Specimen:  Blood Updated:  07/19/19 0745     Blood Culture, Routine No Growth to date    Blood culture [[id number]] Collected:  07/19/19 0019    Order Status:  Completed Specimen:  Blood Updated:  07/19/19 0745     Blood Culture, Routine No Growth to date

## 2019-07-19 NOTE — PLAN OF CARE
CM at bedside to discuss discharge planning assessment.   Patient is currently off unit for an echo.  CM name and phone # written on board.  Will attempt discharge planning assessment at later date.

## 2019-07-19 NOTE — BRIEF OP NOTE
.  [facility]  Brief Operative Note    SUMMARY     Surgery Date: 7/19/2019     Surgeon(s) and Role:     * Kole Cornelius MD - Primary     * Byron Asencio MD - Resident - Assisting     * Mi Hinojosa MD - Consult        Pre-op Diagnosis:  Abscess, neck [L02.11]    Post-op Diagnosis:  Post-Op Diagnosis Codes:     * Abscess, neck [L02.11]    Procedure(s) (LRB):  INCISION AND DRAINAGE, ABSCESS, prevertebral (Right)    Anesthesia: General    Description of Procedure: Incision and drainage of deep neck space abscess    Description of the findings of the procedure: Sterling pus extruded from deep neck space. See operative note for further details.    Estimated Blood Loss: * No values recorded between 7/19/2019 11:46 AM and 7/19/2019 12:37 PM *         Specimens:   Specimen (12h ago, onward)    None

## 2019-07-19 NOTE — NURSING TRANSFER
Nursing Transfer Note      7/19/2019     Transfer To: [address]    Transfer via stretcher    Transfer with cardiac monitoring via centralized telemetry, IV pump    Transported by PCT    Medicines sent: none    Chart send with patient: Yes    Notified: ---    Patient reassessed at: 7/19/2019 1:30 PM
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
## 2019-07-19 NOTE — PLAN OF CARE
Chart reviewed. Pt in OR for I&D of retropharyngeal abscess. Surgical cultures pending.   Blood cultures +S. Aureus. Susceptibilities pending  Continue vancomycin and cefazolin  Repeat blood cultures tomorrow AM  ID will follow closely with you     Thank you, if you have any questions over the weekend please contact Delia Rodriguez or ID on call Dr. Lu.     Rod Sharma

## 2019-07-19 NOTE — PT/OT/SLP PROGRESS
Occupational Therapy      Patient Name:  Marcial Thomas   MRN:  [id number]    Patient not seen today secondary to CT/MRI. Will follow-up Saturday 7/20/2019.    Alfredo Clark, OT  7/19/2019

## 2019-07-19 NOTE — PT/OT/SLP PROGRESS
Physical Therapy      Patient Name:  Marcial Thomas   MRN:  [id number]    Attempted to visit pt in AM for initial evaluation - pt off floor for scheduled I&D.  PT unable to return later in day for additional attempt. Will follow-up next scheduled date.    David Whitt, PT   7/19/2019

## 2019-07-19 NOTE — TRANSFER OF CARE
"Anesthesia Transfer of Care Note    Patient: Marcial Thomas    Procedure(s) Performed: Procedure(s) (LRB):  INCISION AND DRAINAGE, ABSCESS, prevertebral (Bilateral)    Patient location: PACU    Anesthesia Type: general    Transport from OR: Transported from OR on 6-10 L/min O2 by face mask with adequate spontaneous ventilation    Post pain: adequate analgesia    Post assessment: tolerated procedure well and no apparent anesthetic complications    Post vital signs: stable    Level of consciousness: awake, alert and oriented    Nausea/Vomiting: no nausea/vomiting    Complications: none    Transfer of care protocol was followed      Last vitals:   Visit Vitals  /79 (BP Location: Right arm, Patient Position: Lying)   Pulse 75   Temp 36.7 °C (98 °F) (Oral)   Resp 16   Ht 5' 9" (1.753 m)   Wt 54.4 kg (120 lb)   SpO2 98%   BMI 17.72 kg/m²     "

## 2019-07-19 NOTE — PLAN OF CARE
Problem: Adult Inpatient Plan of Care  Goal: Plan of Care Review  Outcome: Ongoing (interventions implemented as appropriate)  Patient remained free of falls and injuries during shift.  Patient took medications without incident.  Patient maintained NPO after midnight per order.   No other concerns noted.

## 2019-07-19 NOTE — ASSESSMENT & PLAN NOTE
Patient presenting with worsening neck and shoulder pain for 2 weeks. CT scan retropharyngeal/prevertebral abscess spanning from the C4 through C6 levels, noting the presence of postsurgical ACDF changes at the C5-6 level. Known history of IVDU, although states he quit a year ago. Also known history of cellulitis and leg abscess in the past. S/p I&D of cervical abscess by ENT today. Admission Blcx growing staph aureus. Neck flex/ext Xrays done for further neurosurgery evaluation for previous hardware.     Plan  Per recs:   -Treatment with cefazolin & vancomycin, ID following, appreciate recs  -follow up NSGY opinion  - IV dexamethasone 8mg q8h x 3 doses   -MRI w and w/o contrast of spine  -NPO at midnight

## 2019-07-19 NOTE — CARE UPDATE
MRI reviewed. Prevertebral abscess is apparent. Concern that hardware is a nidus for infection in the prevertebral space as abscess formed around hardware. Regardless, patient needs I&D and wash out.     Per infectious disease, ideally needs removal of hardware for ultimate source control. Per NSGY, patient is not completely fused so hardware may not be removed.     Plan:   -will add on for I&D of prevertebral abscess with Dr. Cornelius tomorrow and obtain cultures   -NPO at MN  -will obtain consent tomorrow   -continue IV abx   -ENT following, call with questions
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
## 2019-07-19 NOTE — PROGRESS NOTES
I was asked to render a second opinion regarding the surgical POC for this 38M with a history IV drug abuse and now a prevertebral abscess with extension to the cervical spine. Based on radiographs and history alone, without having examined the patient myself, I consider the current POC recommendation from the Neurosurgery Service to be a reasonable one. Additionally I would recommend a flexion extension cervical xray to demonstrate no instability before finalizing the plan.

## 2019-07-19 NOTE — SUBJECTIVE & OBJECTIVE
"Interval History: NAEON. Pain a little better controlled. He reports that he was told in the past that his cervical bone is like "chalk" or like an 80 year old man. No trouble breathing.     Medications:  Continuous Infusions:  Scheduled Meds:   ceFAZolin(ANCEF) in D5W 500 mL CONTINUOUS INFUSION  6 g Intravenous Q24H    LORazepam  1 mg Oral Once    nicotine  1 patch Transdermal Once    polyethylene glycol  17 g Oral Daily    senna-docusate 8.6-50 mg  1 tablet Oral BID    vancomycin (VANCOCIN) IVPB  20 mg/kg Intravenous Q12H     PRN Meds:Dextrose 10% Bolus, Dextrose 10% Bolus, glucagon (human recombinant), glucose, glucose, HYDROmorphone, ramelteon, sodium chloride 0.9%, sodium chloride 0.9%     Review of patient's allergies indicates:   Allergen Reactions    Vicodin [hydrocodone-acetaminophen] Hives     Objective:     Vital Signs (24h Range):  Temp:  [98 °F (36.7 °C)-100 °F (37.8 °C)] 98 °F (36.7 °C)  Pulse:  [] 79  Resp:  [12-24] 17  SpO2:  [94 %-100 %] 98 %  BP: (127-182)/() 146/88       Lines/Drains/Airways     Peripheral Intravenous Line                 Peripheral IV - Single Lumen 07/18/19 1404 20 G Left Upper Arm less than 1 day                Physical Exam   NAD, appears more comfortable this AM   Voice clear, deep without change. Not muffled   NC/AT, EOMI   MMM, fair dentition, no oropharyngeal swelling  Neck diffusely tender to palpation, poor ROM , point tenderness over cervical spine   Normal work of breathing, no stridor/stertor       Significant Labs:  CBC:   Recent Labs   Lab 07/17/19  1812   WBC 32.49*   RBC 4.07*   HGB 11.8*   HCT 36.3*   *   MCV 89   MCH 29.0   MCHC 32.5     CMP:   Recent Labs   Lab 07/17/19  2138   *   CALCIUM 9.3   ALBUMIN 2.8*   PROT 8.2   *   K 3.9   CO2 25   CL 99   BUN 12   CREATININE 0.9   ALKPHOS 88   ALT 11   AST 10   BILITOT 0.3       Significant Diagnostics:  I have reviewed all pertinent imaging results/findings within the past 24 " hours.

## 2019-07-20 VITALS
TEMPERATURE: 99 F | SYSTOLIC BLOOD PRESSURE: 167 MMHG | WEIGHT: 120 LBS | HEIGHT: 69 IN | RESPIRATION RATE: 24 BRPM | DIASTOLIC BLOOD PRESSURE: 103 MMHG | BODY MASS INDEX: 17.77 KG/M2 | HEART RATE: 103 BPM | OXYGEN SATURATION: 100 %

## 2019-07-20 LAB
BACTERIA BLD CULT: ABNORMAL
POCT GLUCOSE: 242 MG/DL (ref 70–110)

## 2019-07-20 PROCEDURE — 25000003 PHARM REV CODE 250: Performed by: INTERNAL MEDICINE

## 2019-07-20 PROCEDURE — 63600175 PHARM REV CODE 636 W HCPCS: Performed by: STUDENT IN AN ORGANIZED HEALTH CARE EDUCATION/TRAINING PROGRAM

## 2019-07-20 PROCEDURE — 25000003 PHARM REV CODE 250: Performed by: STUDENT IN AN ORGANIZED HEALTH CARE EDUCATION/TRAINING PROGRAM

## 2019-07-20 PROCEDURE — 63600175 PHARM REV CODE 636 W HCPCS: Performed by: INTERNAL MEDICINE

## 2019-07-20 PROCEDURE — 99239 HOSP IP/OBS DSCHRG MGMT >30: CPT | Mod: GC,,, | Performed by: INTERNAL MEDICINE

## 2019-07-20 PROCEDURE — 99239 PR HOSPITAL DISCHARGE DAY,>30 MIN: ICD-10-PCS | Mod: GC,,, | Performed by: INTERNAL MEDICINE

## 2019-07-20 RX ORDER — LORAZEPAM 0.5 MG/1
1 TABLET ORAL ONCE
Status: COMPLETED | OUTPATIENT
Start: 2019-07-20 | End: 2019-07-20

## 2019-07-20 RX ORDER — OXYCODONE AND ACETAMINOPHEN 5; 325 MG/1; MG/1
1 TABLET ORAL EVERY 6 HOURS PRN
Status: DISCONTINUED | OUTPATIENT
Start: 2019-07-20 | End: 2019-07-20

## 2019-07-20 RX ORDER — NICOTINE 7MG/24HR
1 PATCH, TRANSDERMAL 24 HOURS TRANSDERMAL DAILY
Status: DISCONTINUED | OUTPATIENT
Start: 2019-07-20 | End: 2019-07-20 | Stop reason: HOSPADM

## 2019-07-20 RX ORDER — ONDANSETRON 4 MG/1
4 TABLET, FILM COATED ORAL ONCE
Status: DISCONTINUED | OUTPATIENT
Start: 2019-07-20 | End: 2019-07-20 | Stop reason: HOSPADM

## 2019-07-20 RX ORDER — HYDROMORPHONE HYDROCHLORIDE 1 MG/ML
1 INJECTION, SOLUTION INTRAMUSCULAR; INTRAVENOUS; SUBCUTANEOUS EVERY 6 HOURS PRN
Status: DISCONTINUED | OUTPATIENT
Start: 2019-07-20 | End: 2019-07-20

## 2019-07-20 RX ORDER — LIDOCAINE 50 MG/G
1 PATCH TOPICAL DAILY
Status: DISCONTINUED | OUTPATIENT
Start: 2019-07-20 | End: 2019-07-20 | Stop reason: HOSPADM

## 2019-07-20 RX ORDER — HYDROMORPHONE HYDROCHLORIDE 1 MG/ML
0.5 INJECTION, SOLUTION INTRAMUSCULAR; INTRAVENOUS; SUBCUTANEOUS EVERY 6 HOURS PRN
Status: DISCONTINUED | OUTPATIENT
Start: 2019-07-20 | End: 2019-07-20 | Stop reason: HOSPADM

## 2019-07-20 RX ORDER — ACETAMINOPHEN 500 MG
1000 TABLET ORAL ONCE
Status: COMPLETED | OUTPATIENT
Start: 2019-07-20 | End: 2019-07-20

## 2019-07-20 RX ORDER — HYDROCODONE BITARTRATE AND ACETAMINOPHEN 5; 325 MG/1; MG/1
1 TABLET ORAL EVERY 6 HOURS PRN
Status: DISCONTINUED | OUTPATIENT
Start: 2019-07-20 | End: 2019-07-20 | Stop reason: HOSPADM

## 2019-07-20 RX ORDER — ACETAMINOPHEN 325 MG/1
650 TABLET ORAL EVERY 6 HOURS PRN
Status: DISCONTINUED | OUTPATIENT
Start: 2019-07-20 | End: 2019-07-20 | Stop reason: HOSPADM

## 2019-07-20 RX ORDER — DICYCLOMINE HYDROCHLORIDE 20 MG/1
20 TABLET ORAL EVERY 4 HOURS PRN
Status: DISCONTINUED | OUTPATIENT
Start: 2019-07-20 | End: 2019-07-20 | Stop reason: HOSPADM

## 2019-07-20 RX ORDER — CYCLOBENZAPRINE HCL 5 MG
5 TABLET ORAL ONCE
Status: COMPLETED | OUTPATIENT
Start: 2019-07-20 | End: 2019-07-20

## 2019-07-20 RX ORDER — CYCLOBENZAPRINE HCL 5 MG
5 TABLET ORAL 3 TIMES DAILY PRN
Status: DISCONTINUED | OUTPATIENT
Start: 2019-07-20 | End: 2019-07-20 | Stop reason: HOSPADM

## 2019-07-20 RX ADMIN — LORAZEPAM 1 MG: 0.5 TABLET ORAL at 12:07

## 2019-07-20 RX ADMIN — ACETAMINOPHEN 1000 MG: 500 TABLET ORAL at 03:07

## 2019-07-20 RX ADMIN — CYCLOBENZAPRINE HYDROCHLORIDE 5 MG: 5 TABLET, FILM COATED ORAL at 09:07

## 2019-07-20 RX ADMIN — CYCLOBENZAPRINE HYDROCHLORIDE 5 MG: 5 TABLET, FILM COATED ORAL at 02:07

## 2019-07-20 RX ADMIN — HYDROMORPHONE HYDROCHLORIDE 1 MG: 1 INJECTION, SOLUTION INTRAMUSCULAR; INTRAVENOUS; SUBCUTANEOUS at 09:07

## 2019-07-20 RX ADMIN — HYDROMORPHONE HYDROCHLORIDE 1 MG: 1 INJECTION, SOLUTION INTRAMUSCULAR; INTRAVENOUS; SUBCUTANEOUS at 01:07

## 2019-07-20 RX ADMIN — HYDROMORPHONE HYDROCHLORIDE 1 MG: 1 INJECTION, SOLUTION INTRAMUSCULAR; INTRAVENOUS; SUBCUTANEOUS at 03:07

## 2019-07-20 RX ADMIN — VANCOMYCIN HYDROCHLORIDE 1500 MG: 1.5 INJECTION, POWDER, LYOPHILIZED, FOR SOLUTION INTRAVENOUS at 04:07

## 2019-07-20 NOTE — PLAN OF CARE
Problem: Adult Inpatient Plan of Care  Goal: Plan of Care Review  Outcome: Ongoing (interventions implemented as appropriate)  Patient remained free of falls and injuries during shift.  Patient took medications without incident.  Patient complained of pain during shift that was minimally controlled with medication.
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
## 2019-07-20 NOTE — CONSULTS
Attempted PIV, Pt moving and smal infiltrate noted with saline flush. Attempted other side and pt stated had enough and was leaving.

## 2019-07-20 NOTE — NURSING
"Patient left unit without IV access when asked where he was going he stated "I'm going home I dont need this shit." MD paged.  "

## 2019-07-20 NOTE — NURSING
"Physician at bedside and the patient agreed to stay to receive treatment. The patient stated, "I feel like im not worth it anymore" and "I wound rather jump out of that window then feel like this anymore." Patient tearful. MD stated that she believes that he is not a threat to himself at this time. Left in room with PICC team and MD.   "

## 2019-07-20 NOTE — CARE UPDATE
Went to round on patient, was informed he left AMA. No neurosurgical intervention was indicated, if he returns to the hospital again, please feel free to contact us again with any questions or concerns.    Kole Pederson MD  Neurosurgery
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
## 2019-07-20 NOTE — SIGNIFICANT EVENT
Pt was moving while IV was attempted by PICC team and it infiltrated. It was attempted again on the other side, after which he eloped stating that he had enough. Pt reports that his pain was not being address appropriately, but did not appear to be in pain as he left the hospital.

## 2019-07-20 NOTE — CONSULTS
Therapy with Vancomycin complete and/or consult discontinued by provider.  Pharmacy will sign off, please re-consult as needed.      Angelita Zavala, AnnaD  [id number]

## 2019-07-20 NOTE — PT/OT/SLP PROGRESS
Physical Therapy      Patient Name:  Marcial Thomas   MRN:  [id number]    Patient not seen today secondary to refusal.  PT unable to return later in day for attempt. Will follow-up next scheduled date.    David Whitt, PT   7/20/2019

## 2019-07-20 NOTE — SUBJECTIVE & OBJECTIVE
Interval History: POD #1 I&D prevertebral abscess.  Blood cultures showing MSSA.   Surgical cultures pending.    Seen this morning.  Complaining of severe pain, very agitated.      Review of Systems   Constitutional: Positive for chills and fever.   HENT: Positive for trouble swallowing.         (+) dysphagia   Respiratory: Negative for cough and shortness of breath.    Cardiovascular: Negative for chest pain and leg swelling.   Gastrointestinal: Negative for abdominal pain, constipation, diarrhea, nausea and vomiting.   Genitourinary: Negative for dysuria, frequency and hematuria.   Musculoskeletal: Positive for neck pain. Negative for back pain and myalgias.   Skin: Negative for rash and wound.   Neurological: Negative for dizziness, light-headedness and headaches.   Psychiatric/Behavioral: Negative for agitation and behavioral problems. The patient is not nervous/anxious.      Objective:     Vital Signs (Most Recent):  Temp: 97.6 °F (36.4 °C) (07/20/19 0804)  Pulse: 67 (07/20/19 0804)  Resp: 19 (07/20/19 0804)  BP: (!) 155/87 (07/20/19 0804)  SpO2: 98 % (07/20/19 0804) Vital Signs (24h Range):  Temp:  [97.3 °F (36.3 °C)-98.7 °F (37.1 °C)] 97.6 °F (36.4 °C)  Pulse:  [] 67  Resp:  [14-20] 19  SpO2:  [95 %-100 %] 98 %  BP: (118-155)/(83-91) 155/87     Weight: 54.4 kg (120 lb)  Body mass index is 17.72 kg/m².    Estimated Creatinine Clearance: 107.9 mL/min (based on SCr of 0.7 mg/dL).    Physical Exam   Constitutional: He is oriented to person, place, and time. He appears well-developed and well-nourished. He appears distressed.   HENT:   Anterior neck surgical dressing with penrose.  Small amount sanguinous drainage    Neck: Muscular tenderness present. Edema and decreased range of motion present. No erythema present.   Cardiovascular: Regular rhythm. Tachycardia present.   No murmur heard.  Pulmonary/Chest: Effort normal and breath sounds normal. No respiratory distress.   Abdominal: Soft. He exhibits no  distension.   Musculoskeletal: He exhibits no edema.   Lymphadenopathy:        Head (right side): No submental, no submandibular, no tonsillar, no preauricular and no occipital adenopathy present.        Head (left side): No submental, no submandibular, no tonsillar, no preauricular and no occipital adenopathy present.   Neurological: He is alert and oriented to person, place, and time.   Skin: Skin is warm and dry. No rash noted. He is not diaphoretic.   Multiple scabs noted to arms, legs, and face in various stages of healing. No erythema or fluctuance noted.    Psychiatric: His mood appears anxious. He is agitated.       Significant Labs:   Blood Culture:   Recent Labs   Lab 07/17/19  1812 07/17/19  1845 07/19/19  0019   LABBLOO Gram stain aer bottle and  Gram stain yoav bottle: Gram positive cocci in   clusters resembling Staph   Results called to and read back by: Jammie Wilkes RN at [facility]    07/18/2019  09:43  STAPHYLOCOCCUS AUREUS* Gram stain yoav bottle: Gram positive cocci in clusters resembling Staph   Positive results previously called 07/18/2019  09:45  Gram stain aer bottle:  Gram positive cocci in clusters resembling Staph  Positive results previously called 07/18/2019  18:27  STAPHYLOCOCCUS AUREUS* No Growth to date  No Growth to date  No Growth to date  No Growth to date     CBC:   Recent Labs   Lab 07/19/19  0647   WBC 32.16*   HGB 11.3*   HCT 35.0*   *     CMP:   Recent Labs   Lab 07/19/19  0647   *   K 4.6   CL 96   CO2 27   *   BUN 23*   CREATININE 0.7   CALCIUM 9.5   PROT 7.7   ALBUMIN 2.4*   BILITOT 0.2   ALKPHOS 77   AST 10   ALT 11   ANIONGAP 10   EGFRNONAA >60.0     Wound Culture:   Recent Labs   Lab 07/19/19  1212   LABAERO STAPHYLOCOCCUS AUREUS  Moderate  Susceptibility pending  *       Significant Imaging: I have reviewed all pertinent imaging results/findings within the past 24 hours.

## 2019-07-20 NOTE — SUBJECTIVE & OBJECTIVE
"Interval History:     ONE: pt was noted to be very difficult with staff overnight, threatening to leave AMA. He was rude to phlebotomy this am refusing lab draws.     Pt was tearful and agitated as he complains about his excruciating "spasming" shoulder pain. He has minimal neck pain at site of I&D. Denies focal numbness, weakness or tingling of UE. Denies any fevers, chills, abd pain or n/v/d.       Review of Systems   Constitutional: Negative for chills and fever.   HENT: Negative for trouble swallowing.    Respiratory: Negative for cough, chest tightness and shortness of breath.    Cardiovascular: Negative for chest pain.   Gastrointestinal: Negative for abdominal pain, diarrhea, nausea and vomiting.   Musculoskeletal: Positive for back pain (chronic back pain) and neck pain.        BL shoulder spasms    Psychiatric/Behavioral: Positive for agitation. The patient is nervous/anxious.      Objective:     Vital Signs (Most Recent):  Temp: 98.6 °F (37 °C) (07/20/19 1157)  Pulse: 81 (07/20/19 1157)  Resp: 20 (07/20/19 1157)  BP: (!) 150/103 (07/20/19 1157)  SpO2: 100 % (07/20/19 1157) Vital Signs (24h Range):  Temp:  [97.3 °F (36.3 °C)-98.7 °F (37.1 °C)] 98.6 °F (37 °C)  Pulse:  [] 81  Resp:  [14-20] 20  SpO2:  [95 %-100 %] 100 %  BP: (118-155)/() 150/103     Weight: 54.4 kg (120 lb)  Body mass index is 17.72 kg/m².  No intake or output data in the 24 hours ending 07/20/19 1311   Physical Exam   Constitutional: He is oriented to person, place, and time. He appears well-developed and well-nourished. No distress.   HENT:   Head: Normocephalic and atraumatic.   Eyes: Conjunctivae are normal.   Neck: Normal range of motion.   MMM, no oropharyngeal swelling  Neck tender to palpation, restricted mild ROM 2/2 pain  penrose in place in R neck   Cardiovascular: Normal rate and regular rhythm.   Pulmonary/Chest: Effort normal and breath sounds normal. No respiratory distress. He has no wheezes.   Abdominal: Soft. " Bowel sounds are normal. There is no tenderness.   Musculoskeletal: He exhibits tenderness.   L trapezius muscular tension with TTP.   No midline C, T, L spinous process TTP.      Neurological: He is alert and oriented to person, place, and time.   Skin: Skin is warm and dry. He is not diaphoretic.   Scabs and pick marking over his face and BL upper and lower extremities.    Psychiatric: His mood appears anxious. He is agitated.   Intermittently tearful. Emotionally Labile    Nursing note and vitals reviewed.      Significant Labs: All pertinent labs within the past 24 hours have been reviewed.    Significant Imaging: I have reviewed all pertinent imaging results/findings within the past 24 hours.

## 2019-07-20 NOTE — SUBJECTIVE & OBJECTIVE
Interval History: POD #1 s/p uncomplicated I&D of prevertebral abscess. Transferred to floor w/o issues. AURELIO. Reports significantly improved pain and mobility of neck.    Medications:  Continuous Infusions:  Scheduled Meds:   ceFAZolin(ANCEF) in D5W 500 mL CONTINUOUS INFUSION  6 g Intravenous Q24H    LORazepam  1 mg Oral Once    polyethylene glycol  17 g Oral Daily    senna-docusate 8.6-50 mg  1 tablet Oral BID    vancomycin (VANCOCIN) IVPB (custom)  1,500 mg Intravenous Q12H     PRN Meds:cyclobenzaprine, Dextrose 10% Bolus, Dextrose 10% Bolus, glucagon (human recombinant), glucose, glucose, HYDROmorphone, ramelteon, sodium chloride 0.9%, sodium chloride 0.9%, sodium chloride 0.9%     Review of patient's allergies indicates:   Allergen Reactions    Vicodin [hydrocodone-acetaminophen] Hives     Objective:     Vital Signs (24h Range):  Temp:  [97.3 °F (36.3 °C)-98.7 °F (37.1 °C)] 97.6 °F (36.4 °C)  Pulse:  [] 67  Resp:  [14-20] 19  SpO2:  [95 %-100 %] 98 %  BP: (118-155)/(83-91) 155/87       Lines/Drains/Airways     Drain                 Open Drain 07/19/19 1224 Right;Anterior Neck Penrose Other (see comments) less than 1 day          Peripheral Intravenous Line                 Peripheral IV - Single Lumen 07/19/19 1130 18 G Left Foot less than 1 day                Physical Exam     NAD, appears more comfortable this AM   Voice clear, deep without change. Not muffled   NC/AT, EOMI   MMM, no oropharyngeal swelling  Neck tender to palpation but improved from pre-op exam, improved ROM w/slight restriction 2/2 pain, penrose in place in R neck   Normal work of breathing, no stridor/stertor       Significant Labs:  CBC:   Recent Labs   Lab 07/19/19  0647   WBC 32.16*   RBC 4.02*   HGB 11.3*   HCT 35.0*   *   MCV 87   MCH 28.1   MCHC 32.3     CMP:   Recent Labs   Lab 07/19/19  0647   *   CALCIUM 9.5   ALBUMIN 2.4*   PROT 7.7   *   K 4.6   CO2 27   CL 96   BUN 23*   CREATININE 0.7   ALKPHOS  77   ALT 11   AST 10   BILITOT 0.2        Significant Diagnostics:  Microbiology Results (last 7 days)     Procedure Component Value Units Date/Time    Aerobic culture [[id number]]  (Abnormal) Collected:  07/19/19 1212    Order Status:  Completed Specimen:  Abscess from Neck Updated:  07/20/19 0814     Aerobic Bacterial Culture STAPHYLOCOCCUS AUREUS  Moderate  Susceptibility pending      Narrative:       1. Swab from Deep Neck Abscess  2. Swab from Deep Neck Abscess    Blood Culture #2 **CANNOT BE ORDERED STAT** [[id number]]  (Abnormal)  (Susceptibility) Collected:  07/17/19 1845    Order Status:  Completed Specimen:  Blood from Peripheral, Antecubital, Right Updated:  07/20/19 0805     Blood Culture, Routine Gram stain yoav bottle: Gram positive cocci in clusters resembling Staph       Positive results previously called 07/18/2019  09:45      Gram stain aer bottle:  Gram positive cocci in clusters resembling Staph      Positive results previously called 07/18/2019  18:27      STAPHYLOCOCCUS AUREUS    Blood Culture #1 **CANNOT BE ORDERED STAT** [[id number]]  (Abnormal)  (Susceptibility) Collected:  07/17/19 1812    Order Status:  Completed Specimen:  Blood from Peripheral, Antecubital, Left Updated:  07/20/19 0803     Blood Culture, Routine Gram stain aer bottle and  Gram stain yoav bottle: Gram positive cocci in       clusters resembling Staph       Results called to and read back by: Jammie Wilkes RN at [facility]        07/18/2019  09:43      STAPHYLOCOCCUS AUREUS    Blood culture [[id number]] Collected:  07/19/19 0019    Order Status:  Completed Specimen:  Blood Updated:  07/20/19 0613     Blood Culture, Routine No Growth to date      No Growth to date    Blood culture [[id number]] Collected:  07/19/19 0019    Order Status:  Completed Specimen:  Blood Updated:  07/20/19 0613     Blood Culture, Routine No Growth to date      No Growth to date    Blood culture [[id number]]     Order Status:  Sent Specimen:  Blood     Blood  culture [[id number]]     Order Status:  Sent Specimen:  Blood     Gram stain [[id number]] Collected:  07/19/19 1212    Order Status:  Completed Specimen:  Abscess from Neck Updated:  07/19/19 1456     Gram Stain Result Few WBC's      Few Gram positive cocci    Narrative:       1. Swab from Deep Neck Abscess  2. Swab from Deep Neck Abscess    AFB Culture & Smear [[id number]] Collected:  07/19/19 1212    Order Status:  Sent Specimen:  Abscess from Neck Updated:  07/19/19 1224    Fungus culture [[id number]] Collected:  07/19/19 1212    Order Status:  Sent Specimen:  Abscess from Neck Updated:  07/19/19 1223    Culture, Anaerobe [[id number]] Collected:  07/19/19 1212    Order Status:  Sent Specimen:  Abscess from Neck Updated:  07/19/19 1222          X-Ray: I have reviewed all pertinent results/findings within the past 24 hours and my personal findings are:     Postoperative changes of anterior cervical fusion with associated disc replacement identified at C5/C6 level.  The position alignment is satisfactory and unchanged as compared to the previous study.  No fracture or dislocation.  No bone destruction identified.  Mild DJD.
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
## 2019-07-20 NOTE — NURSING
Patient left unit and came back to unit with an infiltrated IV which was then removed by the patient himself. Patients /103, RR 24 and extremely agitated with dilated pupils and saying pain 10/10. MD notified and order PO ativan. Patient given heat packs to help with pain. Will continue to monitor.
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
## 2019-07-20 NOTE — ASSESSMENT & PLAN NOTE
Patient presenting with worsening neck and shoulder pain for 2 weeks. CT scan retropharyngeal/prevertebral abscess spanning from the C4 through C6 levels, noting the presence of postsurgical ACDF changes at the C5-6 level. Known history of IVDU, although states he quit a year ago. Also known history of cellulitis and leg abscess in the past. S/p I&D of cervical abscess by ENT today. Admission Blcx growing staph aureus. Neck flex/ext Xrays done for further neurosurgery evaluation for previous hardware.   I&D done by ENT, penrose drain in place.     Plan  Per recs:   -Treatment with cefazolin infusion ID following, appreciate recs  - IV dexamethasone 8mg q8h x 3 doses

## 2019-07-22 ENCOUNTER — TELEPHONE (OUTPATIENT)
Dept: FAMILY MEDICINE | Facility: CLINIC | Age: 39
End: 2019-07-22

## 2019-07-22 LAB — BACTERIA SPEC AEROBE CULT: ABNORMAL

## 2019-07-22 NOTE — TELEPHONE ENCOUNTER
Spoke with the patient at length.  After much discussion I have asked him to agree to go to the emergency room for evaluation and possible readmission and possible re-attempt at PICC line placement.  He notes that the wound drain is not really draining much but possibly 1 of the stitches came out so he will need a visual inspection of the wound drain as well.

## 2019-07-22 NOTE — TELEPHONE ENCOUNTER
----- Message from Delfina Millan sent at 7/22/2019  8:11 AM CDT -----  Contact: Pt  Name of Who is Calling:COY BOSCH [[id number]]    What is the request in detail: Patient would like a call back regarding drain tube in his neck, patient know which medication he should be taking. Patient states he have staph infection in his blood .This is an urgent  Please contact to further discuss and advise    Can the clinic reply by [facility]: No  What Number to Call Back if not in [facility]: [phone]

## 2019-07-22 NOTE — TELEPHONE ENCOUNTER
Pt states he left the hospital saturday AMA. Pt is requesting advice on the next steps to take. Pt verbalized he was in pain for 14 hours with no treatment due to not having an IV.  Pt has been advised to go to the ED. Pt declined stating he was told if he left AMA he would not be treated. He is requesting a call back from PCP.   Please advise.

## 2019-07-22 NOTE — PROGRESS NOTES
This patient left AMA over the weekend with a Penrose drain and a suture that will need to be removed. Can you please schedule a follow up visit for him Wednesday? Also please try to contact him or any emergency contact to make sure he understands what needs to happen, and please document your efforts in the chart. Thanks.

## 2019-07-23 LAB — BACTERIA SPEC ANAEROBE CULT: NORMAL

## 2019-07-24 LAB
BACTERIA BLD CULT: NORMAL
BACTERIA BLD CULT: NORMAL

## 2019-07-25 ENCOUNTER — HOSPITAL ENCOUNTER (OUTPATIENT)
Facility: HOSPITAL | Age: 39
Discharge: HOME | End: 2019-07-26
Attending: EMERGENCY MEDICINE | Admitting: INTERNAL MEDICINE
Payer: MEDICARE

## 2019-07-25 DIAGNOSIS — E87.5 HYPERKALEMIA: ICD-10-CM

## 2019-07-25 DIAGNOSIS — J39.0 RETROPHARYNGEAL ABSCESS: ICD-10-CM

## 2019-07-25 DIAGNOSIS — F17.210 NICOTINE DEPENDENCE, CIGARETTES, UNCOMPLICATED: ICD-10-CM

## 2019-07-25 DIAGNOSIS — F19.90 SUBSTANCE USE DISORDER: ICD-10-CM

## 2019-07-25 DIAGNOSIS — Z98.1 HISTORY OF FUSION OF CERVICAL SPINE: ICD-10-CM

## 2019-07-25 DIAGNOSIS — I10 ESSENTIAL HYPERTENSION: ICD-10-CM

## 2019-07-25 DIAGNOSIS — M54.2 NECK PAIN: ICD-10-CM

## 2019-07-25 DIAGNOSIS — A49.01 MSSA (METHICILLIN SUSCEPTIBLE STAPHYLOCOCCUS AUREUS) INFECTION: Primary | ICD-10-CM

## 2019-07-25 LAB
ALBUMIN SERPL BCP-MCNC: 2.8 G/DL (ref 3.5–5.2)
ALP SERPL-CCNC: 87 U/L (ref 55–135)
ALT SERPL W/O P-5'-P-CCNC: 15 U/L (ref 10–44)
AMPHET+METHAMPHET UR QL: NEGATIVE
ANION GAP SERPL CALC-SCNC: 7 MMOL/L (ref 8–16)
AST SERPL-CCNC: 20 U/L (ref 10–40)
BACTERIA #/AREA URNS AUTO: NORMAL /HPF
BARBITURATES UR QL SCN>200 NG/ML: NEGATIVE
BASOPHILS # BLD AUTO: 0.03 K/UL (ref 0–0.2)
BASOPHILS NFR BLD: 0.2 % (ref 0–1.9)
BENZODIAZ UR QL SCN>200 NG/ML: NEGATIVE
BILIRUB SERPL-MCNC: 0.1 MG/DL (ref 0.1–1)
BILIRUB UR QL STRIP: NEGATIVE
BUN SERPL-MCNC: 8 MG/DL (ref 6–20)
BZE UR QL SCN: NEGATIVE
CALCIUM SERPL-MCNC: 9.3 MG/DL (ref 8.7–10.5)
CANNABINOIDS UR QL SCN: NORMAL
CHLORIDE SERPL-SCNC: 99 MMOL/L (ref 95–110)
CLARITY UR REFRACT.AUTO: ABNORMAL
CO2 SERPL-SCNC: 29 MMOL/L (ref 23–29)
COLOR UR AUTO: YELLOW
CREAT SERPL-MCNC: 0.7 MG/DL (ref 0.5–1.4)
CREAT UR-MCNC: 113 MG/DL (ref 23–375)
DIFFERENTIAL METHOD: ABNORMAL
EOSINOPHIL # BLD AUTO: 0.2 K/UL (ref 0–0.5)
EOSINOPHIL NFR BLD: 0.8 % (ref 0–8)
ERYTHROCYTE [DISTWIDTH] IN BLOOD BY AUTOMATED COUNT: 14.5 % (ref 11.5–14.5)
EST. GFR  (AFRICAN AMERICAN): >60 ML/MIN/1.73 M^2
EST. GFR  (NON AFRICAN AMERICAN): >60 ML/MIN/1.73 M^2
ETHANOL UR-MCNC: <10 MG/DL
GLUCOSE SERPL-MCNC: 87 MG/DL (ref 70–110)
GLUCOSE UR QL STRIP: NEGATIVE
HCT VFR BLD AUTO: 34.8 % (ref 40–54)
HGB BLD-MCNC: 10.7 G/DL (ref 14–18)
HGB UR QL STRIP: NEGATIVE
HYALINE CASTS UR QL AUTO: 1 /LPF
IMM GRANULOCYTES # BLD AUTO: 0.19 K/UL (ref 0–0.04)
IMM GRANULOCYTES NFR BLD AUTO: 1 % (ref 0–0.5)
KETONES UR QL STRIP: NEGATIVE
LACTATE SERPL-SCNC: 1.1 MMOL/L (ref 0.5–2.2)
LACTATE SERPL-SCNC: 1.9 MMOL/L (ref 0.5–2.2)
LEUKOCYTE ESTERASE UR QL STRIP: NEGATIVE
LYMPHOCYTES # BLD AUTO: 2.4 K/UL (ref 1–4.8)
LYMPHOCYTES NFR BLD: 13.1 % (ref 18–48)
MCH RBC QN AUTO: 27.9 PG (ref 27–31)
MCHC RBC AUTO-ENTMCNC: 30.7 G/DL (ref 32–36)
MCV RBC AUTO: 91 FL (ref 82–98)
METHADONE UR QL SCN>300 NG/ML: NEGATIVE
MICROSCOPIC COMMENT: NORMAL
MONOCYTES # BLD AUTO: 1.3 K/UL (ref 0.3–1)
MONOCYTES NFR BLD: 7.2 % (ref 4–15)
NEUTROPHILS # BLD AUTO: 14.4 K/UL (ref 1.8–7.7)
NEUTROPHILS NFR BLD: 77.7 % (ref 38–73)
NITRITE UR QL STRIP: NEGATIVE
NRBC BLD-RTO: 0 /100 WBC
OPIATES UR QL SCN: NEGATIVE
PCP UR QL SCN>25 NG/ML: NEGATIVE
PH UR STRIP: 5 [PH] (ref 5–8)
PLATELET # BLD AUTO: 623 K/UL (ref 150–350)
PMV BLD AUTO: 9 FL (ref 9.2–12.9)
POTASSIUM SERPL-SCNC: 4.4 MMOL/L (ref 3.5–5.1)
PROCALCITONIN SERPL IA-MCNC: 0.13 NG/ML
PROT SERPL-MCNC: 7.7 G/DL (ref 6–8.4)
PROT UR QL STRIP: NEGATIVE
RBC # BLD AUTO: 3.83 M/UL (ref 4.6–6.2)
RBC #/AREA URNS AUTO: 1 /HPF (ref 0–4)
SODIUM SERPL-SCNC: 135 MMOL/L (ref 136–145)
SP GR UR STRIP: 1.02 (ref 1–1.03)
TOXICOLOGY INFORMATION: NORMAL
URN SPEC COLLECT METH UR: ABNORMAL
WBC # BLD AUTO: 18.48 K/UL (ref 3.9–12.7)
WBC #/AREA URNS AUTO: 2 /HPF (ref 0–5)

## 2019-07-25 PROCEDURE — 99285 EMERGENCY DEPT VISIT HI MDM: CPT | Mod: 25

## 2019-07-25 PROCEDURE — 83605 ASSAY OF LACTIC ACID: CPT | Mod: 91

## 2019-07-25 PROCEDURE — 99285 PR EMERGENCY DEPT VISIT,LEVEL V: ICD-10-PCS | Mod: ,,, | Performed by: PHYSICIAN ASSISTANT

## 2019-07-25 PROCEDURE — G0378 HOSPITAL OBSERVATION PER HR: HCPCS

## 2019-07-25 PROCEDURE — 63600175 PHARM REV CODE 636 W HCPCS: Performed by: INTERNAL MEDICINE

## 2019-07-25 PROCEDURE — 96374 THER/PROPH/DIAG INJ IV PUSH: CPT

## 2019-07-25 PROCEDURE — 25000003 PHARM REV CODE 250: Performed by: INTERNAL MEDICINE

## 2019-07-25 PROCEDURE — 84145 PROCALCITONIN (PCT): CPT

## 2019-07-25 PROCEDURE — 25000003 PHARM REV CODE 250: Performed by: PHYSICIAN ASSISTANT

## 2019-07-25 PROCEDURE — 87040 BLOOD CULTURE FOR BACTERIA: CPT

## 2019-07-25 PROCEDURE — 11000001 HC ACUTE MED/SURG PRIVATE ROOM

## 2019-07-25 PROCEDURE — 93010 EKG 12-LEAD: ICD-10-PCS | Mod: ,,, | Performed by: INTERNAL MEDICINE

## 2019-07-25 PROCEDURE — 87186 SC STD MICRODIL/AGAR DIL: CPT | Mod: 59

## 2019-07-25 PROCEDURE — S4991 NICOTINE PATCH NONLEGEND: HCPCS | Performed by: PHYSICIAN ASSISTANT

## 2019-07-25 PROCEDURE — 93005 ELECTROCARDIOGRAM TRACING: CPT

## 2019-07-25 PROCEDURE — 80053 COMPREHEN METABOLIC PANEL: CPT

## 2019-07-25 PROCEDURE — 99285 EMERGENCY DEPT VISIT HI MDM: CPT | Mod: ,,, | Performed by: PHYSICIAN ASSISTANT

## 2019-07-25 PROCEDURE — 81001 URINALYSIS AUTO W/SCOPE: CPT

## 2019-07-25 PROCEDURE — 96361 HYDRATE IV INFUSION ADD-ON: CPT

## 2019-07-25 PROCEDURE — 80307 DRUG TEST PRSMV CHEM ANLYZR: CPT

## 2019-07-25 PROCEDURE — 99220 PR INITIAL OBSERVATION CARE,LEVL III: CPT | Mod: GC,,, | Performed by: INTERNAL MEDICINE

## 2019-07-25 PROCEDURE — 93010 ELECTROCARDIOGRAM REPORT: CPT | Mod: ,,, | Performed by: INTERNAL MEDICINE

## 2019-07-25 PROCEDURE — 96375 TX/PRO/DX INJ NEW DRUG ADDON: CPT

## 2019-07-25 PROCEDURE — 63600175 PHARM REV CODE 636 W HCPCS: Performed by: PHYSICIAN ASSISTANT

## 2019-07-25 PROCEDURE — 99220 PR INITIAL OBSERVATION CARE,LEVL III: ICD-10-PCS | Mod: GC,,, | Performed by: INTERNAL MEDICINE

## 2019-07-25 PROCEDURE — 25000003 PHARM REV CODE 250: Performed by: STUDENT IN AN ORGANIZED HEALTH CARE EDUCATION/TRAINING PROGRAM

## 2019-07-25 PROCEDURE — 85025 COMPLETE CBC W/AUTO DIFF WBC: CPT

## 2019-07-25 PROCEDURE — 87070 CULTURE OTHR SPECIMN AEROBIC: CPT

## 2019-07-25 PROCEDURE — 87077 CULTURE AEROBIC IDENTIFY: CPT

## 2019-07-25 RX ORDER — HYDROGEN PEROXIDE 3 %
20 SOLUTION, NON-ORAL MISCELLANEOUS
Status: ON HOLD | COMMUNITY
End: 2019-07-26 | Stop reason: HOSPADM

## 2019-07-25 RX ORDER — DEXAMETHASONE 4 MG/1
4 TABLET ORAL EVERY 6 HOURS
Status: DISCONTINUED | OUTPATIENT
Start: 2019-07-25 | End: 2019-07-25

## 2019-07-25 RX ORDER — IBUPROFEN 200 MG
1 TABLET ORAL DAILY
Status: DISCONTINUED | OUTPATIENT
Start: 2019-07-26 | End: 2019-07-26 | Stop reason: HOSPADM

## 2019-07-25 RX ORDER — CEFAZOLIN SODIUM 2 G/50ML
2 SOLUTION INTRAVENOUS
Status: DISCONTINUED | OUTPATIENT
Start: 2019-07-25 | End: 2019-07-25

## 2019-07-25 RX ORDER — CEFAZOLIN SODIUM 1 G/3ML
1 INJECTION, POWDER, FOR SOLUTION INTRAMUSCULAR; INTRAVENOUS
Status: DISCONTINUED | OUTPATIENT
Start: 2019-07-25 | End: 2019-07-25

## 2019-07-25 RX ORDER — FAMOTIDINE 20 MG/1
20 TABLET, FILM COATED ORAL 2 TIMES DAILY
Status: DISCONTINUED | OUTPATIENT
Start: 2019-07-25 | End: 2019-07-26 | Stop reason: HOSPADM

## 2019-07-25 RX ORDER — TRAMADOL HYDROCHLORIDE 50 MG/1
50 TABLET ORAL EVERY 6 HOURS PRN
Status: DISCONTINUED | OUTPATIENT
Start: 2019-07-25 | End: 2019-07-25

## 2019-07-25 RX ORDER — MORPHINE SULFATE 15 MG/1
15 TABLET ORAL EVERY 6 HOURS PRN
Status: DISCONTINUED | OUTPATIENT
Start: 2019-07-25 | End: 2019-07-26 | Stop reason: HOSPADM

## 2019-07-25 RX ORDER — TRAMADOL HYDROCHLORIDE 50 MG/1
50 TABLET ORAL EVERY 6 HOURS SCHEDULED
Status: DISCONTINUED | OUTPATIENT
Start: 2019-07-25 | End: 2019-07-26 | Stop reason: HOSPADM

## 2019-07-25 RX ORDER — CEFAZOLIN SODIUM 1 G/3ML
1 INJECTION, POWDER, FOR SOLUTION INTRAMUSCULAR; INTRAVENOUS
Status: COMPLETED | OUTPATIENT
Start: 2019-07-25 | End: 2019-07-25

## 2019-07-25 RX ORDER — DOXYCYCLINE HYCLATE 100 MG
100 TABLET ORAL EVERY 12 HOURS
Status: DISCONTINUED | OUTPATIENT
Start: 2019-07-25 | End: 2019-07-25

## 2019-07-25 RX ORDER — MIDAZOLAM HYDROCHLORIDE 1 MG/ML
4 INJECTION INTRAMUSCULAR; INTRAVENOUS
Status: DISCONTINUED | OUTPATIENT
Start: 2019-07-25 | End: 2019-07-25

## 2019-07-25 RX ORDER — MIDAZOLAM HYDROCHLORIDE 1 MG/ML
4 INJECTION INTRAMUSCULAR; INTRAVENOUS ONCE AS NEEDED
Status: COMPLETED | OUTPATIENT
Start: 2019-07-25 | End: 2019-07-25

## 2019-07-25 RX ORDER — SULFAMETHOXAZOLE AND TRIMETHOPRIM 800; 160 MG/1; MG/1
2 TABLET ORAL 2 TIMES DAILY
Status: DISCONTINUED | OUTPATIENT
Start: 2019-07-25 | End: 2019-07-26 | Stop reason: HOSPADM

## 2019-07-25 RX ORDER — MIDAZOLAM HYDROCHLORIDE 1 MG/ML
INJECTION INTRAMUSCULAR; INTRAVENOUS
Status: DISCONTINUED
Start: 2019-07-25 | End: 2019-07-25 | Stop reason: WASHOUT

## 2019-07-25 RX ORDER — DIAZEPAM 5 MG/1
5 TABLET ORAL EVERY 8 HOURS
Status: DISCONTINUED | OUTPATIENT
Start: 2019-07-25 | End: 2019-07-26

## 2019-07-25 RX ORDER — IBUPROFEN 200 MG
1 TABLET ORAL
Status: COMPLETED | OUTPATIENT
Start: 2019-07-25 | End: 2019-07-26

## 2019-07-25 RX ORDER — SODIUM CHLORIDE 0.9 % (FLUSH) 0.9 %
10 SYRINGE (ML) INJECTION
Status: DISCONTINUED | OUTPATIENT
Start: 2019-07-25 | End: 2019-07-26 | Stop reason: HOSPADM

## 2019-07-25 RX ORDER — DEXAMETHASONE SODIUM PHOSPHATE 4 MG/ML
4 INJECTION, SOLUTION INTRA-ARTICULAR; INTRALESIONAL; INTRAMUSCULAR; INTRAVENOUS; SOFT TISSUE EVERY 6 HOURS
Status: DISCONTINUED | OUTPATIENT
Start: 2019-07-25 | End: 2019-07-25

## 2019-07-25 RX ORDER — ACETAMINOPHEN 500 MG
1000 TABLET ORAL 3 TIMES DAILY
Status: DISCONTINUED | OUTPATIENT
Start: 2019-07-25 | End: 2019-07-26 | Stop reason: HOSPADM

## 2019-07-25 RX ORDER — VANCOMYCIN 2 GRAM/500 ML IN 0.9 % SODIUM CHLORIDE INTRAVENOUS
2000
Status: COMPLETED | OUTPATIENT
Start: 2019-07-25 | End: 2019-07-25

## 2019-07-25 RX ADMIN — NICOTINE 1 PATCH: 21 PATCH, EXTENDED RELEASE TRANSDERMAL at 12:07

## 2019-07-25 RX ADMIN — FAMOTIDINE 20 MG: 20 TABLET, FILM COATED ORAL at 08:07

## 2019-07-25 RX ADMIN — ACETAMINOPHEN 1000 MG: 500 TABLET ORAL at 02:07

## 2019-07-25 RX ADMIN — VANCOMYCIN HYDROCHLORIDE 2000 MG: 100 INJECTION, POWDER, LYOPHILIZED, FOR SOLUTION INTRAVENOUS at 12:07

## 2019-07-25 RX ADMIN — SULFAMETHOXAZOLE AND TRIMETHOPRIM 2 TABLET: 800; 160 TABLET ORAL at 03:07

## 2019-07-25 RX ADMIN — SODIUM CHLORIDE 1632 ML: 0.9 INJECTION, SOLUTION INTRAVENOUS at 10:07

## 2019-07-25 RX ADMIN — CEFAZOLIN 1 G: 1 INJECTION, POWDER, FOR SOLUTION INTRAMUSCULAR; INTRAVENOUS at 10:07

## 2019-07-25 RX ADMIN — DEXAMETHASONE 6 MG: 4 TABLET ORAL at 03:07

## 2019-07-25 RX ADMIN — MIDAZOLAM HYDROCHLORIDE 4 MG: 1 INJECTION, SOLUTION INTRAMUSCULAR; INTRAVENOUS at 11:07

## 2019-07-25 RX ADMIN — SULFAMETHOXAZOLE AND TRIMETHOPRIM 2 TABLET: 800; 160 TABLET ORAL at 08:07

## 2019-07-25 RX ADMIN — TRAMADOL HYDROCHLORIDE 50 MG: 50 TABLET, FILM COATED ORAL at 06:07

## 2019-07-25 RX ADMIN — DIAZEPAM 5 MG: 5 TABLET ORAL at 03:07

## 2019-07-25 RX ADMIN — MORPHINE SULFATE 15 MG: 15 TABLET ORAL at 08:07

## 2019-07-25 RX ADMIN — DIAZEPAM 5 MG: 5 TABLET ORAL at 10:07

## 2019-07-25 RX ADMIN — ACETAMINOPHEN 1000 MG: 500 TABLET ORAL at 08:07

## 2019-07-25 NOTE — SUBJECTIVE & OBJECTIVE
Past Medical History:   Diagnosis Date    Abscess, prevertebral soft tissue of neck 7/17/2019 7/16/2019 CT neck done at  Large fluid collection with a few foci of gas centered in the prevertebral soft tissues anterior to the C3-C7 levels, highly suspicious for prevertebral abscess.  The larynx is displaced anteriorly. Anterior fusion of C5-C6. Findings were discussed with Ramiro Carrasco MD on 7/16/2019 at 1530 hours.  Additional contrast enhanced CT images of the neck were recommended.    Anxiety     Cervical radiculopathy     Chronic neck pain     Hypertension     Lumbar radiculopathy 7/2016 MRI with degeneration, mild central stenosis L3-4 2/8/2019 7/7/16 MRI L spine IMPRESSION:  1.  Mild discogenic predominant degenerative changes of the lower lumbar spine with mild central canal stenosis at L3-4. 2. Annular fissures at L3-4 and L4-5.     Neuromuscular disorder     Nodule of left lobe of thyroid gland incidental on CT 7/2019 8 mm rec's thyroid US 7/18/2019    Opioid use disorder 2/8/2019       Past Surgical History:   Procedure Laterality Date    cervicle fusion      EPIDURAL BLOCK INJECTION      lumbar x 2    INCISION AND DRAINAGE, ABSCESS, prevertebral Right 7/19/2019    Performed by Kole Cornelius MD at [facility] OR [address] [place] [address]       Review of patient's allergies indicates:   Allergen Reactions    Vicodin [hydrocodone-acetaminophen] Hives       No current facility-administered medications on file prior to encounter.      Current Outpatient Medications on File Prior to Encounter   Medication Sig    esomeprazole (NEXIUM) 20 MG capsule Take 20 mg by mouth before breakfast.    gabapentin (NEURONTIN) 800 MG tablet Take 1 tablet (800 mg total) by mouth 3 (three) times daily as needed.    [DISCONTINUED] methylPREDNISolone (MEDROL DOSEPACK) 4 mg tablet use as directed     Family History     Problem Relation (Age of Onset)    No Known Problems Mother, Father, Sister, Sister        Tobacco Use     Smoking status: Current Every Day Smoker     Packs/day: 1.00     Types: Cigarettes    Smokeless tobacco: Former User   Substance and Sexual Activity    Alcohol use: No    Drug use: Yes     Types: IV     Comment: pain pills and IV Heroin this am    Sexual activity: Yes     Partners: Female     Review of Systems   Constitutional: Negative for appetite change, chills and fever.   HENT: Negative for dental problem and trouble swallowing.    Eyes: Negative for visual disturbance.   Respiratory: Negative for cough, chest tightness and shortness of breath.    Cardiovascular: Negative for chest pain and palpitations.   Gastrointestinal: Negative for abdominal pain, diarrhea, nausea and vomiting.   Genitourinary: Negative for flank pain.   Musculoskeletal: Positive for neck pain. Negative for back pain.   Skin:        Surgical incision and drain in L neck   Neurological: Negative for dizziness, weakness, numbness and headaches.   Psychiatric/Behavioral: Negative for suicidal ideas. The patient is nervous/anxious.      Objective:     Vital Signs (Most Recent):  Temp: 98.7 °F (37.1 °C) (07/25/19 1222)  Pulse: 64 (07/25/19 1222)  Resp: 16 (07/25/19 1222)  BP: 127/83 (07/25/19 1222)  SpO2: 100 % (07/25/19 0819) Vital Signs (24h Range):  Temp:  [98 °F (36.7 °C)-98.7 °F (37.1 °C)] 98.7 °F (37.1 °C)  Pulse:  [64-90] 64  Resp:  [16-18] 16  SpO2:  [100 %] 100 %  BP: (127-141)/(80-83) 127/83     Weight: 54.4 kg (120 lb)  Body mass index is 17.72 kg/m².    Physical Exam   Constitutional: He is oriented to person, place, and time. He appears well-developed and well-nourished. No distress.   HENT:   Head: Normocephalic and atraumatic.   Eyes: Conjunctivae are normal.   Neck: Normal range of motion. Neck supple.   Cardiovascular: Normal rate and regular rhythm.   Pulmonary/Chest: Effort normal and breath sounds normal. No respiratory distress. He has no wheezes.   Abdominal: Soft. Bowel sounds are normal. There is no tenderness.    Musculoskeletal: Normal range of motion.   L trapezius muscle tension and spasticity.   Surgical incision to L mediolateral neck with drain in place with scant drainage. Sutures are intact, no signs of infection.    Neurological: He is alert and oriented to person, place, and time.   Skin: Skin is warm and dry. He is not diaphoretic.   Psychiatric: His mood appears anxious.   Restless.   Nursing note and vitals reviewed.          Significant Labs: All pertinent labs within the past 24 hours have been reviewed.    Significant Imaging: I have reviewed all pertinent imaging results/findings within the past 24 hours.

## 2019-07-25 NOTE — ED NOTES
Telemetry Verification   Patient placed on Telemetry Box  Verified with War Room  Box #    Monitor Tech    Rate NSR   Rhythm 82

## 2019-07-25 NOTE — ASSESSMENT & PLAN NOTE
MRI : Retropharyngeal collection/abscess not seen on today's study due to the placement of a saturation band over this location.  The collection seen flattening the anterior thecal sac at C6-C7 and just below that this plan level appears to be fluid in nature possibly hematoma seroma.  An abscess collection cannot be excluded.    Plan:  -- ENT consulted  -- Bactrim BID  -- Tylenol 1g, dexamethasone 6 mg q6hr, Diazepam 5 mg q8, tramadol 50 mg QID schedule for pain with morphine for break through pain.

## 2019-07-25 NOTE — PROGRESS NOTES
Pt arrived from ED with Linda MONGE / removed Tele and placed to MRI monitor / IV to HL pt moved to bed w/ NAD but did C/O neck pain / upon laying in MRI pt began screaming and stating he was in too much pain and could not remain in MRI / pt pulled out and sitting on table / called linda MONGE in ED and informed of pt condition /. Pt on monitor in MRI

## 2019-07-25 NOTE — PROGRESS NOTES
Notified by transport that patient arrived to 660.  Patient not in room on staff arrival to admit.

## 2019-07-25 NOTE — ED NOTES
Patient identifiers verified and correct for Mr Thomas  C/C: neck pain , recheck wound SEE NN  APPEARANCE: awake and alert, emotional, crying at intervals.  SKIN: warm, dressing to right frontal neck with drain in place, minimal yellow drainage on bandage, mult small red marks on arms and legs  MUSCULOSKELETAL: Patient moving all extremities spontaneously, no obvious swelling or deformities noted. Ambulates independently.  RESPIRATORY: Denies shortness of breath.Respirations unlabored.   CARDIAC: Denies CP, 2+ distal pulses; no peripheral edema  ABDOMEN: S/ND/NT, Positive nausea  : voids spontaneously, denies difficulty  Neurologic: AAO x 4; follows commands equal strength in all extremities; denies numbness/tingling. Denies dizziness Denies weakness

## 2019-07-25 NOTE — CONSULTS
[facility]  Otorhinolaryngology-Head & Neck Surgery  Consult Note    Patient Name: Marcial Thomas  MRN: [id number]  Code Status: Prior  Admission Date: 7/25/2019  Hospital Length of Stay: 0 days  Attending Physician: Malik Walker MD  Primary Care Provider: Javad Méndez MD    Patient information was obtained from patient and ER records.     Inpatient consult to ENT  Consult performed by: Laly Mendez MD  Consult ordered by: Jannette Ramon PA-C        Subjective:     Chief Complaint/Reason for Admission: s/p I&D of retropharyngeal/prevertebral abscess    HPI:   38-year-old male w/PMH of cervical fusion and IVDU. He was recently hospitalized 7/17/19-7/20/19 for retropharyngeal/prevertebral abscess. An I&D was performed on 7/19/19. However patient left AMA with right neck drain in place. At time of prior hospitalization, blood cultures grew MSSA treated with IV vancomycin and cefazolin, but he reports he has not been on antibiotics since his discharge.     Today he returns to the ER for persistent cervical neck pain and left paresthesias/numbness since he left the hospital.  He reports a constant sharp pain throughout his neck and bilateral shoulders. He reports with onset of pain he relapsed and began using IV heroin and other opioids to help with his pain. He has also been taking aspirin, ibuprofen, and tylenol every 2 hours for additional pain relief. He denies fevers or chills. He denies significant drainage from drain.     Medications:  Continuous Infusions:  Scheduled Meds:   acetaminophen  1,000 mg Oral TID    dexAMETHasone  6 mg Oral Q6H    diazePAM  5 mg Oral Q8H    famotidine  20 mg Oral BID    nicotine  1 patch Transdermal ED 1 Time    [START ON 7/26/2019] nicotine  1 patch Transdermal Daily    sulfamethoxazole-trimethoprim 800-160mg  2 tablet Oral BID    traMADol  50 mg Oral 4 times per day     PRN Meds:morphine, sodium chloride 0.9%     No current  facility-administered medications on file prior to encounter.      Current Outpatient Medications on File Prior to Encounter   Medication Sig    esomeprazole (NEXIUM) 20 MG capsule Take 20 mg by mouth before breakfast.    gabapentin (NEURONTIN) 800 MG tablet Take 1 tablet (800 mg total) by mouth 3 (three) times daily as needed.    [DISCONTINUED] methylPREDNISolone (MEDROL DOSEPACK) 4 mg tablet use as directed       Review of patient's allergies indicates:   Allergen Reactions    Vicodin [hydrocodone-acetaminophen] Hives       Past Medical History:   Diagnosis Date    Abscess, prevertebral soft tissue of neck 7/17/2019 7/16/2019 CT neck done at  Large fluid collection with a few foci of gas centered in the prevertebral soft tissues anterior to the C3-C7 levels, highly suspicious for prevertebral abscess.  The larynx is displaced anteriorly. Anterior fusion of C5-C6. Findings were discussed with Ramiro Carrasco MD on 7/16/2019 at 1530 hours.  Additional contrast enhanced CT images of the neck were recommended.    Anxiety     Cervical radiculopathy     Chronic neck pain     Hypertension     Lumbar radiculopathy 7/2016 MRI with degeneration, mild central stenosis L3-4 2/8/2019 7/7/16 MRI L spine IMPRESSION:  1.  Mild discogenic predominant degenerative changes of the lower lumbar spine with mild central canal stenosis at L3-4. 2. Annular fissures at L3-4 and L4-5.     Neuromuscular disorder     Nodule of left lobe of thyroid gland incidental on CT 7/2019 8 mm rec's thyroid US 7/18/2019    Opioid use disorder 2/8/2019     Past Surgical History:   Procedure Laterality Date    cervicle fusion      EPIDURAL BLOCK INJECTION      lumbar x 2    INCISION AND DRAINAGE, ABSCESS, prevertebral Right 7/19/2019    Performed by Kole Cornelius MD at [facility] OR [address] [place] [address]     Family History     Problem Relation (Age of Onset)    No Known Problems Mother, Father, Sister, Sister        Tobacco Use    Smoking  status: Current Every Day Smoker     Packs/day: 1.00     Types: Cigarettes    Smokeless tobacco: Former User   Substance and Sexual Activity    Alcohol use: No    Drug use: Yes     Types: IV     Comment: pain pills and IV Heroin this am    Sexual activity: Yes     Partners: Female     Review of Systems   Constitutional: Negative for appetite change, chills, fatigue and fever.   HENT: Negative for facial swelling, sore throat and voice change.    Respiratory: Negative for shortness of breath and wheezing.    Musculoskeletal: Positive for back pain and neck pain.   Neurological: Positive for numbness.          Objective:     Vital Signs (Most Recent):  Temp: 98.3 °F (36.8 °C) (07/25/19 1635)  Pulse: 84 (07/25/19 1635)  Resp: 18 (07/25/19 1635)  BP: (!) 163/102 (07/25/19 1635)  SpO2: 98 % (07/25/19 1635) Vital Signs (24h Range):  Temp:  [98 °F (36.7 °C)-98.7 °F (37.1 °C)] 98.3 °F (36.8 °C)  Pulse:  [64-90] 84  Resp:  [16-18] 18  SpO2:  [98 %-100 %] 98 %  BP: (127-163)/() 163/102     Weight: 54.4 kg (120 lb)  Body mass index is 17.72 kg/m².    Date 07/25/19 0700 - 07/26/19 0659   Shift 2496-2725 8459-7803 [id number] 24 Hour Total   INTAKE   IV Piggyback 500 1632  2132   Shift Total(mL/kg) 500(9.2) 1632(30)  2132(39.2)   OUTPUT   Shift Total(mL/kg)       Weight (kg) 54.4 54.4 54.4 54.4       Physical Exam  Appearance: Awake, alert and oriented. In some pain but no distress.  Eyes: Pupils equal, round and reactive. Extraocular muscles intact. Vision grossly intact.  Nose: External appearance normal. No evidence of septal deviation. Inferior turbinates within normal limits  Ears:  Right: External appearance normal. External auditory canal within normal limits. Tympanic membrane translucent. No evidence of infection or effusion.  Left: External appearance normal. External auditory canal within normal limits. Tympanic membrane translucent. No evidence of infection or effusion.  Mouth: Mucosal membranes moist. Tongue  mobile. Oropharynx clear and patent.  Neck: R sided penrose in place with minimal serosanguinous output. No anterior or posterior cervical lymphadenopathy.  Respiratory: Normal work of breathing. No stridor or stertor      Significant Labs:  CBC:   Recent Labs   Lab 07/25/19  0857   WBC 18.48*   RBC 3.83*   HGB 10.7*   HCT 34.8*   *   MCV 91   MCH 27.9   MCHC 30.7*       Significant Diagnostics:  MRI: I have reviewed all pertinent results/findings within the past 24 hours and the reading was:      Retropharyngeal collection/abscess not seen on today's study due to the placement of a saturation band over this location.    The collection seen flattening the anterior thecal sac at C6-C7 and just below that this plan level appears to be fluid in nature possibly hematoma seroma.  An abscess collection cannot be excluded.    Assessment/Plan:     * Retropharyngeal abscess  38-year-old male s/p I&D on 7/19 for retropharyngeal/prevertebral abscess, left AMA on 7/20 without completing antibiotic course and penrose in place. Now with recurrent neck pain.    -Care per primary team  -Likely will d/c penrose, will discuss   -MRI does not appear to show recurrent abscess formation, likely post-operative changes   -ENT will follow, call/page with questions       VTE Risk Mitigation (From admission, onward)        Ordered     IP VTE HIGH RISK PATIENT  Once      07/25/19 1605     Place sequential compression device  Until discontinued      07/25/19 1605          Laly Mendez MD  Otorhinolaryngology-Head & Neck Surgery  [facility]

## 2019-07-25 NOTE — ASSESSMENT & PLAN NOTE
Culture pos for MSSA.  Difficulty obtaining IV access and pt is a flight risk, PO abx management is most appropriate.     Plan:  -- Sulfamethoxazole / Trimethoprim 800-160 mg BID

## 2019-07-25 NOTE — ED TRIAGE NOTES
"Patient states he had abscess drained Friday, states "draining like crazy" states he left hospital due to an "anxiety attack" family states drainage yellow/white, left without antibiotics since 7/20, States pain to right neck x1 month. States he was in so much pain he was unable to return to ED. Denies fevers.   "

## 2019-07-25 NOTE — H&P
"[facility] Hospital Medicine  History & Physical    Patient Name: Marcial Thomas  MRN: [id number]  Admission Date: 7/25/2019  Attending Physician: Malik Walker MD   Primary Care Provider: Javad Méndez MD    [facility] Medicine Team: [facility] MED 3 Amber Jones MD     Patient information was obtained from patient and ER records.     Subjective:     Principal Problem:Retropharyngeal abscess    Chief Complaint:   Chief Complaint   Patient presents with    Abscess     Patient states left neck abscess drained in ED Saturday with continued pain to left neck across shoulder and down arm, states draining " alot" crying during interview, left without antibiotics    Neck Pain        HPI: 38-year-old male with PMHx IVDU and recently drained pre-vertebral abscess bounce back to the inpatient service after he eloped on 7/20. He still has a drain in place and has not been on any abx since discharge. He reports worsening of cervical neck pain.  He reports a constant 10/10 sharp neck and bilateral shoulders pain. He has been taking aspirin, ibuprofen, and tylenol every 2 hours with minimal relief. He has used IV heroin yesterday and opioids he bought off the street for pain control.  He reports feeling generally warm, but denies fevers or chills. Denies chest pain, SOB, abdominal pain, n/v/d, blood in stool, melena, urinary symptoms, confusion, weakness, congestion, or any other medical complaints.      Work up in the ED: CXR, EKG and Urine are neg. Leukocytosis at 18.   MRI showed: Retropharyngeal collection/abscess not seen on today's study due to the placement of a saturation band over this location.  The collection seen flattening the anterior thecal sac at C6-C7 and just below that this plan level appears to be fluid in nature possibly hematoma seroma.  An abscess collection cannot be excluded.    Past Medical History:   Diagnosis Date    Abscess, prevertebral soft tissue of neck 7/17/2019    " 7/16/2019 CT neck done at  Large fluid collection with a few foci of gas centered in the prevertebral soft tissues anterior to the C3-C7 levels, highly suspicious for prevertebral abscess.  The larynx is displaced anteriorly. Anterior fusion of C5-C6. Findings were discussed with Ramiro Carrasco MD on 7/16/2019 at 1530 hours.  Additional contrast enhanced CT images of the neck were recommended.    Anxiety     Cervical radiculopathy     Chronic neck pain     Hypertension     Lumbar radiculopathy 7/2016 MRI with degeneration, mild central stenosis L3-4 2/8/2019 7/7/16 MRI L spine IMPRESSION:  1.  Mild discogenic predominant degenerative changes of the lower lumbar spine with mild central canal stenosis at L3-4. 2. Annular fissures at L3-4 and L4-5.     Neuromuscular disorder     Nodule of left lobe of thyroid gland incidental on CT 7/2019 8 mm rec's thyroid US 7/18/2019    Opioid use disorder 2/8/2019       Past Surgical History:   Procedure Laterality Date    cervicle fusion      EPIDURAL BLOCK INJECTION      lumbar x 2    INCISION AND DRAINAGE, ABSCESS, prevertebral Right 7/19/2019    Performed by [facility]ole Cornelius MD at [facility] OR [address] [place] [address]       Review of patient's allergies indicates:   Allergen Reactions    Vicodin [hydrocodone-acetaminophen] Hives       No current facility-administered medications on file prior to encounter.      Current Outpatient Medications on File Prior to Encounter   Medication Sig    esomeprazole (NEXIUM) 20 MG capsule Take 20 mg by mouth before breakfast.    gabapentin (NEURONTIN) 800 MG tablet Take 1 tablet (800 mg total) by mouth 3 (three) times daily as needed.    [DISCONTINUED] methylPREDNISolone (MEDROL DOSEPACK) 4 mg tablet use as directed     Family History     Problem Relation (Age of Onset)    No Known Problems Mother, Father, Sister, Sister        Tobacco Use    Smoking status: Current Every Day Smoker     Packs/day: 1.00     Types: Cigarettes    Smokeless  tobacco: Former User   Substance and Sexual Activity    Alcohol use: No    Drug use: Yes     Types: IV     Comment: pain pills and IV Heroin this am    Sexual activity: Yes     Partners: Female     Review of Systems   Constitutional: Negative for appetite change, chills and fever.   HENT: Negative for dental problem and trouble swallowing.    Eyes: Negative for visual disturbance.   Respiratory: Negative for cough, chest tightness and shortness of breath.    Cardiovascular: Negative for chest pain and palpitations.   Gastrointestinal: Negative for abdominal pain, diarrhea, nausea and vomiting.   Genitourinary: Negative for flank pain.   Musculoskeletal: Positive for neck pain. Negative for back pain.   Skin:        Surgical incision and drain in L neck   Neurological: Negative for dizziness, weakness, numbness and headaches.   Psychiatric/Behavioral: Negative for suicidal ideas. The patient is nervous/anxious.      Objective:     Vital Signs (Most Recent):  Temp: 98.7 °F (37.1 °C) (07/25/19 1222)  Pulse: 64 (07/25/19 1222)  Resp: 16 (07/25/19 1222)  BP: 127/83 (07/25/19 1222)  SpO2: 100 % (07/25/19 0819) Vital Signs (24h Range):  Temp:  [98 °F (36.7 °C)-98.7 °F (37.1 °C)] 98.7 °F (37.1 °C)  Pulse:  [64-90] 64  Resp:  [16-18] 16  SpO2:  [100 %] 100 %  BP: (127-141)/(80-83) 127/83     Weight: 54.4 kg (120 lb)  Body mass index is 17.72 kg/m².    Physical Exam   Constitutional: He is oriented to person, place, and time. He appears well-developed and well-nourished. No distress.   HENT:   Head: Normocephalic and atraumatic.   Eyes: Conjunctivae are normal.   Neck: Normal range of motion. Neck supple.   Cardiovascular: Normal rate and regular rhythm.   Pulmonary/Chest: Effort normal and breath sounds normal. No respiratory distress. He has no wheezes.   Abdominal: Soft. Bowel sounds are normal. There is no tenderness.   Musculoskeletal: Normal range of motion.   L trapezius muscle tension and spasticity.   Surgical  incision to L mediolateral neck with drain in place with scant drainage. Sutures are intact, no signs of infection.    Neurological: He is alert and oriented to person, place, and time.   Skin: Skin is warm and dry. He is not diaphoretic.   Psychiatric: His mood appears anxious.   Restless.   Nursing note and vitals reviewed.          Significant Labs: All pertinent labs within the past 24 hours have been reviewed.    Significant Imaging: I have reviewed all pertinent imaging results/findings within the past 24 hours.    Assessment/Plan:     * Retropharyngeal abscess  MRI : Retropharyngeal collection/abscess not seen on today's study due to the placement of a saturation band over this location.  The collection seen flattening the anterior thecal sac at C6-C7 and just below that this plan level appears to be fluid in nature possibly hematoma seroma.  An abscess collection cannot be excluded.    Plan:  -- ENT consulted  -- Bactrim BID  -- Tylenol 1g, dexamethasone 6 mg q6hr, Diazepam 5 mg q8, tramadol 50 mg QID schedule for pain with morphine for break through pain.     MSSA (methicillin susceptible Staphylococcus aureus) infection  Culture pos for MSSA.  Difficulty obtaining IV access and pt is a flight risk, PO abx management is most appropriate.     Plan:  -- Sulfamethoxazole / Trimethoprim 800-160 mg BID          Neck pain  Pt has been using IV heroin and opioid for pain control.     Plan:  -- Tylenol 1g, dexamethasone 6 mg q6hr, Diazepam 5 mg q8, tramadol 50 mg QID schedule for pain  -- morphine 15 mg PO prn for break through pain.       Nicotine dependence, cigarettes, uncomplicated  Plan:  -- nicotine patch       History of fusion of cervical spine 2006        Essential hypertension          VTE Risk Mitigation (From admission, onward)        Ordered     IP VTE HIGH RISK PATIENT  Once      07/25/19 1605     Place sequential compression device  Until discontinued      07/25/19 1605             Amber Jones  MD  Department of Hospital Medicine   [facility]

## 2019-07-25 NOTE — ASSESSMENT & PLAN NOTE
Pt has been using IV heroin and opioid for pain control.     Plan:  -- Tylenol 1g, dexamethasone 6 mg q6hr, Diazepam 5 mg q8, tramadol 50 mg QID schedule for pain  -- morphine 15 mg PO prn for break through pain.
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
## 2019-07-25 NOTE — HOSPITAL COURSE
Pt is a bounce back to IM 3. He has a pre-vertebral abscess drained by ENT and on IV abx when he eloped on 7/20 with a drain still in place in his neck. He has not been on any abx since he left and returns c/o worsening neck pain. Fluid collection seen on MRI, ENT has evaluated pt and they do not plan any interventions at this time. It is difficult to obtain IV access and he is a flight risk, he was placed on oral abx and pain regiment. Pt compliant oral abx regiment. Labs and imagining indicate that treatment regiment appropriate and he was HD stable. He was discharged home with close outpt f/u.
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
## 2019-07-25 NOTE — ED NOTES
Patient states Iv Heroin and non rx opiate use this am. Min redness noted to site with drain in place.

## 2019-07-25 NOTE — HPI
38-year-old male with PMHx IVDU and recently drained pre-vertebral abscess bounce back to the inpatient service after he eloped on 7/20. He still has a drain in place and has not been on any abx since discharge. He reports worsening of cervical neck pain.  He reports a constant 10/10 sharp neck and bilateral shoulders pain. He has been taking aspirin, ibuprofen, and tylenol every 2 hours with minimal relief. He has used IV heroin yesterday and opioids he bought off the street for pain control.  He reports feeling generally warm, but denies fevers or chills. Denies chest pain, SOB, abdominal pain, n/v/d, blood in stool, melena, urinary symptoms, confusion, weakness, congestion, or any other medical complaints.      Work up in the ED: CXR, EKG and Urine are neg. Leukocytosis at 18.   MRI showed: Retropharyngeal collection/abscess not seen on today's study due to the placement of a saturation band over this location.  The collection seen flattening the anterior thecal sac at C6-C7 and just below that this plan level appears to be fluid in nature possibly hematoma seroma.  An abscess collection cannot be excluded.

## 2019-07-25 NOTE — ASSESSMENT & PLAN NOTE
38-year-old male s/p I&D on 7/19 for retropharyngeal/prevertebral abscess, left AMA on 7/20 without completing antibiotic course and penrose in place. Now with recurrent neck pain.    -Care per primary team  -Likely will d/c penrose, will discuss   -MRI does not appear to show recurrent abscess formation, likely post-operative changes   -ENT will follow, call/page with questions

## 2019-07-26 ENCOUNTER — TELEPHONE (OUTPATIENT)
Dept: INTERNAL MEDICINE | Facility: CLINIC | Age: 39
End: 2019-07-26

## 2019-07-26 VITALS
RESPIRATION RATE: 20 BRPM | SYSTOLIC BLOOD PRESSURE: 144 MMHG | WEIGHT: 122.81 LBS | DIASTOLIC BLOOD PRESSURE: 91 MMHG | OXYGEN SATURATION: 95 % | BODY MASS INDEX: 18.19 KG/M2 | HEART RATE: 84 BPM | TEMPERATURE: 98 F | HEIGHT: 69 IN

## 2019-07-26 DIAGNOSIS — Z98.1 HISTORY OF FUSION OF CERVICAL SPINE: ICD-10-CM

## 2019-07-26 DIAGNOSIS — J39.0 RETROPHARYNGEAL ABSCESS: ICD-10-CM

## 2019-07-26 DIAGNOSIS — A49.01 MSSA (METHICILLIN SUSCEPTIBLE STAPHYLOCOCCUS AUREUS) INFECTION: Primary | ICD-10-CM

## 2019-07-26 LAB
ALBUMIN SERPL BCP-MCNC: 2.8 G/DL (ref 3.5–5.2)
ALP SERPL-CCNC: 88 U/L (ref 55–135)
ALT SERPL W/O P-5'-P-CCNC: 14 U/L (ref 10–44)
ANION GAP SERPL CALC-SCNC: 16 MMOL/L (ref 8–16)
AST SERPL-CCNC: 27 U/L (ref 10–40)
BILIRUB SERPL-MCNC: 0.1 MG/DL (ref 0.1–1)
BUN SERPL-MCNC: 9 MG/DL (ref 6–20)
CALCIUM SERPL-MCNC: 9.4 MG/DL (ref 8.7–10.5)
CHLORIDE SERPL-SCNC: 104 MMOL/L (ref 95–110)
CO2 SERPL-SCNC: 17 MMOL/L (ref 23–29)
CREAT SERPL-MCNC: 0.8 MG/DL (ref 0.5–1.4)
CRP SERPL-MCNC: 56.8 MG/L (ref 0–8.2)
EST. GFR  (AFRICAN AMERICAN): >60 ML/MIN/1.73 M^2
EST. GFR  (NON AFRICAN AMERICAN): >60 ML/MIN/1.73 M^2
GLUCOSE SERPL-MCNC: 195 MG/DL (ref 70–110)
POTASSIUM SERPL-SCNC: 6.4 MMOL/L (ref 3.5–5.1)
PROT SERPL-MCNC: 7.8 G/DL (ref 6–8.4)
SODIUM SERPL-SCNC: 137 MMOL/L (ref 136–145)

## 2019-07-26 PROCEDURE — 25000003 PHARM REV CODE 250: Performed by: STUDENT IN AN ORGANIZED HEALTH CARE EDUCATION/TRAINING PROGRAM

## 2019-07-26 PROCEDURE — 93010 EKG 12-LEAD: ICD-10-PCS | Mod: ,,, | Performed by: INTERNAL MEDICINE

## 2019-07-26 PROCEDURE — 93010 ELECTROCARDIOGRAM REPORT: CPT | Mod: ,,, | Performed by: INTERNAL MEDICINE

## 2019-07-26 PROCEDURE — 86140 C-REACTIVE PROTEIN: CPT

## 2019-07-26 PROCEDURE — 93005 ELECTROCARDIOGRAM TRACING: CPT

## 2019-07-26 PROCEDURE — 25000003 PHARM REV CODE 250: Performed by: INTERNAL MEDICINE

## 2019-07-26 PROCEDURE — 63600175 PHARM REV CODE 636 W HCPCS: Performed by: INTERNAL MEDICINE

## 2019-07-26 PROCEDURE — 99217 PR OBSERVATION CARE DISCHARGE: CPT | Mod: GC,,, | Performed by: INTERNAL MEDICINE

## 2019-07-26 PROCEDURE — 99217 PR OBSERVATION CARE DISCHARGE: ICD-10-PCS | Mod: GC,,, | Performed by: INTERNAL MEDICINE

## 2019-07-26 PROCEDURE — 80053 COMPREHEN METABOLIC PANEL: CPT

## 2019-07-26 PROCEDURE — S4991 NICOTINE PATCH NONLEGEND: HCPCS | Performed by: INTERNAL MEDICINE

## 2019-07-26 PROCEDURE — G0378 HOSPITAL OBSERVATION PER HR: HCPCS

## 2019-07-26 PROCEDURE — 36415 COLL VENOUS BLD VENIPUNCTURE: CPT

## 2019-07-26 RX ORDER — ACETAMINOPHEN 500 MG
1000 TABLET ORAL DAILY PRN
Refills: 0 | COMMUNITY
Start: 2019-07-26

## 2019-07-26 RX ORDER — SULFAMETHOXAZOLE AND TRIMETHOPRIM 800; 160 MG/1; MG/1
2 TABLET ORAL 2 TIMES DAILY
Qty: 112 TABLET | Refills: 0 | Status: SHIPPED | OUTPATIENT
Start: 2019-07-26 | End: 2019-07-26

## 2019-07-26 RX ORDER — DIAZEPAM 5 MG/1
5 TABLET ORAL 3 TIMES DAILY
Qty: 30 TABLET | Refills: 0 | Status: SHIPPED | OUTPATIENT
Start: 2019-07-26 | End: 2019-08-12

## 2019-07-26 RX ORDER — DIAZEPAM 5 MG/1
5 TABLET ORAL 3 TIMES DAILY
Qty: 30 TABLET | Refills: 0 | Status: SHIPPED | OUTPATIENT
Start: 2019-07-26 | End: 2019-07-26

## 2019-07-26 RX ORDER — TRAMADOL HYDROCHLORIDE 50 MG/1
50 TABLET ORAL EVERY 6 HOURS
Qty: 28 TABLET | Refills: 0 | Status: SHIPPED | OUTPATIENT
Start: 2019-07-26 | End: 2019-07-26

## 2019-07-26 RX ORDER — DEXAMETHASONE 4 MG/1
4 TABLET ORAL 3 TIMES DAILY
Qty: 15 TABLET | Refills: 0 | Status: SHIPPED | OUTPATIENT
Start: 2019-07-26 | End: 2019-07-26 | Stop reason: HOSPADM

## 2019-07-26 RX ORDER — DEXAMETHASONE 4 MG/1
4 TABLET ORAL EVERY 8 HOURS
Status: DISCONTINUED | OUTPATIENT
Start: 2019-07-26 | End: 2019-07-26 | Stop reason: HOSPADM

## 2019-07-26 RX ORDER — DIAZEPAM 5 MG/1
5 TABLET ORAL EVERY 8 HOURS
Status: DISCONTINUED | OUTPATIENT
Start: 2019-07-26 | End: 2019-07-26 | Stop reason: HOSPADM

## 2019-07-26 RX ORDER — DIAZEPAM 5 MG/1
5 TABLET ORAL 3 TIMES DAILY
Qty: 30 TABLET | Refills: 0 | Status: SHIPPED | OUTPATIENT
Start: 2019-07-26 | End: 2019-07-26 | Stop reason: SDUPTHER

## 2019-07-26 RX ORDER — DIAZEPAM 5 MG/1
5 TABLET ORAL 2 TIMES DAILY
Qty: 30 TABLET | Refills: 0 | Status: SHIPPED | OUTPATIENT
Start: 2019-07-26 | End: 2019-07-26

## 2019-07-26 RX ORDER — GABAPENTIN 600 MG/1
600 TABLET ORAL 3 TIMES DAILY
Qty: 90 TABLET | Refills: 2 | Status: SHIPPED | OUTPATIENT
Start: 2019-07-26 | End: 2019-08-12 | Stop reason: SDUPTHER

## 2019-07-26 RX ORDER — DEXAMETHASONE 4 MG/1
4 TABLET ORAL 3 TIMES DAILY
Qty: 15 TABLET | Refills: 0 | Status: SHIPPED | OUTPATIENT
Start: 2019-07-26 | End: 2019-07-26

## 2019-07-26 RX ORDER — TRAMADOL HYDROCHLORIDE 50 MG/1
50 TABLET ORAL EVERY 6 HOURS
Qty: 28 TABLET | Refills: 0 | Status: SHIPPED | OUTPATIENT
Start: 2019-07-26 | End: 2019-08-02

## 2019-07-26 RX ORDER — DEXAMETHASONE 4 MG/1
4 TABLET ORAL EVERY 8 HOURS
Qty: 15 TABLET | Refills: 0 | Status: SHIPPED | OUTPATIENT
Start: 2019-07-26 | End: 2019-07-26 | Stop reason: SDUPTHER

## 2019-07-26 RX ORDER — DEXAMETHASONE 4 MG/1
4 TABLET ORAL EVERY 8 HOURS
Qty: 15 TABLET | Refills: 0 | Status: SHIPPED | OUTPATIENT
Start: 2019-07-26 | End: 2019-07-31

## 2019-07-26 RX ORDER — ACETAMINOPHEN 500 MG
1000 TABLET ORAL DAILY PRN
Refills: 0 | COMMUNITY
Start: 2019-07-26 | End: 2019-07-26

## 2019-07-26 RX ORDER — SULFAMETHOXAZOLE AND TRIMETHOPRIM 800; 160 MG/1; MG/1
2 TABLET ORAL 2 TIMES DAILY
Qty: 112 TABLET | Refills: 0 | Status: SHIPPED | OUTPATIENT
Start: 2019-07-26 | End: 2019-08-12 | Stop reason: SDUPTHER

## 2019-07-26 RX ORDER — TRAMADOL HYDROCHLORIDE 50 MG/1
50 TABLET ORAL EVERY 6 HOURS
Qty: 28 TABLET | Refills: 0 | Status: SHIPPED | OUTPATIENT
Start: 2019-07-26 | End: 2019-07-26 | Stop reason: SDUPTHER

## 2019-07-26 RX ADMIN — DEXAMETHASONE 6 MG: 4 TABLET ORAL at 05:07

## 2019-07-26 RX ADMIN — SULFAMETHOXAZOLE AND TRIMETHOPRIM 2 TABLET: 800; 160 TABLET ORAL at 09:07

## 2019-07-26 RX ADMIN — MORPHINE SULFATE 15 MG: 15 TABLET ORAL at 09:07

## 2019-07-26 RX ADMIN — DIAZEPAM 5 MG: 5 TABLET ORAL at 05:07

## 2019-07-26 RX ADMIN — NICOTINE 1 PATCH: 21 PATCH, EXTENDED RELEASE TRANSDERMAL at 09:07

## 2019-07-26 RX ADMIN — TRAMADOL HYDROCHLORIDE 50 MG: 50 TABLET, FILM COATED ORAL at 11:07

## 2019-07-26 RX ADMIN — DEXAMETHASONE 6 MG: 4 TABLET ORAL at 12:07

## 2019-07-26 RX ADMIN — TRAMADOL HYDROCHLORIDE 50 MG: 50 TABLET, FILM COATED ORAL at 05:07

## 2019-07-26 RX ADMIN — TRAMADOL HYDROCHLORIDE 50 MG: 50 TABLET, FILM COATED ORAL at 12:07

## 2019-07-26 RX ADMIN — MORPHINE SULFATE 15 MG: 15 TABLET ORAL at 03:07

## 2019-07-26 NOTE — DISCHARGE INSTRUCTIONS
Date: 8/5/2019 Status: Joi   Appt Time: 11:00 AM Length: 60   Visit Type: HOSPITAL FOLLOW UP EXTENDED [2800] Copay: $0.00   Provider: Malik Walker MD Dept: [facility]       Dept Address: [address] [place] [id number]       Dept Phone Number: [phone]   Referring Provider:   CSN: [id number]   Notes: hospital follow up    Made On: 7/26/2019 9:51 AM By: ANGEL MCDONOUGH [[id number]] (ES)

## 2019-07-26 NOTE — ASSESSMENT & PLAN NOTE
MRI : Retropharyngeal collection/abscess not seen on today's study due to the placement of a saturation band over this location.  The collection seen flattening the anterior thecal sac at C6-C7 and just below that this plan level appears to be fluid in nature possibly hematoma seroma.  An abscess collection cannot be excluded.    Plan:  -- ENT removed drain and do not plan on any interventions.   -- Bactrim BID  -- Tylenol 1g, dexamethasone 6 mg q6hr, Diazepam 5 mg q8, tramadol 50 mg QID schedule for pain with morphine for break through pain.

## 2019-07-26 NOTE — PLAN OF CARE
Problem: Adult Inpatient Plan of Care  Goal: Patient-Specific Goal (Individualization)  Outcome: Ongoing (interventions implemented as appropriate)  Continue pain medication and steroids as ordered, ambulatory, void per bathroom self care, wife at bedside, no significant change in condition, will continue to monitor

## 2019-07-26 NOTE — TELEPHONE ENCOUNTER
----- Message from Krystina Medel sent at 7/26/2019 12:28 PM CDT -----  Contact: [phone]  Type: Rx Clarification/ Additional Information/ Questions    Medication:diazePAM (VALIUM) 5 MG tablet / traMADol (ULTRAM) 50 mg tablet    What questions do you have about the medication, if any? Interaction between both medications    Pharmacy number:[id number] - [place], [place] - [address] [address] [address]   [phone] (Phone)  [phone] (Fax)      Comments:please advise, thanks

## 2019-07-26 NOTE — SUBJECTIVE & OBJECTIVE
Interval History:     ONE: nursing staff reports that pt frequently leave the unit without difficulty, though he complains of significant pain. He was noted to be abrasive to night staff.     He c/o 7/10 waxing and waning neck and shoulder pain. Pain does not radiate and no focal numbness or weakness. He states that he had difficulty sleeping over night sciatica with pain radiating down LEs and groin and frequent interruption for scheduled medications. He denies saddle anesthesia and notes that his sciatica has not worsened in severity. He has a good appetite and has not had any dysphasia. Denies fevers, chills, CP or SOB. No abd pain, n/v/d.      Review of Systems   Constitutional: Negative for appetite change, chills and fever.   HENT: Negative for trouble swallowing.    Eyes: Negative for visual disturbance.   Respiratory: Negative for cough and shortness of breath.    Cardiovascular: Negative for chest pain.   Gastrointestinal: Negative for abdominal pain, constipation, diarrhea, nausea and vomiting.   Musculoskeletal: Positive for back pain (chronic) and neck pain.   Skin:        surgical incision to L neck   Neurological: Negative for dizziness, light-headedness, numbness and headaches.   Psychiatric/Behavioral: The patient is nervous/anxious.      Objective:     Vital Signs (Most Recent):  Temp: 98.2 °F (36.8 °C) (07/26/19 0759)  Pulse: 75 (07/26/19 0759)  Resp: 17 (07/26/19 0759)  BP: (!) 151/89 (07/26/19 0759)  SpO2: 98 % (07/26/19 0759) Vital Signs (24h Range):  Temp:  [96.9 °F (36.1 °C)-98.7 °F (37.1 °C)] 98.2 °F (36.8 °C)  Pulse:  [64-98] 75  Resp:  [14-18] 17  SpO2:  [96 %-99 %] 98 %  BP: (127-163)/() 151/89     Weight: 55.7 kg (122 lb 12.7 oz)  Body mass index is 18.13 kg/m².    Intake/Output Summary (Last 24 hours) at 7/26/2019 0840  Last data filed at 7/25/2019 1541  Gross per 24 hour   Intake 2132 ml   Output --   Net 2132 ml      Physical Exam   Constitutional: He is oriented to person,  place, and time. He appears well-developed and well-nourished. No distress.   HENT:   Head: Normocephalic and atraumatic.   Eyes: Conjunctivae are normal.   Neck: Normal range of motion. Neck supple.   Surcingle incision to L neck, drain removed, scant discharge. No signs of infection.    Cardiovascular: Normal rate and regular rhythm.   Pulmonary/Chest: Effort normal and breath sounds normal. No respiratory distress. He has no wheezes.   Abdominal: Soft. Bowel sounds are normal. There is no tenderness.   Musculoskeletal: Normal range of motion.   Neurological: He is alert and oriented to person, place, and time.   Skin: Skin is warm and dry. He is not diaphoretic.   Numerous healing scars on his face and BL arms.    Psychiatric: His mood appears anxious. He is agitated.   Pt is manipulative; tearful when addressing his IV drug use, poor cooperation and his behavior with nursing staff, but reverts to normal demeanor quickly otherwise.   Slightly rapid speech.    Nursing note and vitals reviewed.      Significant Labs: All pertinent labs within the past 24 hours have been reviewed.    Significant Imaging: I have reviewed all pertinent imaging results/findings within the past 24 hours.

## 2019-07-26 NOTE — PROGRESS NOTES
[facility] Hospital Medicine  Progress Note    Patient Name: Marcial Thomas  MRN: [id number]  Patient Class: IP- Inpatient   Admission Date: 7/25/2019  Length of Stay: 1 days  Attending Physician: Malik Walker MD  Primary Care Provider: Javad Méndez MD    Hospital Medicine Team: [facility] MED 3 Amber Jones MD    Subjective:     Principal Problem:Retropharyngeal abscess      HPI:  38-year-old male with PMHx IVDU and recently drained pre-vertebral abscess bounce back to the inpatient service after he eloped on 7/20. He still has a drain in place and has not been on any abx since discharge. He reports worsening of cervical neck pain.  He reports a constant 10/10 sharp neck and bilateral shoulders pain. He has been taking aspirin, ibuprofen, and tylenol every 2 hours with minimal relief. He has used IV heroin yesterday and opioids he bought off the street for pain control.  He reports feeling generally warm, but denies fevers or chills. Denies chest pain, SOB, abdominal pain, n/v/d, blood in stool, melena, urinary symptoms, confusion, weakness, congestion, or any other medical complaints.      Work up in the ED: CXR, EKG and Urine are neg. Leukocytosis at 18.   MRI showed: Retropharyngeal collection/abscess not seen on today's study due to the placement of a saturation band over this location.  The collection seen flattening the anterior thecal sac at C6-C7 and just below that this plan level appears to be fluid in nature possibly hematoma seroma.  An abscess collection cannot be excluded.    Overview/Hospital Course:  Pt is a bounce back to IM 3. He has a pre-vertebral abscess drained by ENT and on IV abx when he eloped on 7/20 with a drain still in place in his neck. He has not been on any abx since he left and returns c/o worsening neck pain. Fluid collection seen on MRI, ENT has evaluated pt and they do not plan any interventions at this time. It is difficult to obtain IV access and he  is a flight risk, he was placed on oral abx and pain regiment.     Interval History:     ONE: nursing staff reports that pt frequently leave the unit without difficulty, though he complains of significant pain. He was noted to be abrasive to night staff.     He c/o 7/10 waxing and waning neck and shoulder pain. Pain does not radiate and no focal numbness or weakness. He states that he had difficulty sleeping over night sciatica with pain radiating down LEs and groin and frequent interruption for scheduled medications. He denies saddle anesthesia and notes that his sciatica has not worsened in severity. He has a good appetite and has not had any dysphasia. Denies fevers, chills, CP or SOB. No abd pain, n/v/d.      Review of Systems   Constitutional: Negative for appetite change, chills and fever.   HENT: Negative for trouble swallowing.    Eyes: Negative for visual disturbance.   Respiratory: Negative for cough and shortness of breath.    Cardiovascular: Negative for chest pain.   Gastrointestinal: Negative for abdominal pain, constipation, diarrhea, nausea and vomiting.   Musculoskeletal: Positive for back pain (chronic) and neck pain.   Skin:        surgical incision to L neck   Neurological: Negative for dizziness, light-headedness, numbness and headaches.   Psychiatric/Behavioral: The patient is nervous/anxious.      Objective:     Vital Signs (Most Recent):  Temp: 98.2 °F (36.8 °C) (07/26/19 0759)  Pulse: 75 (07/26/19 0759)  Resp: 17 (07/26/19 0759)  BP: (!) 151/89 (07/26/19 0759)  SpO2: 98 % (07/26/19 0759) Vital Signs (24h Range):  Temp:  [96.9 °F (36.1 °C)-98.7 °F (37.1 °C)] 98.2 °F (36.8 °C)  Pulse:  [64-98] 75  Resp:  [14-18] 17  SpO2:  [96 %-99 %] 98 %  BP: (127-163)/() 151/89     Weight: 55.7 kg (122 lb 12.7 oz)  Body mass index is 18.13 kg/m².    Intake/Output Summary (Last 24 hours) at 7/26/2019 0840  Last data filed at 7/25/2019 1541  Gross per 24 hour   Intake 2132 ml   Output --   Net 2132 ml       Physical Exam   Constitutional: He is oriented to person, place, and time. He appears well-developed and well-nourished. No distress.   HENT:   Head: Normocephalic and atraumatic.   Eyes: Conjunctivae are normal.   Neck: Normal range of motion. Neck supple.   Surcingle incision to L neck, drain removed, scant discharge. No signs of infection.    Cardiovascular: Normal rate and regular rhythm.   Pulmonary/Chest: Effort normal and breath sounds normal. No respiratory distress. He has no wheezes.   Abdominal: Soft. Bowel sounds are normal. There is no tenderness.   Musculoskeletal: Normal range of motion.   Neurological: He is alert and oriented to person, place, and time.   Skin: Skin is warm and dry. He is not diaphoretic.   Numerous healing scars on his face and BL arms.    Psychiatric: His mood appears anxious. He is agitated.   Pt is manipulative; tearful when addressing his IV drug use, poor cooperation and his behavior with nursing staff, but reverts to normal demeanor quickly otherwise.   Slightly rapid speech.    Nursing note and vitals reviewed.      Significant Labs: All pertinent labs within the past 24 hours have been reviewed.    Significant Imaging: I have reviewed all pertinent imaging results/findings within the past 24 hours.      Assessment/Plan:      * Retropharyngeal abscess  MRI : Retropharyngeal collection/abscess not seen on today's study due to the placement of a saturation band over this location.  The collection seen flattening the anterior thecal sac at C6-C7 and just below that this plan level appears to be fluid in nature possibly hematoma seroma.  An abscess collection cannot be excluded.    Plan:  -- ENT removed drain and do not plan on any interventions.   -- Bactrim BID  -- Tylenol 1g, dexamethasone 6 mg q6hr, Diazepam 5 mg q8, tramadol 50 mg QID schedule for pain with morphine for break through pain.     MSSA (methicillin susceptible Staphylococcus aureus) infection  Culture pos  for MSSA.  Difficulty obtaining IV access and pt is a flight risk, PO abx management is most appropriate.     Plan:  -- Sulfamethoxazole / Trimethoprim 800-160 mg BID          Neck pain  Pt has been using IV heroin and opioid for pain control.     Plan:  -- Tylenol 1g, dexamethasone 6 mg q6hr, Diazepam 5 mg q8, tramadol 50 mg QID schedule for pain  -- morphine 15 mg PO prn for break through pain.       Substance use disorder IVDU  Used IV heroin 2 days ago.       Nicotine dependence, cigarettes, uncomplicated  Plan:  -- nicotine patch       History of fusion of cervical spine 2006        Essential hypertension          VTE Risk Mitigation (From admission, onward)        Ordered     IP VTE HIGH RISK PATIENT  Once      07/25/19 1806     Place ODILON hose  Until discontinued      07/25/19 1806     Place sequential compression device  Until discontinued      07/25/19 1605                Amber Jones MD  Department of Hospital Medicine   [facility]

## 2019-07-26 NOTE — DISCHARGE SUMMARY
[facility] Hospital Medicine  Discharge Summary      Patient Name: Marcial Thomas  MRN: [id number]  Admission Date: 7/25/2019  Hospital Length of Stay: 1 days  Discharge Date and Time:  07/26/2019 10:40 AM  Attending Physician: Malik Walker MD   Discharging Provider: Amber Jones MD  Primary Care Provider: Javad Méndez MD  [facility] Medicine Team: [facility]OS[facility] MED 3 Amber Jones MD    HPI:   38-year-old male with PMHx IVDU and recently drained pre-vertebral abscess bounce back to the inpatient service after he eloped on 7/20. He still has a drain in place and has not been on any abx since discharge. He reports worsening of cervical neck pain.  He reports a constant 10/10 sharp neck and bilateral shoulders pain. He has been taking aspirin, ibuprofen, and tylenol every 2 hours with minimal relief. He has used IV heroin yesterday and opioids he bought off the street for pain control.  He reports feeling generally warm, but denies fevers or chills. Denies chest pain, SOB, abdominal pain, n/v/d, blood in stool, melena, urinary symptoms, confusion, weakness, congestion, or any other medical complaints.      Work up in the ED: CXR, EKG and Urine are neg. Leukocytosis at 18.   MRI showed: Retropharyngeal collection/abscess not seen on today's study due to the placement of a saturation band over this location.  The collection seen flattening the anterior thecal sac at C6-C7 and just below that this plan level appears to be fluid in nature possibly hematoma seroma.  An abscess collection cannot be excluded.    * No surgery found *      Hospital Course:   Pt is a bounce back to IM 3. He has a pre-vertebral abscess drained by ENT and on IV abx when he eloped on 7/20 with a drain still in place in his neck. He has not been on any abx since he left and returns c/o worsening neck pain. Fluid collection seen on MRI, ENT has evaluated pt and they do not plan any interventions at this time. It is difficult  to obtain IV access and he is a flight risk, he was placed on oral abx and pain regiment. Pt compliant oral abx regiment. Labs and imagining indicate that treatment regiment appropriate and he was HD stable. He was discharged home with close outpt f/u.      Consults:   Consults (From admission, onward)        Status Ordering Provider     Inpatient consult to ENT  Once     Provider:  (Not yet assigned)    NORMAN Mata          No new Assessment & Plan notes have been filed under this hospital service since the last note was generated.  Service: Hospital Medicine    Final Active Diagnoses:    Diagnosis Date Noted POA    PRINCIPAL PROBLEM:  Retropharyngeal abscess [J39.0] 07/18/2019 Yes    Neck pain [M54.2] 07/25/2019 Yes    MSSA (methicillin susceptible Staphylococcus aureus) infection [A49.01] 07/25/2019 Yes    Substance use disorder IVDU [F19.90] 02/08/2019 Yes    History of fusion of cervical spine 2006 [Z98.1] 02/08/2019 Not Applicable    Nicotine dependence, cigarettes, uncomplicated [F17.210] 02/08/2019 Yes    Essential hypertension [I10]  Yes      Problems Resolved During this Admission:       Discharged Condition: good    Disposition: Home or Self Care    Follow Up:    Patient Instructions:   No discharge procedures on file.    Significant Diagnostic Studies: Labs: All labs within the past 24 hours have been reviewed    Pending Diagnostic Studies:     Procedure Component Value Units Date/Time    CBC auto differential [[id number]]     Order Status:  Sent Lab Status:  No result     Specimen:  Blood     Potassium [[id number]]     Order Status:  Sent Lab Status:  No result     Specimen:  Blood          Medications:  Reconciled Home Medications:      Medication List      START taking these medications    acetaminophen 500 MG tablet  Commonly known as:  TYLENOL  Take 2 tablets (1,000 mg total) by mouth daily as needed for Pain.     dexAMETHasone 4 MG Tab  Commonly known as:  DECADRON  Take 1 tablet  (4 mg total) by mouth 3 (three) times daily. for 5 days     diazePAM 5 MG tablet  Commonly known as:  VALIUM  Take 1 tablet (5 mg total) by mouth 2 (two) times daily.     sulfamethoxazole-trimethoprim 800-160mg 800-160 mg Tab  Commonly known as:  BACTRIM DS  Take 2 tablets by mouth 2 (two) times daily.     traMADol 50 mg tablet  Commonly known as:  ULTRAM  Take 1 tablet (50 mg total) by mouth every 6 (six) hours. for 7 days        STOP taking these medications    esomeprazole 20 MG capsule  Commonly known as:  NEXIUM     gabapentin 800 MG tablet  Commonly known as:  NEURONTIN     methylPREDNISolone 4 mg tablet  Commonly known as:  MEDROL DOSEPACK            Indwelling Lines/Drains at time of discharge:   Lines/Drains/Airways     Drain                 Open Drain 07/19/19 1224 Right;Anterior Neck Penrose Other (see comments) 6 days                Time spent on the discharge of patient: 35 minutes  Patient was seen and examined on the date of discharge and determined to be suitable for discharge.         Amber Jones MD  Department of Hospital Medicine  [facility]

## 2019-07-26 NOTE — SUBJECTIVE & OBJECTIVE
Interval History: NAEON. Reports improved pain. Normal ROM. Scant to absent drainage from penrose.       Medications:  Continuous Infusions:  Scheduled Meds:   acetaminophen  1,000 mg Oral TID    dexAMETHasone  6 mg Oral Q6H    diazePAM  5 mg Oral Q8H    famotidine  20 mg Oral BID    nicotine  1 patch Transdermal ED 1 Time    nicotine  1 patch Transdermal Daily    sulfamethoxazole-trimethoprim 800-160mg  2 tablet Oral BID    traMADol  50 mg Oral 4 times per day     PRN Meds:morphine, sodium chloride 0.9%, sodium chloride 0.9%     No current facility-administered medications on file prior to encounter.      Current Outpatient Medications on File Prior to Encounter   Medication Sig    esomeprazole (NEXIUM) 20 MG capsule Take 20 mg by mouth before breakfast.    gabapentin (NEURONTIN) 800 MG tablet Take 1 tablet (800 mg total) by mouth 3 (three) times daily as needed.       Review of patient's allergies indicates:   Allergen Reactions    Vicodin [hydrocodone-acetaminophen] Hives       Past Medical History:   Diagnosis Date    Abscess, prevertebral soft tissue of neck 7/17/2019 7/16/2019 CT neck done at  Large fluid collection with a few foci of gas centered in the prevertebral soft tissues anterior to the C3-C7 levels, highly suspicious for prevertebral abscess.  The larynx is displaced anteriorly. Anterior fusion of C5-C6. Findings were discussed with Ramiro Carrasco MD on 7/16/2019 at 1530 hours.  Additional contrast enhanced CT images of the neck were recommended.    Anxiety     Cervical radiculopathy     Chronic neck pain     Hypertension     Lumbar radiculopathy 7/2016 MRI with degeneration, mild central stenosis L3-4 2/8/2019 7/7/16 MRI L spine IMPRESSION:  1.  Mild discogenic predominant degenerative changes of the lower lumbar spine with mild central canal stenosis at L3-4. 2. Annular fissures at L3-4 and L4-5.     Neuromuscular disorder     Nodule of left lobe of thyroid gland  incidental on CT 7/2019 8 mm rec's thyroid US 7/18/2019    Opioid use disorder 2/8/2019     Past Surgical History:   Procedure Laterality Date    cervicle fusion      EPIDURAL BLOCK INJECTION      lumbar x 2    INCISION AND DRAINAGE, ABSCESS, prevertebral Right 7/19/2019    Performed by Kole Cornelius MD at [facility] OR [address] [place] [address]     Family History     Problem Relation (Age of Onset)    No Known Problems Mother, Father, Sister, Sister        Tobacco Use    Smoking status: Current Every Day Smoker     Packs/day: 1.00     Types: Cigarettes    Smokeless tobacco: Former User   Substance and Sexual Activity    Alcohol use: No    Drug use: Yes     Types: IV     Comment: pain pills and IV Heroin this am    Sexual activity: Yes     Partners: Female     Review of Systems   Constitutional: Negative for appetite change, chills, fatigue and fever.   HENT: Negative for facial swelling, sore throat and voice change.    Respiratory: Negative for shortness of breath and wheezing.    Musculoskeletal: Positive for back pain and neck pain.   Neurological: Positive for numbness.          Objective:     Vital Signs (Most Recent):  Temp: 98.3 °F (36.8 °C) (07/26/19 0007)  Pulse: 98 (07/26/19 0345)  Resp: 18 (07/26/19 0007)  BP: (!) 143/87 (07/26/19 0007)  SpO2: 97 % (07/26/19 0007) Vital Signs (24h Range):  Temp:  [96.9 °F (36.1 °C)-98.7 °F (37.1 °C)] 98.3 °F (36.8 °C)  Pulse:  [64-98] 98  Resp:  [14-18] 18  SpO2:  [96 %-100 %] 97 %  BP: (127-163)/() 143/87     Weight: 55.7 kg (122 lb 12.7 oz)  Body mass index is 18.13 kg/m².        Physical Exam  Appearance: Awake, alert and oriented. In some pain but no distress.  Eyes: Pupils equal, round and reactive. Extraocular muscles intact. Vision grossly intact.  Nose: External appearance normal. No evidence of septal deviation. Inferior turbinates within normal limits  Ears:  Right: External appearance normal. External auditory canal within normal limits. Tympanic membrane  translucent. No evidence of infection or effusion.  Left: External appearance normal. External auditory canal within normal limits. Tympanic membrane translucent. No evidence of infection or effusion.  Mouth: Mucosal membranes moist. Tongue mobile. Oropharynx clear and patent.  Neck: R sided penrose in place with minimal serosanguinous output. No anterior or posterior cervical lymphadenopathy.  Respiratory: Normal work of breathing. No stridor or stertor      Significant Labs:  CBC:   Recent Labs   Lab 07/25/19  0857   WBC 18.48*   RBC 3.83*   HGB 10.7*   HCT 34.8*   *   MCV 91   MCH 27.9   MCHC 30.7*       Significant Diagnostics:  MRI: I have reviewed all pertinent results/findings within the past 24 hours and the reading was:      Retropharyngeal collection/abscess not seen on today's study due to the placement of a saturation band over this location.    The collection seen flattening the anterior thecal sac at C6-C7 and just below that this plan level appears to be fluid in nature possibly hematoma seroma.  An abscess collection cannot be excluded.

## 2019-07-26 NOTE — PLAN OF CARE
SW following for D/C needs. SW is in communication with patient's CM and patient's Care Team - IM3. TALA will continue to follow.      07/26/19 0958   Post-Acute Status   Post-Acute Authorization Other   Other Status No Post-Acute Service Needs     Neli Roth LMSW   - [facility]  Ext. [phone]

## 2019-07-26 NOTE — PROGRESS NOTES
[facility]  Otorhinolaryngology-Head & Neck Surgery  Progress Note    Subjective:     Post-Op Info:  * No surgery found *      Hospital Day: 2     Interval History: NAEON. Reports improved pain. Normal ROM. Scant to absent drainage from penrose.       Medications:  Continuous Infusions:  Scheduled Meds:   acetaminophen  1,000 mg Oral TID    dexAMETHasone  6 mg Oral Q6H    diazePAM  5 mg Oral Q8H    famotidine  20 mg Oral BID    nicotine  1 patch Transdermal ED 1 Time    nicotine  1 patch Transdermal Daily    sulfamethoxazole-trimethoprim 800-160mg  2 tablet Oral BID    traMADol  50 mg Oral 4 times per day     PRN Meds:morphine, sodium chloride 0.9%, sodium chloride 0.9%     No current facility-administered medications on file prior to encounter.      Current Outpatient Medications on File Prior to Encounter   Medication Sig    esomeprazole (NEXIUM) 20 MG capsule Take 20 mg by mouth before breakfast.    gabapentin (NEURONTIN) 800 MG tablet Take 1 tablet (800 mg total) by mouth 3 (three) times daily as needed.       Review of patient's allergies indicates:   Allergen Reactions    Vicodin [hydrocodone-acetaminophen] Hives       Past Medical History:   Diagnosis Date    Abscess, prevertebral soft tissue of neck 7/17/2019 7/16/2019 CT neck done at  Large fluid collection with a few foci of gas centered in the prevertebral soft tissues anterior to the C3-C7 levels, highly suspicious for prevertebral abscess.  The larynx is displaced anteriorly. Anterior fusion of C5-C6. Findings were discussed with Ramiro Carrasco MD on 7/16/2019 at 1530 hours.  Additional contrast enhanced CT images of the neck were recommended.    Anxiety     Cervical radiculopathy     Chronic neck pain     Hypertension     Lumbar radiculopathy 7/2016 MRI with degeneration, mild central stenosis L3-4 2/8/2019 7/7/16 MRI L spine IMPRESSION:  1.  Mild discogenic predominant degenerative changes of the lower  lumbar spine with mild central canal stenosis at L3-4. 2. Annular fissures at L3-4 and L4-5.     Neuromuscular disorder     Nodule of left lobe of thyroid gland incidental on CT 7/2019 8 mm rec's thyroid US 7/18/2019    Opioid use disorder 2/8/2019     Past Surgical History:   Procedure Laterality Date    cervicle fusion      EPIDURAL BLOCK INJECTION      lumbar x 2    INCISION AND DRAINAGE, ABSCESS, prevertebral Right 7/19/2019    Performed by Kole Cornelius MD at [facility] OR [address] [place] [address]     Family History     Problem Relation (Age of Onset)    No Known Problems Mother, Father, Sister, Sister        Tobacco Use    Smoking status: Current Every Day Smoker     Packs/day: 1.00     Types: Cigarettes    Smokeless tobacco: Former User   Substance and Sexual Activity    Alcohol use: No    Drug use: Yes     Types: IV     Comment: pain pills and IV Heroin this am    Sexual activity: Yes     Partners: Female     Review of Systems   Constitutional: Negative for appetite change, chills, fatigue and fever.   HENT: Negative for facial swelling, sore throat and voice change.    Respiratory: Negative for shortness of breath and wheezing.    Musculoskeletal: Positive for back pain and neck pain.   Neurological: Positive for numbness.          Objective:     Vital Signs (Most Recent):  Temp: 98.3 °F (36.8 °C) (07/26/19 0007)  Pulse: 98 (07/26/19 0345)  Resp: 18 (07/26/19 0007)  BP: (!) 143/87 (07/26/19 0007)  SpO2: 97 % (07/26/19 0007) Vital Signs (24h Range):  Temp:  [96.9 °F (36.1 °C)-98.7 °F (37.1 °C)] 98.3 °F (36.8 °C)  Pulse:  [64-98] 98  Resp:  [14-18] 18  SpO2:  [96 %-100 %] 97 %  BP: (127-163)/() 143/87     Weight: 55.7 kg (122 lb 12.7 oz)  Body mass index is 18.13 kg/m².        Physical Exam  Appearance: Awake, alert and oriented. In some pain but no distress.  Eyes: Pupils equal, round and reactive. Extraocular muscles intact. Vision grossly intact.  Nose: External appearance normal. No evidence of septal  deviation. Inferior turbinates within normal limits  Ears:  Right: External appearance normal. External auditory canal within normal limits. Tympanic membrane translucent. No evidence of infection or effusion.  Left: External appearance normal. External auditory canal within normal limits. Tympanic membrane translucent. No evidence of infection or effusion.  Mouth: Mucosal membranes moist. Tongue mobile. Oropharynx clear and patent.  Neck: R sided penrose in place with minimal serosanguinous output. No anterior or posterior cervical lymphadenopathy.  Respiratory: Normal work of breathing. No stridor or stertor      Significant Labs:  CBC:   Recent Labs   Lab 07/25/19  0857   WBC 18.48*   RBC 3.83*   HGB 10.7*   HCT 34.8*   *   MCV 91   MCH 27.9   MCHC 30.7*       Significant Diagnostics:  MRI: I have reviewed all pertinent results/findings within the past 24 hours and the reading was:      Retropharyngeal collection/abscess not seen on today's study due to the placement of a saturation band over this location.    The collection seen flattening the anterior thecal sac at C6-C7 and just below that this plan level appears to be fluid in nature possibly hematoma seroma.  An abscess collection cannot be excluded.    Assessment/Plan:     * Retropharyngeal abscess  38-year-old male s/p I&D on 7/19 for retropharyngeal/prevertebral abscess, left AMA on 7/20 without completing antibiotic course and penrose in place.     -Care per primary team  -MRI does not appear to show recurrent abscess formation, likely post-operative changes   -Penrose removed  -No current intervention from ENT, call/page with questions/concerns      Laly Mendez MD  Otorhinolaryngology-Head & Neck Surgery  [facility]

## 2019-07-26 NOTE — TELEPHONE ENCOUNTER
Pharmacy is questioning if the patient should be on both valium and ultram.  There is a contraindication for meds not to be taking together.

## 2019-07-26 NOTE — PLAN OF CARE
CM at bedside to discuss discharge planning assessment.   Patient lives with his wife in an RV with 3 steps to entrance.  Independent with ADL and does not use medical equipment.CM name and phone # written on board and explained CM services during patient's stay in hospital.     07/26/19 1057   Discharge Assessment   Assessment Type Discharge Planning Assessment   Confirmed/corrected address and phone number on facesheet? Yes   Assessment information obtained from? Patient   Expected Length of Stay (days) 2   Communicated expected length of stay with patient/caregiver yes   Prior to hospitilization cognitive status: Alert/Oriented   Prior to hospitalization functional status: Independent   Current cognitive status: Alert/Oriented   Current Functional Status: Independent   Facility Arrived From: home   Lives With spouse   Able to Return to Prior Arrangements yes   Is patient able to care for self after discharge? Yes   Who are your caregiver(s) and their phone number(s)? self   Patient's perception of discharge disposition home or selfcare   Readmission Within the Last 30 Days previous discharge plan unsuccessful   If yes, most recent facility name: [facility] [place] [place]    Patient currently being followed by outpatient case management? No   Patient currently receives any other outside agency services? No   Equipment Currently Used at Home cane, straight   Do you have any problems affording any of your prescribed medications? No   Is the patient taking medications as prescribed? yes   Does the patient have transportation home? Yes   Transportation Anticipated family or friend will provide   Dialysis Name and Scheduled days n/a   Does the patient receive services at the Coumadin Clinic? No   Discharge Plan A Home with family   Discharge Plan B Home with family   DME Needed Upon Discharge  none   Readmission Questionnaire   At the time of your discharge, did someone talk to you about what your health problems were? Yes   At the time of  discharge, did someone talk to you about what to watch out for regarding worsening of your health problem? Yes   At the time of discharge, did someone talk to you about what to do if you experienced worsening of your health problem? Yes   At the time of discharge, did someone talk to you about which medication to take when you left the hospital and which ones to stop taking? Yes   At the time of discharge, did someone talk to you about when and where to follow up with a doctor after you left the hospital? Yes   How often do you need to have someone help you when you read instructions, pamphlets, or other written material from your doctor or pharmacy? Never   Do you have problems taking your medications as prescribed? No   Do you have any problems affording any of  your prescribed medications? No   Do you have problems obtaining/receiving your medications? No   Does the patient have transportation to healthcare appointments? Yes   Living Arrangements   (RV)   Does the patient have family/friends to help with healtcare needs after discharge? yes   Are you currently feeling confused? No   Are you currently having problems thinking? No   Are you currently having memory problems? No     Javad Méndez MD  [address] [place] / [place] [address]    [id number] - [place], [place] - [address]  [address]  [place] [address]  Phone: [phone] Fax: [phone]    [facility] #[id number] - [place], [place] - [address]  [address]  [place] [address]  Phone: [phone] Fax: [phone]    Extended Emergency Contact Information  Primary Emergency Contact: Cyndie Thomas  Address: [address] [place] [address]  Home Phone: [phone]  Mobile Phone: [phone]  Relation: Spouse

## 2019-07-26 NOTE — NURSING
"Writer went to check on patient to see if he received his medications from the pharmacy. Patient's room was found empty with all of the patient's belongings gone. Patient was paged but did not return to the floor. Patient was contacted on his mobile phone number and stated that his wife and him were already back in [place], [place] and would have to drive an hour to come back to [place]. I asked him to return to the facility and the patient said okay and hung up. At 1445, patient's wife called writer back cursing and stating that her  was having a panic attack because he was told that he needed to come back to the hospital. She went on to say that they did not have the gas money to come back and will not get their disability paychecks until next week. She then began to curse again stating that she takes better care of her  than we do and stated "the nurse from last night couldn't even change his bandage correctly." Patient wife then hung up. Patient's attending team was notified of all conversations with the patient and are aware of his elopement.   "

## 2019-07-26 NOTE — PLAN OF CARE
07/26/19 1200   Final Note   Assessment Type Final Discharge Note   Anticipated Discharge Disposition Home   What phone number can be called within the next 1-3 days to see how you are doing after discharge? [phone]   Hospital Follow Up  Appt(s) scheduled? Yes   Right Care Referral Info   Post Acute Recommendation No Care     Future Appointments   Date Time Provider Department Center   8/5/2019 11:00 AM [facility] [address] [facility]   8/20/2019  1:15 PM Travis Rivera Jr., MD [facility]

## 2019-07-26 NOTE — ASSESSMENT & PLAN NOTE
38-year-old male s/p I&D on 7/19 for retropharyngeal/prevertebral abscess, left AMA on 7/20 without completing antibiotic course and penrose in place.     -Care per primary team  -MRI does not appear to show recurrent abscess formation, likely post-operative changes   -Penrose removed  -No current intervention from ENT, call/page with questions/concerns

## 2019-07-27 LAB — BACTERIA SPEC AEROBE CULT: ABNORMAL

## 2019-07-28 LAB
BACTERIA BLD CULT: ABNORMAL
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
## 2019-07-29 ENCOUNTER — TELEPHONE (OUTPATIENT)
Dept: NEUROSURGERY | Facility: CLINIC | Age: 39
End: 2019-07-29

## 2019-07-29 ENCOUNTER — TELEPHONE (OUTPATIENT)
Dept: HEPATOLOGY | Facility: CLINIC | Age: 39
End: 2019-07-29

## 2019-07-29 NOTE — TELEPHONE ENCOUNTER
"Called patient to inform him of the results of his MRI. Patient was informed that his MRI showed a fluid collect in the spinal area and he needed to come back to the E.R. for evaluations and to be admitted. Patient was informed of the seriousness of the results and the possibly of what could happen. Patient begin to scream in the phone " stating he is on disable and had no way of getting back here to the hospital. Patient also stated "that his pain was better at this time." Patient was encouraged to come to the E.R. if he noticed any changes in his condition or if his pain became severe. Patient verbalized clear understanding.  "

## 2019-07-29 NOTE — TELEPHONE ENCOUNTER
Spoke with the patient.   His blood culture came out positive one for MSSA and one for MRSA and other negative so far.  He stated he is feeling well, taking the antibiotics religiously.  Neck pain is better.  No fever or chill.  He currently does not want to go back to ED due to financial concerns.  He wants to continue to take the medications.    I informed him of his follow up with me on Monday, Aug 5 at 11 am.  If he develops fever or worsening pain or neurological changes, he would need to go to ED emergency.

## 2019-07-30 LAB — BACTERIA BLD CULT: NORMAL

## 2019-07-31 ENCOUNTER — NURSE TRIAGE (OUTPATIENT)
Dept: ADMINISTRATIVE | Facility: CLINIC | Age: 39
End: 2019-07-31

## 2019-07-31 NOTE — TELEPHONE ENCOUNTER
Reason for Disposition   Patient sounds very sick or weak to the triager    Protocols used: NECK PAIN OR [id number]    Marcial states he is in severe pain to his neck.  States had abscess in the area of cervical fusion / hardware, drained earlier this month.  Crying, moaning, states pain is severe.  Can't get comfortable, can't sleep.  Will go to ED now.  Message to Javad Méndez MD, pcp, and also to Dr Malik Walker (patient states he is supposed to follow up with Dr Walker on Monday).  Please contact caller directly with any additional care advice.

## 2019-08-05 NOTE — PHYSICIAN QUERY
PT Name: Marcial Thomas  MR #: [id number]    Physician Query Form - Consultant Diagnosis Clarification     CDS: Belén Phelps RN  Contact information: [email or web address]  This form is a permanent document in the medical record.     Query Date: August 5, 2019      By submitting this query, we are merely seeking further clarification of documentation.  Please utilize your independent clinical judgment when addressing the question(s) below.      The Medical record contains the following:   Diagnosis Supporting Clinical Information Location in Medical Record   Epidural abscess     MRI c-spine +/- with retropharyngeal abscess at the C5-6 level. Likely osteomyelitis. Small component of epidural abscess without spinal canal narrowing/cord compression.      MRI C spine reviewed w/Dr. Hinojosa. Epidural component to abscess is nonoperative      Thin focus of abnormal signal at the C6-7 level within the epidural space along the posterior aspect of the disc and anterior aspect of the thecal sac with resulting mild narrowing of the spinal canal.  Finding is favored to represent postoperative hematoma or seroma, with abscess not excluded.     Nsgy consult note 7/18          Nsgy care update  7/18      MRI C spine 7/18     Do you agree with the Consultants diagnosis of epidural abscess?    [ X  ] Yes   [  ] No   [  ] Clinically insignificant   [  ] Other/Clarification of findings:   [  ] Clinically undetermined
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
## 2019-08-12 ENCOUNTER — TELEPHONE (OUTPATIENT)
Dept: INTERNAL MEDICINE | Facility: CLINIC | Age: 39
End: 2019-08-12

## 2019-08-12 ENCOUNTER — OFFICE VISIT (OUTPATIENT)
Dept: PRIMARY CARE CLINIC | Facility: CLINIC | Age: 39
End: 2019-08-12
Payer: MEDICARE

## 2019-08-12 VITALS
DIASTOLIC BLOOD PRESSURE: 104 MMHG | HEART RATE: 86 BPM | OXYGEN SATURATION: 99 % | BODY MASS INDEX: 18.09 KG/M2 | HEIGHT: 69 IN | WEIGHT: 122.13 LBS | SYSTOLIC BLOOD PRESSURE: 150 MMHG

## 2019-08-12 DIAGNOSIS — Z98.1 HISTORY OF FUSION OF CERVICAL SPINE: ICD-10-CM

## 2019-08-12 DIAGNOSIS — A49.01 MSSA (METHICILLIN SUSCEPTIBLE STAPHYLOCOCCUS AUREUS) INFECTION: Primary | ICD-10-CM

## 2019-08-12 DIAGNOSIS — F19.90 SUBSTANCE USE DISORDER: ICD-10-CM

## 2019-08-12 DIAGNOSIS — F17.210 NICOTINE DEPENDENCE, CIGARETTES, UNCOMPLICATED: ICD-10-CM

## 2019-08-12 DIAGNOSIS — J39.0 RETROPHARYNGEAL ABSCESS: ICD-10-CM

## 2019-08-12 DIAGNOSIS — A49.02 MRSA INFECTION: ICD-10-CM

## 2019-08-12 DIAGNOSIS — M54.2 NECK PAIN: ICD-10-CM

## 2019-08-12 PROCEDURE — 99999 PR PBB SHADOW E&M-EST. PATIENT-LVL IV: ICD-10-PCS | Mod: PBBFAC,,, | Performed by: INTERNAL MEDICINE

## 2019-08-12 PROCEDURE — 3077F SYST BP >= 140 MM HG: CPT | Mod: S$GLB,,, | Performed by: INTERNAL MEDICINE

## 2019-08-12 PROCEDURE — 3008F PR BODY MASS INDEX (BMI) DOCUMENTED: ICD-10-PCS | Mod: S$GLB,,, | Performed by: INTERNAL MEDICINE

## 2019-08-12 PROCEDURE — 3077F PR MOST RECENT SYSTOLIC BLOOD PRESSURE >= 140 MM HG: ICD-10-PCS | Mod: S$GLB,,, | Performed by: INTERNAL MEDICINE

## 2019-08-12 PROCEDURE — 99215 PR OFFICE/OUTPT VISIT, EST, LEVL V, 40-54 MIN: ICD-10-PCS | Mod: S$GLB,,, | Performed by: INTERNAL MEDICINE

## 2019-08-12 PROCEDURE — 99999 PR PBB SHADOW E&M-EST. PATIENT-LVL IV: CPT | Mod: PBBFAC,,, | Performed by: INTERNAL MEDICINE

## 2019-08-12 PROCEDURE — 3008F BODY MASS INDEX DOCD: CPT | Mod: S$GLB,,, | Performed by: INTERNAL MEDICINE

## 2019-08-12 PROCEDURE — 3080F DIAST BP >= 90 MM HG: CPT | Mod: S$GLB,,, | Performed by: INTERNAL MEDICINE

## 2019-08-12 PROCEDURE — 99215 OFFICE O/P EST HI 40 MIN: CPT | Mod: S$GLB,,, | Performed by: INTERNAL MEDICINE

## 2019-08-12 PROCEDURE — 3080F PR MOST RECENT DIASTOLIC BLOOD PRESSURE >= 90 MM HG: ICD-10-PCS | Mod: S$GLB,,, | Performed by: INTERNAL MEDICINE

## 2019-08-12 RX ORDER — GABAPENTIN 600 MG/1
600 TABLET ORAL 3 TIMES DAILY
Qty: 90 TABLET | Refills: 2 | Status: SHIPPED | OUTPATIENT
Start: 2019-08-12 | End: 2019-08-12

## 2019-08-12 RX ORDER — GABAPENTIN 800 MG/1
800 TABLET ORAL 3 TIMES DAILY
Qty: 90 TABLET | Refills: 2 | Status: SHIPPED | OUTPATIENT
Start: 2019-08-12 | End: 2019-09-17 | Stop reason: SDUPTHER

## 2019-08-12 RX ORDER — DOXYCYCLINE 100 MG/1
100 CAPSULE ORAL 2 TIMES DAILY
Qty: 14 CAPSULE | Refills: 0 | Status: SHIPPED | OUTPATIENT
Start: 2019-08-12 | End: 2019-08-19

## 2019-08-12 RX ORDER — SULFAMETHOXAZOLE AND TRIMETHOPRIM 800; 160 MG/1; MG/1
2 TABLET ORAL 2 TIMES DAILY
Qty: 112 TABLET | Refills: 0 | Status: SHIPPED | OUTPATIENT
Start: 2019-08-12 | End: 2019-09-09

## 2019-08-12 NOTE — PROGRESS NOTES
[facility]  New Visit Progress Note   Recent Hospital Discharge     PRESENTING HISTORY     Chief Complaint/Reason for Visit:  Follow up Hospital Discharge   Chief Complaint   Patient presents with    Follow-up     hospital     PCP: Javad Méndez MD    History of Present Illness: Mr. Marcial Thomas is a 39 y.o. male who was recently admitted to the hospital.     Admission Date: 7/25/2019  Hospital Length of Stay: 1 days  Discharge Date and Time:  07/26/2019 10:40 AM  Attending Physician: Malik Walker MD   Discharging Provider: Amber Jones MD  Primary Care Provider: Javad Méndez MD  Hospital Medicine Team: [facility] Amber Jones MD     HPI:   38-year-old male with PMHx IVDU and recently drained pre-vertebral abscess bounce back to the inpatient service after he eloped on 7/20. He still has a drain in place and has not been on any abx since discharge. He reports worsening of cervical neck pain.  He reports a constant 10/10 sharp neck and bilateral shoulders pain. He has been taking aspirin, ibuprofen, and tylenol every 2 hours with minimal relief. He has used IV heroin yesterday and opioids he bought off the street for pain control.  He reports feeling generally warm, but denies fevers or chills. Denies chest pain, SOB, abdominal pain, n/v/d, blood in stool, melena, urinary symptoms, confusion, weakness, congestion, or any other medical complaints.      Work up in the ED: CXR, EKG and Urine are neg. Leukocytosis at 18.   MRI showed: Retropharyngeal collection/abscess not seen on today's study due to the placement of a saturation band over this location.  The collection seen flattening the anterior thecal sac at C6-C7 and just below that this plan level appears to be fluid in nature possibly hematoma seroma.  An abscess collection cannot be excluded.     Hospital Course:   Pt is a bounce back to IM 3. He has a pre-vertebral abscess drained by ENT and on IV abx when he eloped on 7/20 with a  drain still in place in his neck. He has not been on any abx since he left and returns c/o worsening neck pain. Fluid collection seen on MRI, ENT has evaluated pt and they do not plan any interventions at this time. It is difficult to obtain IV access and he is a flight risk, he was placed on oral abx and pain regiment. Pt compliant oral abx regiment. Labs and imagining indicate that treatment regiment appropriate and he was HD stable. He was discharged home with close outpt f/u.       Consults:           Consults (From admission, onward)         Status Ordering Provider       Inpatient consult to ENT  Once     Provider:  (Not yet assigned)    NORMAN Mata             No new Assessment & Plan notes have been filed under this hospital service since the last note was generated.  Service: Hospital Medicine           Final Active Diagnoses:     Diagnosis Date Noted POA    PRINCIPAL PROBLEM:  Retropharyngeal abscess [J39.0] 07/18/2019 Yes    Neck pain [M54.2] 07/25/2019 Yes    MSSA (methicillin susceptible Staphylococcus aureus) infection [A49.01] 07/25/2019 Yes    Substance use disorder IVDU [F19.90] 02/08/2019 Yes    History of fusion of cervical spine 2006 [Z98.1] 02/08/2019 Not Applicable    Nicotine dependence, cigarettes, uncomplicated [F17.210] 02/08/2019 Yes    Essential hypertension [I10]   Yes         __________________________________________________________________    Today:    He has burning sensation to right arm for past one month.  No weakness to arm.    No fever or chill.   He has been taking Ibuprofen OTC or street [place] for pain.    He does not want to go to ED or hospital if possible.      PAST HISTORY:     Past Medical History:   Diagnosis Date    Abscess, prevertebral soft tissue of neck 7/17/2019 7/16/2019 CT neck done at  Large fluid collection with a few foci of gas centered in the prevertebral soft tissues anterior to the C3-C7 levels, highly suspicious for prevertebral  abscess.  The larynx is displaced anteriorly. Anterior fusion of C5-C6. Findings were discussed with Ramiro Carrasco MD on 7/16/2019 at 1530 hours.  Additional contrast enhanced CT images of the neck were recommended.    Chronic neck pain     Lumbar radiculopathy 7/2016 MRI with degeneration, mild central stenosis L3-4 2/8/2019 7/7/16 MRI L spine IMPRESSION:  1.  Mild discogenic predominant degenerative changes of the lower lumbar spine with mild central canal stenosis at L3-4. 2. Annular fissures at L3-4 and L4-5.     MRSA infection 8/12/2019    MSSA (methicillin susceptible Staphylococcus aureus) infection 7/25/2019    Nodule of left lobe of thyroid gland incidental on CT 7/2019 8 mm rec's thyroid US 7/18/2019    Opioid use disorder 2/8/2019    Retropharyngeal abscess 7/19/19 I+D 7/18/2019 7-  history of cervical fusion C5-6, IV drug abuse, neuromuscular disorder, hypertension, anxiety who presents with retropharyngeal abscess.  Evaluated by Neurosurgery and ENT as he still has hardware in his neck.  Patient went for I&D 7/19 with ENT. Surgical cultures grew Staph. Neurosurgery not recommending any acute intervention.  Blood cultures grew MSSA.  Was on vancomycin and continuo       Past Surgical History:   Procedure Laterality Date    cervicle fusion      EPIDURAL BLOCK INJECTION      lumbar x 2    INCISION AND DRAINAGE, ABSCESS, prevertebral Right 7/19/2019    Performed by Kole Cornelius MD at [facility] OR [address] [place] [address]       Family History   Problem Relation Age of Onset    No Known Problems Mother     No Known Problems Father     No Known Problems Sister     No Known Problems Sister        Social History     Socioeconomic History    Marital status:      Spouse name: Not on file    Number of children: Not on file    Years of education: Not on file    Highest education level: Not on file   Occupational History    Occupation: side jobs/piece work.     Social Needs     Financial resource strain: Not on file    Food insecurity:     Worry: Not on file     Inability: Not on file    Transportation needs:     Medical: Not on file     Non-medical: Not on file   Tobacco Use    Smoking status: Current Every Day Smoker     Packs/day: 1.00     Types: Cigarettes    Smokeless tobacco: Former User   Substance and Sexual Activity    Alcohol use: No    Drug use: Yes     Types: IV     Comment: pain pills and IV Heroin this am    Sexual activity: Yes     Partners: Female   Lifestyle    Physical activity:     Days per week: Not on file     Minutes per session: Not on file    Stress: Not on file   Relationships    Social connections:     Talks on phone: Not on file     Gets together: Not on file     Attends Latter day service: Not on file     Active member of club or organization: Not on file     Attends meetings of clubs or organizations: Not on file     Relationship status: Not on file   Other Topics Concern    Not on file   Social History Narrative    He does side jobs.    History of IVDU           MEDICATIONS & ALLERGIES:     Current Outpatient Medications on File Prior to Visit   Medication Sig Dispense Refill    acetaminophen (TYLENOL) 500 MG tablet Take 2 tablets (1,000 mg total) by mouth daily as needed for Pain.  0     gabapentin (NEURONTIN) 600 MG tablet Take 1 tablet (600 mg total) by mouth 3 (three) times daily. 90 tablet 2     sulfamethoxazole-trimethoprim 800-160mg (BACTRIM DS) 800-160 mg Tab Take 2 tablets by mouth 2 (two) times daily. 112 tablet 0     Review of patient's allergies indicates:   Allergen Reactions    Vicodin [hydrocodone-acetaminophen] Hives       OBJECTIVE:     Vital Signs:  Vitals:    08/12/19 1310   BP: (!) 150/104   Pulse: 86     Wt Readings from Last 1 Encounters:   08/12/19 1310 55.4 kg (122 lb 2.2 oz)     Body mass index is 18.04 kg/m².     Physical Exam:  General: Well developed, well nourished. No distress.  HEENT: Head is normocephalic,  atraumatic; ears are normal.    Eyes: Clear conjunctiva.  Neck: Supple, symmetrical neck; trachea midline.  Right lower neck.  Slight drainage from previous scar area - clear. No abscess palpated.    No redness.  Lungs: Clear to auscultation bilaterally and normal respiratory effort.  Cardiovascular: Heart with regular rate and rhythm.    Extremities: No LE edema. No   Abdomen: Abdomen is soft, non-tender non-distended with normal bowel sounds.  Musculoskeletal: Normal gait.  Arms and hand full strength bilaterally.  Lymph Nodes: No cervical, supraclavicular or axillary adenopathy.  Psychiatric: Normal affect. Alert.    Laboratory  Lab Results   Component Value Date    WBC 18.48 (H) 07/25/2019    HGB 10.7 (L) 07/25/2019    HCT 34.8 (L) 07/25/2019    MCV 91 07/25/2019     (H) 07/25/2019     BMP  Lab Results   Component Value Date     07/26/2019    K 6.4 (HH) 07/26/2019     07/26/2019    CO2 17 (L) 07/26/2019    BUN 9 07/26/2019    CREATININE 0.8 07/26/2019    CALCIUM 9.4 07/26/2019    ANIONGAP 16 07/26/2019    ESTGFRAFRICA >60.0 07/26/2019    EGFRNONAA >60.0 07/26/2019     Lab Results   Component Value Date    ALT 14 07/26/2019    AST 27 07/26/2019    ALKPHOS 88 07/26/2019    BILITOT 0.1 07/26/2019     Blood cultures 7/25/19     Staphylococcus aureus     CULTURE, BLOOD     Clindamycin <=0.5 mcg/mL Sensitive     Erythromycin <=0.5 mcg/mL Sensitive     Oxacillin <=0.25 mcg/mL Sensitive     Penicillin 2 mcg/mL Resistant     Tetracycline <=4 mcg/mL Sensitive     Trimeth/Sulfa <=0.5/9.5 m... Sensitive         Methicillin resistant staphylococcus aureus     CULTURE, AEROBIC  (SPECIFY SOURCE)     Clindamycin <=0.5 mcg/mL Sensitive     Erythromycin <=0.5 mcg/mL Sensitive     Oxacillin 0.5 mcg/mL Resistant     Penicillin 2 mcg/mL Resistant     Tetracycline <=4 mcg/mL Sensitive     Trimeth/Sulfa <=0.5/9.5 m... Sensitive     Vancomycin 1 mcg/mL Sensitive        TRANSITION OF CARE:     Transition of Care  Visit:     I have reviewed and updated the history and problem list.  I have reconciled the medication list.  I have discussed the hospitalization and current medical issues, prognosis and plans with the patient/family.  I  spent more than 50% of time discussing the care with the patient/family.  Total Face-to-Face Encounter in the [facility]: 60 minutes.    Medications Reconciliation:   I have reconciled the patient's home medications and discharge medications with the patient/family. I have updated all changes.  Refer to After-Visit Medication List.    ASSESSMENT & PLAN:     MSSA (methicillin susceptible Staphylococcus aureus) infection  MRSA infection  Retropharyngeal abscess 7/19/19 I+D  Neck pain  History of fusion of cervical spine 2006  - History of cervical spinal fusion 2006  - MSSA infection 7-19-19: ENT: Anterior approach for evacuation of retropharyngeal, prevertebral abscess and exploration of C5-C6 hardware.  - History of MRSA and eloping.    - Patient had positive blood culture recently for MSSA and MRSA.    He was started on Bactrim DS 2 BID. Tolerating well. No fever or chill.      He does have a lot of neuropathic pain. He lost his gabapentin along with Bactrim 2 days ago.    He finished 2 weeks of abx but lost his 2nd bottle on Saturday. Missed last 3-4 doses.    - He has Neurosurgery appointment next week.    Will refill one month of Bactrim DS high dose. Check labs today to monitor renal function and his inflammatory markers.    Arrange follow up with ID outpatient.    Plan:  -     Comprehensive metabolic panel; Future; Expected date: 08/12/2019  -     CBC auto differential; Future; Expected date: 08/12/2019  -     Sedimentation rate; Future; Expected date: 08/12/2019  -     C-reactive protein; Future; Expected date: 08/12/2019  -     Procalcitonin; Future; Expected date: 08/12/2019  -     sulfamethoxazole-trimethoprim 800-160mg (BACTRIM DS) 800-160 mg Tab; Take 2 tablets by mouth 2 (two)  times daily.  Dispense: 112 tablet; Refill: 0  -     gabapentin (NEURONTIN) 800 MG tablet; Take 1 tablet (800 mg total) by mouth 3 (three) times daily.  Dispense: 90 tablet; Refill: 2  -     Ambulatory consult to Infectious Disease    - Letter given to the patient to postpone court attendance until after NS and ID appointments.    Substance use disorder IVDU  Nicotine dependence, cigarettes, uncomplicated    Instructions for the patient:  MRSA [place] Eradication:   Daily 15-minute dilute bleach water soaks (one-fourth cup of bleach per full bathtub of water) for five days.   Or wipe with water with small amount of bleach (1 cap with a bucket of water) daily.    Scheduled Follow-up :  Future Appointments   Date Time Provider Department Center   8/20/2019  1:15 PM Travis Rivera Jr., MD [facility]   8/30/2019 11:00 AM Torito Lu MD [facility] ID [facility]       After Visit Medication List :     Medication List           Accurate as of 8/12/19  4:44 PM. If you have any questions, ask your nurse or doctor.               START taking these medications    doxycycline 100 MG Cap  Commonly known as:  VIBRAMYCIN  Take 1 capsule (100 mg total) by mouth 2 (two) times daily. for 7 days  Started by:  Malik Walker MD        CHANGE how you take these medications    gabapentin 800 MG tablet  Commonly known as:  NEURONTIN  Take 1 tablet (800 mg total) by mouth 3 (three) times daily.  What changed:    · medication strength  · how much to take  Changed by:  Malik Walker MD        CONTINUE taking these medications    acetaminophen 500 MG tablet  Commonly known as:  TYLENOL  Take 2 tablets (1,000 mg total) by mouth daily as needed for Pain.     sulfamethoxazole-trimethoprim 800-160mg 800-160 mg Tab  Commonly known as:  BACTRIM DS  Take 2 tablets by mouth 2 (two) times daily.        STOP taking these medications    diazePAM 5 MG tablet  Commonly known as:  VALIUM  Stopped by:  Malik Walker MD           Where  to Get Your Medications      These medications were sent to [id number] - [place], [place] - [address]  [address], [place] [address]    Phone:  [phone]   · doxycycline 100 MG Cap  · gabapentin 800 MG tablet  · sulfamethoxazole-trimethoprim 800-160mg 800-160 mg Tab         PS. Patient has insurance issues. Cannot get Bactrim refilled until 3 days later         Will Rx Doxycycline 100 mg twice daily for 7 days for bridging.    Signing Physician:  Malik Walker MD

## 2019-08-12 NOTE — Clinical Note
[facility] Visit (Post Discharge Follow-up) Today: - Our clinic physicians and nurses plan to follow the patient up for any medical issues in the [facility] for 30 days post discharge.Future Appointments:Future Appointments8/12/2019  2:00 PM    LAB, APPOINTMENT [facility]* [facility] LAB IM    [facility] PCW 8/20/2019  1:15 PM    Travis Rivera Jr., * [facility] 8/30/2019  11:00 AM   Torito Lu MD      [facility] ID        [facility]

## 2019-08-12 NOTE — PATIENT INSTRUCTIONS
MRSA [place] Eradication:   Daily 15-minute dilute bleach water soaks (one-fourth cup of bleach per full bathtub of water) for five days.   Or wipe with water with small amount of bleach (1 cap with a bucket of water) daily.

## 2019-08-12 NOTE — TELEPHONE ENCOUNTER
----- Message from Shey Gasca sent at 8/12/2019  4:27 PM CDT -----  Contact: Patient [phone]  Patient stated that the pharmacy will not fill Rx sulfamethoxazole-trimethoprim 800-160mg (BACTRIM DS) 800-160 mg Tab.    Please contact the pharmacy [id number] - [place], [place] - [address] [facility] [phone]      Please call and advise.    Thank You

## 2019-08-12 NOTE — TELEPHONE ENCOUNTER
Spoke with Chon and the medication Bactrim -160 mg tab is on hold for 3 more days because of insurance purposes. To pay out of pocket will cost 100.00 dollars. Spoke with patient and Doctor Aaron and he sent another antibiotic to the pharmacy. Doctor will call patient tomorrow to see if he received medication.

## 2019-08-13 ENCOUNTER — TELEPHONE (OUTPATIENT)
Dept: FAMILY MEDICINE | Facility: CLINIC | Age: 39
End: 2019-08-13

## 2019-08-13 ENCOUNTER — TELEPHONE (OUTPATIENT)
Dept: INTERNAL MEDICINE | Facility: CLINIC | Age: 39
End: 2019-08-13

## 2019-08-13 NOTE — TELEPHONE ENCOUNTER
Pt says he doesn't have any gas money to get his lab work done. He doesn't have any money to get the neck brace from over the counter he's having a lot of pain.Or gas to go to the ER.

## 2019-08-13 NOTE — TELEPHONE ENCOUNTER
----- Message from Paolo Benitoaux sent at 8/13/2019 12:46 PM CDT -----  Contact: Self  Type: Patient Call Back    Who called:self    What is the request in detail: Please contact the patient to address  his concerns. He is in severe pain and would like to know what to do .    Can the clinic reply by [facility]? no  Would the patient rather a call back or a response via [facility]? call    Best call back number: [phone]

## 2019-08-14 ENCOUNTER — TELEPHONE (OUTPATIENT)
Dept: INTERNAL MEDICINE | Facility: CLINIC | Age: 39
End: 2019-08-14

## 2019-08-14 DIAGNOSIS — J39.0 RETROPHARYNGEAL ABSCESS: ICD-10-CM

## 2019-08-14 DIAGNOSIS — F41.9 ANXIETY: Primary | ICD-10-CM

## 2019-08-14 DIAGNOSIS — J39.0 RETROPHARYNGEAL ABSCESS: Primary | ICD-10-CM

## 2019-08-14 DIAGNOSIS — M54.2 NECK PAIN, BILATERAL: ICD-10-CM

## 2019-08-14 RX ORDER — METHOCARBAMOL 750 MG/1
750 TABLET, FILM COATED ORAL 4 TIMES DAILY PRN
Qty: 40 TABLET | Refills: 0 | Status: SHIPPED | OUTPATIENT
Start: 2019-08-14 | End: 2019-08-24

## 2019-08-14 RX ORDER — DIAZEPAM 5 MG/1
5 TABLET ORAL 2 TIMES DAILY
Qty: 30 TABLET | Refills: 0 | Status: SHIPPED | OUTPATIENT
Start: 2019-08-14 | End: 2019-10-09

## 2019-08-14 NOTE — TELEPHONE ENCOUNTER
----- Message from Radamesshane Santizo sent at 8/14/2019  4:03 PM CDT -----  Contact: Self  Type: RX Refill Request    Who Called: self    Refill or New Rx: refill    RX Name and Strength: diazePAM (VALIUM) 5 MG tablet        Preferred Pharmacy with phone number : [id number] - [place], [place] - [address] [facility] [phone] (Phone)  [phone] (Fax)        Local or Mail Order: local    Ordering Provider: Dr. Méndez    Would the patient rather a call back or a response via [facility]? call    Best Call Back Number: [phone]

## 2019-08-14 NOTE — TELEPHONE ENCOUNTER
Spoke with the pt and he is having panic attacks and crying spells.  Patient right arm is in pain.  Patient can't sleep.  He has an appointment with  and he don't know how he will get into the car.  Patient states his pain level from 1 to 10, is a 12. Please adivse

## 2019-08-14 NOTE — TELEPHONE ENCOUNTER
"Informed pt that gabapentin was prescribed a few days ago. Pt stated he is bedridden and can't take the pain. Stated he can't get in the car. Advised pt per Dr. Walker if the pain was too severe for pt to go to the ER. Pt stated "again I can't get in the car i'm laying flat on my back right now.  "

## 2019-08-14 NOTE — TELEPHONE ENCOUNTER
----- Message from Marlene Munoz sent at 8/14/2019  9:47 AM CDT -----  Contact: Patient [phone]  Pt states that he is calling to speak with someone in your office. He states that he is calling in regards to his appt to the neurologist. He is requesting something be prescribed for pain or a muscle relaxer. Pt states that he is in severe pain and he is stuck just laying on his back and can barely move. Pt states that he can barely stand. He can't eat or sleep and he is having panic attacks. He states that he can't even use a cup or spoon. He also states that the pain gets so bad that all he can do is cry. He states that if need be his wife can come in and  a prescription but he can't move to do anything.Please call back and advise.       Thanks

## 2019-08-14 NOTE — TELEPHONE ENCOUNTER
----- Message from Annemarie Campos sent at 8/14/2019  3:50 PM CDT -----  Contact: Patient ph [phone]  Type: Patient Call Back    Who called: Patient    What is the request in detail: Patient states he's bedridden, in extreme pain, and can't take the pain. Would like to know if he can call something in for panic attacks, because he's not sleeping at all. Also need methocarbamol (ROBAXIN) 750 MG Tab refilled. Asking to speak to nurse or dr about this.   .    [facility] [address] [place] [address] [facility] [address]  [address] [address] [address]  Phone: [phone] Fax: [phone]    Would the patient rather a call back or a response via [facility]? Call back    Best call back number: [phone]

## 2019-08-15 ENCOUNTER — TELEPHONE (OUTPATIENT)
Dept: FAMILY MEDICINE | Facility: CLINIC | Age: 39
End: 2019-08-15

## 2019-08-15 RX ORDER — OXYCODONE AND ACETAMINOPHEN 10; 325 MG/1; MG/1
1 TABLET ORAL EVERY 8 HOURS PRN
Qty: 30 TABLET | Refills: 0 | Status: SHIPPED | OUTPATIENT
Start: 2019-08-15 | End: 2019-10-04

## 2019-08-15 RX ORDER — OXYCODONE AND ACETAMINOPHEN 10; 325 MG/1; MG/1
1 TABLET ORAL EVERY 8 HOURS PRN
Qty: 30 TABLET | Refills: 0 | Status: SHIPPED | OUTPATIENT
Start: 2019-08-15 | End: 2019-08-15 | Stop reason: SDUPTHER

## 2019-08-15 NOTE — TELEPHONE ENCOUNTER
"Patient called stating that he is in severe pain that is "off the pain chart". States that he is taking 2 tylenol, 2-3 robaxin, 2 valium and 3 gabapentin approx every 4 hours and only getting minimal relief. Reports that his pain is so severe that he cannot sit upright and is causing him panic  attacks, anxiety, uncontrolled hollering and crying and hyperventilation. Dr. Méndez notified and states that he will call him in some pain medication. Patient notified.   "

## 2019-08-15 NOTE — TELEPHONE ENCOUNTER
----- Message from Milan Freitas sent at 8/15/2019  5:01 PM CDT -----  Contact: Pt  Pt needs a refill on oxyCODONE-acetaminophen (PERCOCET)  mg per tablet called into pharmacy at [facility] - [place], [place] - [address] [facility] [address].     Pt can be reached at [phone].    Thank You.

## 2019-08-16 ENCOUNTER — TELEPHONE (OUTPATIENT)
Dept: FAMILY MEDICINE | Facility: CLINIC | Age: 39
End: 2019-08-16

## 2019-08-16 DIAGNOSIS — J39.0 RETROPHARYNGEAL ABSCESS: Primary | ICD-10-CM

## 2019-08-16 DIAGNOSIS — Z98.1 HISTORY OF FUSION OF CERVICAL SPINE: ICD-10-CM

## 2019-08-16 RX ORDER — TRAMADOL HYDROCHLORIDE 50 MG/1
50 TABLET ORAL EVERY 6 HOURS
Qty: 30 TABLET | Refills: 0 | Status: SHIPPED | OUTPATIENT
Start: 2019-08-16 | End: 2019-09-17 | Stop reason: SDUPTHER

## 2019-08-16 NOTE — TELEPHONE ENCOUNTER
"Patient reports that both arms are having severe numbness with burning sensation in right arm. States he cant stand up to long without his right arm "giving out." feels like his shoulders are bruised. Did stated that one week ago when his mother was bring him food that when he tried to step outside, he sipping on the three stairs of his mobile home landing on his butt as he slipped. Patient was advised that percocet was sent to the pharmacy on yesterday. Did  Rx. [facility] has an new opoid rule where they can give patients an week supply at a time. Patient also asked for tramadol. That was also send to pharmacy. Informed patient to please keep scheduled appts with pain mgmt and ID. Verbally understands.  "

## 2019-08-16 NOTE — PROGRESS NOTES
Was supposed to see neurosurgery in follow up, I did not see any upcoming appointments, referral submitted. C/o R arm pain/numbness/weakness    Staff talked to pt. Taking percocet requesting tramadol for supplement, has upcoming pain mgmt consultation. Tramadol rx'd.

## 2019-08-20 ENCOUNTER — OFFICE VISIT (OUTPATIENT)
Dept: PAIN MEDICINE | Facility: CLINIC | Age: 39
End: 2019-08-20
Payer: MEDICARE

## 2019-08-20 VITALS
OXYGEN SATURATION: 97 % | DIASTOLIC BLOOD PRESSURE: 107 MMHG | SYSTOLIC BLOOD PRESSURE: 145 MMHG | HEART RATE: 96 BPM | HEIGHT: 69 IN | BODY MASS INDEX: 17.92 KG/M2 | WEIGHT: 121 LBS

## 2019-08-20 DIAGNOSIS — J39.0 RETROPHARYNGEAL ABSCESS: ICD-10-CM

## 2019-08-20 DIAGNOSIS — Z98.1 HISTORY OF FUSION OF CERVICAL SPINE: ICD-10-CM

## 2019-08-20 DIAGNOSIS — M54.2 NECK PAIN: ICD-10-CM

## 2019-08-20 DIAGNOSIS — M46.20 SPINAL ABSCESS: ICD-10-CM

## 2019-08-20 DIAGNOSIS — F19.90 SUBSTANCE USE DISORDER: Primary | ICD-10-CM

## 2019-08-20 DIAGNOSIS — M54.12 CERVICAL RADICULOPATHY: ICD-10-CM

## 2019-08-20 LAB — FUNGUS SPEC CULT: NORMAL

## 2019-08-20 PROCEDURE — 99204 PR OFFICE/OUTPT VISIT, NEW, LEVL IV, 45-59 MIN: ICD-10-PCS | Mod: S$GLB,,, | Performed by: PAIN MEDICINE

## 2019-08-20 PROCEDURE — 3080F PR MOST RECENT DIASTOLIC BLOOD PRESSURE >= 90 MM HG: ICD-10-PCS | Mod: S$GLB,,, | Performed by: PAIN MEDICINE

## 2019-08-20 PROCEDURE — 99999 PR PBB SHADOW E&M-EST. PATIENT-LVL III: CPT | Mod: PBBFAC,,, | Performed by: PAIN MEDICINE

## 2019-08-20 PROCEDURE — 3080F DIAST BP >= 90 MM HG: CPT | Mod: S$GLB,,, | Performed by: PAIN MEDICINE

## 2019-08-20 PROCEDURE — 99204 OFFICE O/P NEW MOD 45 MIN: CPT | Mod: S$GLB,,, | Performed by: PAIN MEDICINE

## 2019-08-20 PROCEDURE — 3077F PR MOST RECENT SYSTOLIC BLOOD PRESSURE >= 140 MM HG: ICD-10-PCS | Mod: S$GLB,,, | Performed by: PAIN MEDICINE

## 2019-08-20 PROCEDURE — 3077F SYST BP >= 140 MM HG: CPT | Mod: S$GLB,,, | Performed by: PAIN MEDICINE

## 2019-08-20 PROCEDURE — 99999 PR PBB SHADOW E&M-EST. PATIENT-LVL III: ICD-10-PCS | Mod: PBBFAC,,, | Performed by: PAIN MEDICINE

## 2019-08-20 PROCEDURE — 3008F BODY MASS INDEX DOCD: CPT | Mod: S$GLB,,, | Performed by: PAIN MEDICINE

## 2019-08-20 PROCEDURE — 3008F PR BODY MASS INDEX (BMI) DOCUMENTED: ICD-10-PCS | Mod: S$GLB,,, | Performed by: PAIN MEDICINE

## 2019-08-20 NOTE — PROGRESS NOTES
Subjective:     Patient ID: Marcial Thomas is a 39 y.o. male    Chief Complaint: Neck Pain (pt takes medication and has had injections in the past )      Referred by: Ciro Tolentino MD      HPI:    Initial Encounter (8/20/19):  Marcial Thomas is a 39 y.o. male with history IV drug abuse, opioid abuse who presents today with severe neck pain and right upper extremity pain/numbness/tingling.  Patient recently hospitalized for a retropharyngeal abscess and is status post incision and drainage.  According to the record, patient a low but from this hospitalization, but patient states it was simply misunderstanding.  He states that he thought he could leave after receiving his Bactrim and so this is what he did.  He was readmitted shortly after.  He has been followed by infectious disease who felt as the outpatient oral antibiotics was appropriate. Today, patient reports that his neck and right upper extremity pain are persistent and quite severe.  He denies any fevers, chills, night sweats.  He states that there is no active drainage related to his previous incision and drainage procedure. This pain is described in detail below.    Physical Therapy:  Not applicable    Non-pharmacologic Treatment:  Nothing helps         · TENS?  No    Pain Medications:         · Currently taking:  Percocet, gabapentin, tramadol, Robaxin, Tylenol, Valium    · Has tried in the past:      · Has not tried:  , NSAIDs, TCAs, SNRIs, topical creams    Blood thinners:  None    Interventional Therapies:  None    Relevant Surgeries:  Previous cervical fusion    Affecting sleep?  Yes    Affecting daily activities? yes    Depressive symptoms? yes          · SI/HI? No    Work status: Umemployed    Pain Scores:    Best:     10/10  Worst:    10/10  Usually:  10/10  Today:  10/10    Review of Systems   Constitutional: Negative for activity change, appetite change, chills, fatigue, fever and unexpected weight change.   HENT: Negative for  hearing loss.    Eyes: Negative for visual disturbance.   Respiratory: Negative for chest tightness and shortness of breath.    Cardiovascular: Negative for chest pain.   Gastrointestinal: Negative for abdominal pain, constipation, diarrhea, nausea and vomiting.   Genitourinary: Negative for difficulty urinating.   Musculoskeletal: Positive for myalgias, neck pain and neck stiffness. Negative for back pain and gait problem.   Skin: Negative for rash.   Neurological: Positive for weakness and numbness. Negative for dizziness, light-headedness and headaches.   Psychiatric/Behavioral: Positive for sleep disturbance. Negative for hallucinations and suicidal ideas. The patient is nervous/anxious.        Past Medical History:   Diagnosis Date    Abscess, prevertebral soft tissue of neck 7/17/2019 7/16/2019 CT neck done at  Large fluid collection with a few foci of gas centered in the prevertebral soft tissues anterior to the C3-C7 levels, highly suspicious for prevertebral abscess.  The larynx is displaced anteriorly. Anterior fusion of C5-C6. Findings were discussed with Ramiro Carrasco MD on 7/16/2019 at 1530 hours.  Additional contrast enhanced CT images of the neck were recommended.    Chronic neck pain     Lumbar radiculopathy 7/2016 MRI with degeneration, mild central stenosis L3-4 2/8/2019 7/7/16 MRI L spine IMPRESSION:  1.  Mild discogenic predominant degenerative changes of the lower lumbar spine with mild central canal stenosis at L3-4. 2. Annular fissures at L3-4 and L4-5.     MRSA infection 8/12/2019    MSSA (methicillin susceptible Staphylococcus aureus) infection 7/25/2019    Nodule of left lobe of thyroid gland incidental on CT 7/2019 8 mm rec's thyroid US 7/18/2019    Opioid use disorder 2/8/2019    Retropharyngeal abscess 7/19/19 I+D 7/18/2019 7-  history of cervical fusion C5-6, IV drug abuse, neuromuscular disorder, hypertension, anxiety who presents with retropharyngeal  abscess.  Evaluated by Neurosurgery and ENT as he still has hardware in his neck.  Patient went for I&D 7/19 with ENT. Surgical cultures grew Staph. Neurosurgery not recommending any acute intervention.  Blood cultures grew MSSA.  Was on vancomycin and continuo       Past Surgical History:   Procedure Laterality Date    cervicle fusion      EPIDURAL BLOCK INJECTION      lumbar x 2    INCISION AND DRAINAGE, ABSCESS, prevertebral Right 7/19/2019    Performed by Kole Cornelius MD at [facility] OR [address] [place] [address]       Social History     Socioeconomic History    Marital status:      Spouse name: Not on file    Number of children: Not on file    Years of education: Not on file    Highest education level: Not on file   Occupational History    Occupation: side jobs/piece work.     Social Needs    Financial resource strain: Not on file    Food insecurity:     Worry: Not on file     Inability: Not on file    Transportation needs:     Medical: Not on file     Non-medical: Not on file   Tobacco Use    Smoking status: Current Every Day Smoker     Packs/day: 1.00     Types: Cigarettes    Smokeless tobacco: Former User   Substance and Sexual Activity    Alcohol use: No    Drug use: Yes     Types: IV     Comment: pain pills and IV Heroin this am    Sexual activity: Yes     Partners: Female   Lifestyle    Physical activity:     Days per week: Not on file     Minutes per session: Not on file    Stress: Not on file   Relationships    Social connections:     Talks on phone: Not on file     Gets together: Not on file     Attends Jew service: Not on file     Active member of club or organization: Not on file     Attends meetings of clubs or organizations: Not on file     Relationship status: Not on file   Other Topics Concern    Not on file   Social History Narrative    He does side jobs.    History of IVDU           Review of patient's allergies indicates:   Allergen Reactions    Vicodin  "[hydrocodone-acetaminophen] Hives       Current Outpatient Medications on File Prior to Visit   Medication Sig Dispense Refill    acetaminophen (TYLENOL) 500 MG tablet Take 2 tablets (1,000 mg total) by mouth daily as needed for Pain.  0    gabapentin (NEURONTIN) 800 MG tablet Take 1 tablet (800 mg total) by mouth 3 (three) times daily. 90 tablet 2    methocarbamol (ROBAXIN) 750 MG Tab Take 1 tablet (750 mg total) by mouth 4 (four) times daily as needed. 40 tablet 0    oxyCODONE-acetaminophen (PERCOCET)  mg per tablet Take 1 tablet by mouth every 8 (eight) hours as needed for Pain. > 7 day rx, medically necessary 30 tablet 0    sulfamethoxazole-trimethoprim 800-160mg (BACTRIM DS) 800-160 mg Tab Take 2 tablets by mouth 2 (two) times daily. 112 tablet 0    traMADol (ULTRAM) 50 mg tablet Take 1 tablet (50 mg total) by mouth every 6 (six) hours. Medically necessary to be > 7 day supply 30 tablet 0    diazePAM (VALIUM) 5 MG tablet Take 1 tablet (5 mg total) by mouth 2 (two) times daily. 30 tablet 0    [] doxycycline (VIBRAMYCIN) 100 MG Cap Take 1 capsule (100 mg total) by mouth 2 (two) times daily. for 7 days 14 capsule 0     No current facility-administered medications on file prior to visit.        Objective:      BP (!) 145/107   Pulse 96   Ht 5' 9" (1.753 m)   Wt 54.9 kg (121 lb)   SpO2 97%   BMI 17.87 kg/m²     Exam:  GEN:  Well developed, well nourished.  No acute distress.   HEENT:  No trauma.  Mucous membranes moist.  Nares patent bilaterally.  PSYCH: Normal affect. Thought content appropriate.  CHEST:  Breathing symmetric.  No audible wheezing.  ABD: Soft, non-distended.  SKIN:  Warm, pink, dry.  No rash on exposed areas.    EXT:  No cyanosis, clubbing, or edema.  No color change or changes in nail or hair growth.  NEURO/MUSCULOSKELETAL:  Fully alert, oriented, and appropriate. Speech normal roge. No cranial nerve deficits.   Gait:  Antalgic.  No focal motor deficits.     No " tenderness to posterior cervical spine  Dressing in place to right anterior cervical region where prior I and D was done; clean dry and intact    Imaging:  Narrative     EXAMINATION:  MRI CERVICAL SPINE WITHOUT CONTRAST    CLINICAL HISTORY:  Neck pain, prior xray, abn neuro exam;    FINDINGS:  There is C5-C6 ACDF with a plate and vertebral body screws.  There is significant motion artifact.  A saturation band obscures the previously seen right-sided retropharyngeal collection.  At C6-C7 there is a collection that flattens the right anterolateral thecal sac.  Remaining levels are unremarkable.   Impression       Retropharyngeal collection/abscess not seen on today's study due to the placement of a saturation band over this location.    The collection seen flattening the anterior thecal sac at C6-C7 and just below that this plan level appears to be fluid in nature possibly hematoma seroma.  An abscess collection cannot be excluded.      Electronically signed by: Roberto Cartagena MD  Date: 07/25/2019  Time: 12:46    Encounter     View Encounter                   Assessment:       Encounter Diagnoses   Name Primary?    Substance use disorder IVDU Yes    History of fusion of cervical spine 2006     Retropharyngeal abscess 7/19/19 I+D     Neck pain     Cervical radiculopathy     Spinal abscess          Plan:       Marcial was seen today for neck pain.    Diagnoses and all orders for this visit:    Substance use disorder IVDU    History of fusion of cervical spine 2006    Retropharyngeal abscess 7/19/19 I+D    Neck pain    Cervical radiculopathy    Spinal abscess        Marcial Thomas is a 39 y.o. male with severe right-sided neck and upper extremity pain.  History IV drug abuse and recent retropharyngeal abscess status post incision and drainage.  MRI from July 25th shows epidural fluid collection in the cervical spine and abscess cannot be ruled out.  Patient currently taking oral antibiotics.  Not having overt  signs of sepsis.    1.  Pertinent imaging studies reviewed by me. Imaging results were discussed with patient.  2.  With a very lengthy discussion about the severity of his condition. I stated that it is imperative that he is adequate treatment for any potential epidural abscess that may be present.  Patient expresses severe anxiety regarding further medical treatment.  I advised him to present to the emergency room as this would be the most expeditous way to be evaluated by Neurosurgery for this problem.  Patient expressed understanding but stated that he would need some time to mentally prepare for this.  I once again stressed that time is of the essence with a problem like this and that the sooner he goes the better.  3.  I do not feel as though it is appropriate to utilize opioid pain medications at this time for multiple reasons.  Chief among these is patient has history of addiction and IV drug abuse.  Patient does have history of buying opioid pain medications off of the street as well as a recent relapse of IV drug abuse.  Given patient's history of addiction he is high risk for potential overdose with outpatient oral medications.  It may be appropriate to utilize opioid pain medications in the outpatient setting where the use of these medications can be monitored/controlled..  Also, it is imperative that any progressive neurologic symptoms/pain be masked by the concurrent use of strong opioid pain medications. I explained my concerns with the patient and his mother and they both expressed understanding and agreement.  4.  No interventional procedures at this time given possible ongoing epidural abscess.  I will also be fairly reluctant to perform interventional procedures for fear of infection in the future.  5.  Patient is already taking the appropriate adjuvant medications including gabapentin 800 mg t.i.d..  It is unlikely that titrating this medication or other adjuvant medications will likely make a  large improvement in his symptoms on less more definitive treatment is undertaken.  6.  Return to clinic as needed.  While patient does have significant pathology that is likely very painful, I think it is more important to ensure that his addiction is adequately treated.  Psychiatry or addiction medicine evaluation would likely be of benefit.

## 2019-08-20 NOTE — LETTER
August 20, 2019      Ciro Tolentino MD  [address]  [place] [address]           [facility]  [address], [place] [address]  Phone: [phone]  Fax: [phone]          Patient: Marcial Thomas   MR Number: [id number]   YOB: 1980   Date of Visit: 8/20/2019       Dear Dr. Ciro Tolentino:    Thank you for referring Marcial Thomas to me for evaluation. Attached you will find relevant portions of my assessment and plan of care.    If you have questions, please do not hesitate to call me. I look forward to following Marcial Thomas along with you.    Sincerely,    Travis Rivera Jr., MD    Enclosure  CC:  No Recipients    If you would like to receive this communication electronically, please contact [email or web address] or [phone] to request more information on [employer] Link access.    For providers and/or their staff who would like to refer a patient to [facility], please contact us through our one-stop-shop provider referral line, [facility], at [phone].    If you feel you have received this communication in error or would no longer like to receive these types of communications, please e-mail [email or web address]

## 2019-08-21 ENCOUNTER — TELEPHONE (OUTPATIENT)
Dept: FAMILY MEDICINE | Facility: CLINIC | Age: 39
End: 2019-08-21

## 2019-08-21 NOTE — TELEPHONE ENCOUNTER
----- Message from Josy Whitman sent at 8/21/2019  9:29 AM CDT -----  Contact: Self   Type: Patient Call Back    What is the request in detail: Pt calling to speak to a nurse regarding spin and neck  pain. Pt states that he has so much pain that he can mika it any long and if the dr can admit him.     Can the clinic reply by [facility]? No    Would the patient rather a call back or a response via [facility]? Call back     Best call back number: [phone]

## 2019-08-21 NOTE — TELEPHONE ENCOUNTER
Patient states he can not sit upright and refuses to go to ED, requesting to be admitted to hospital. Please advise

## 2019-08-21 NOTE — TELEPHONE ENCOUNTER
Phone call placed to patient with charted recommendation that provider does not have admitting privileges and he will need to go to the emergency room. Patient verbalized understanding.

## 2019-08-21 NOTE — TELEPHONE ENCOUNTER
I can't admit him. I don't have admitting privileges.  He went to pain mgmt yesterday - he really recommended he get admitted for eval and monitoring of the abscess.  I would recommend he go to the ER.    Pain mgmt recommended against narcotics because they can mask his symptoms and hide the severity of his illness.

## 2019-09-05 ENCOUNTER — TELEPHONE (OUTPATIENT)
Dept: FAMILY MEDICINE | Facility: CLINIC | Age: 39
End: 2019-09-05

## 2019-09-05 NOTE — TELEPHONE ENCOUNTER
----- Message from Javad Méndez MD sent at 9/5/2019  8:33 AM CDT -----  Regarding: pt no showed appt with infectious disease  Please call pt - he no showed his appointment with infectious disease. He needs to reschedule.

## 2019-09-09 ENCOUNTER — TELEPHONE (OUTPATIENT)
Dept: NEUROSURGERY | Facility: CLINIC | Age: 39
End: 2019-09-09

## 2019-09-09 NOTE — TELEPHONE ENCOUNTER
----- Message from David Apple MD sent at 9/9/2019  3:49 PM CDT -----  Contact: Marcial Thomas      ----- Message -----  From: Milla Acosta  Sent: 9/9/2019   1:49 PM  To: David Apple MD    No. [phone] Patient called to schedule a NP appointment due to neck pain from a previous surgery and would like first available.

## 2019-09-10 ENCOUNTER — TELEPHONE (OUTPATIENT)
Dept: NEUROSURGERY | Facility: CLINIC | Age: 39
End: 2019-09-10

## 2019-09-10 NOTE — TELEPHONE ENCOUNTER
----- Message from Leann Powell sent at 9/10/2019  8:01 AM CDT -----  No. [phone]   New Patient needs an appointment as soon as possible at any location.   He is in severe pain and almost bed ridden.  He sleeps about 1 hour a night, and he is not eating.   Please call.

## 2019-09-16 ENCOUNTER — HOSPITAL ENCOUNTER (OUTPATIENT)
Dept: RADIOLOGY | Facility: HOSPITAL | Age: 39
Discharge: HOME | End: 2019-09-16
Attending: NEUROLOGICAL SURGERY
Payer: MEDICARE

## 2019-09-16 ENCOUNTER — OFFICE VISIT (OUTPATIENT)
Dept: NEUROSURGERY | Facility: CLINIC | Age: 39
End: 2019-09-16
Payer: MEDICARE

## 2019-09-16 VITALS
RESPIRATION RATE: 18 BRPM | BODY MASS INDEX: 18.68 KG/M2 | HEART RATE: 90 BPM | DIASTOLIC BLOOD PRESSURE: 97 MMHG | HEIGHT: 69 IN | SYSTOLIC BLOOD PRESSURE: 140 MMHG | WEIGHT: 126.13 LBS

## 2019-09-16 DIAGNOSIS — M54.12 CERVICAL RADICULOPATHY: ICD-10-CM

## 2019-09-16 DIAGNOSIS — F19.90 SUBSTANCE USE DISORDER: ICD-10-CM

## 2019-09-16 DIAGNOSIS — J39.0 RETROPHARYNGEAL ABSCESS: ICD-10-CM

## 2019-09-16 DIAGNOSIS — Z98.1 HISTORY OF FUSION OF CERVICAL SPINE: ICD-10-CM

## 2019-09-16 DIAGNOSIS — M54.12 CERVICAL RADICULOPATHY: Primary | ICD-10-CM

## 2019-09-16 PROCEDURE — 99024 POSTOP FOLLOW-UP VISIT: CPT | Mod: S$GLB,,, | Performed by: NEUROLOGICAL SURGERY

## 2019-09-16 PROCEDURE — 99024 PR POST-OP FOLLOW-UP VISIT: ICD-10-PCS | Mod: S$GLB,,, | Performed by: NEUROLOGICAL SURGERY

## 2019-09-16 PROCEDURE — 72050 X-RAY EXAM NECK SPINE 4/5VWS: CPT | Mod: 26,,, | Performed by: RADIOLOGY

## 2019-09-16 PROCEDURE — 99999 PR PBB SHADOW E&M-EST. PATIENT-LVL IV: ICD-10-PCS | Mod: PBBFAC,,, | Performed by: NEUROLOGICAL SURGERY

## 2019-09-16 PROCEDURE — 72050 X-RAY EXAM NECK SPINE 4/5VWS: CPT | Mod: TC,FY

## 2019-09-16 PROCEDURE — 72050 XR CERVICAL SPINE AP LAT WITH FLEX EXTEN: ICD-10-PCS | Mod: 26,,, | Performed by: RADIOLOGY

## 2019-09-16 PROCEDURE — 99999 PR PBB SHADOW E&M-EST. PATIENT-LVL IV: CPT | Mod: PBBFAC,,, | Performed by: NEUROLOGICAL SURGERY
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
## 2019-09-16 NOTE — LETTER
September 17, 2019      Javad Méndez MD  [address]  [facility] [address]           [facility]  [address] [facility] [address]  [facility] [address]  Phone: [phone]  Fax: [phone]          Patient: Marcila Thomas   MR Number: [id number]   YOB: 1980   Date of Visit: 9/16/2019       Dear Dr. Javad Méndez:    Thank you for referring Marcial Thomas to me for evaluation. Attached you will find relevant portions of my assessment and plan of care.    If you have questions, please do not hesitate to call me. I look forward to following Marcial Thomas along with you.    Sincerely,    Rajan Bowers, DO    Enclosure  CC:  No Recipients    If you would like to receive this communication electronically, please contact [email or web address] or [phone] to request more information on [facility] Link access.    For providers and/or their staff who would like to refer a patient to [facility], please contact us through our one-stop-shop provider referral line, [facility] , at [phone].    If you feel you have received this communication in error or would no longer like to receive these types of communications, please e-mail [email or web address]

## 2019-09-17 ENCOUNTER — TELEPHONE (OUTPATIENT)
Dept: NEUROSURGERY | Facility: CLINIC | Age: 39
End: 2019-09-17

## 2019-09-17 DIAGNOSIS — A49.02 MRSA INFECTION: ICD-10-CM

## 2019-09-17 DIAGNOSIS — M54.2 NECK PAIN: ICD-10-CM

## 2019-09-17 DIAGNOSIS — J39.0 RETROPHARYNGEAL ABSCESS: ICD-10-CM

## 2019-09-17 DIAGNOSIS — F41.9 ANXIETY: ICD-10-CM

## 2019-09-17 DIAGNOSIS — M54.12 CERVICAL RADICULOPATHY: Primary | ICD-10-CM

## 2019-09-17 DIAGNOSIS — M40.202 KYPHOSIS OF CERVICAL REGION, UNSPECIFIED KYPHOSIS TYPE: ICD-10-CM

## 2019-09-17 DIAGNOSIS — A49.01 MSSA (METHICILLIN SUSCEPTIBLE STAPHYLOCOCCUS AUREUS) INFECTION: ICD-10-CM

## 2019-09-17 DIAGNOSIS — Z98.1 HISTORY OF FUSION OF CERVICAL SPINE: ICD-10-CM

## 2019-09-17 RX ORDER — DIAZEPAM 5 MG/1
5 TABLET ORAL 2 TIMES DAILY
Qty: 30 TABLET | Refills: 0 | OUTPATIENT
Start: 2019-09-17

## 2019-09-17 RX ORDER — TRAMADOL HYDROCHLORIDE 50 MG/1
50 TABLET ORAL EVERY 6 HOURS
Qty: 30 TABLET | Refills: 0 | Status: SHIPPED | OUTPATIENT
Start: 2019-09-17 | End: 2019-10-09 | Stop reason: ALTCHOICE

## 2019-09-17 RX ORDER — OXYCODONE AND ACETAMINOPHEN 10; 325 MG/1; MG/1
1 TABLET ORAL EVERY 8 HOURS PRN
Qty: 30 TABLET | Refills: 0 | OUTPATIENT
Start: 2019-09-17

## 2019-09-17 RX ORDER — GABAPENTIN 800 MG/1
800 TABLET ORAL 3 TIMES DAILY
Qty: 90 TABLET | Refills: 2 | Status: SHIPPED | OUTPATIENT
Start: 2019-09-17

## 2019-09-17 NOTE — TELEPHONE ENCOUNTER
Contacted patient and left message re: results of his cervical xray.  I explained that it showed further degeneration and collapse of the C6 vertebral body which is causing a deformity.  This likely explains his pain.  I informed him that I have order a cervical collar through [facility] that will be delivered soon.  I instructed him to wear it at all times for now until definitive plan is set.  I asked him to contact clinic with questions, concerns, or changes in symptoms.

## 2019-09-17 NOTE — TELEPHONE ENCOUNTER
----- Message from Nahomi Cadence sent at 9/17/2019 12:36 PM CDT -----  Contact: self : [phone]  .Type: RX Refill Request    Who Called:  Self     Refill or New Rx: refill     RX Name and Strength: oxyCODONE-acetaminophen (PERCOCET)  mg per tablet, gabapentin (NEURONTIN) 800 MG tablet, diazePAM (VALIUM) 5 MG tablet, traMADol (ULTRAM) 50 mg tablet    Is this a 30 day or 90 day RX: 90     Preferred Pharmacy with phone number: .  [facility] [address] [facility], [place] - [address] [facility]  [address] [facility] [address]  [facility] [address]  Phone: [phone] Fax: [phone]    Local or Mail Order: local    Ordering Provider:     Would the patient rather a call back or a response via [facility]? Call back     Best Call Back Number: [phone]

## 2019-09-17 NOTE — TELEPHONE ENCOUNTER
Refilled tramadol but would not recommend continuing the valium and the percocet. Pt saw pain mgmt and rec's were not to use opioids for multiple reasons. I refilled tramadol for now but I can't continue this permanently.

## 2019-09-17 NOTE — TELEPHONE ENCOUNTER
----- Message from Mike Mensah sent at 9/17/2019 12:30 PM CDT -----  Contact: Pt. [phone]  The patient's wife would have the cell phone for the remainder of the day. Please communicate with her regarding the patient's issues.Her  Name is Cyndie

## 2019-09-19 ENCOUNTER — TELEPHONE (OUTPATIENT)
Dept: NEUROSURGERY | Facility: CLINIC | Age: 39
End: 2019-09-19

## 2019-09-19 ENCOUNTER — TELEPHONE (OUTPATIENT)
Dept: FAMILY MEDICINE | Facility: CLINIC | Age: 39
End: 2019-09-19

## 2019-09-19 NOTE — TELEPHONE ENCOUNTER
The patient is requesting a call back from the staff. He is upset and crying with panic attacks. He reports that the tramadol medication is not working at all for him and he has not slept in days, due to all the pain he is in. The patient states that he is not looking for a full Rx, he just needs something to get him through to his MRI appointment. He reports that his body will not physically allow him to do anything, without the onslaught of pain.  Informed patient; per provider; that he can not give him anything stronger than Tramadol. Patient said that he is at his threshold and done everything that he suppose to and need someone help him. He said that seem like he either will have to go and get street drugs are go to senior living for missing his court appointment because he is in so much pain.

## 2019-09-19 NOTE — TELEPHONE ENCOUNTER
I can't give him anything stronger. If he needs narcotics, ultimately he needs to go to addiction medicine - they might be able to give him methadone or similar. But that has to be through addiction medicine.    [facility] has a program but it's at the [facility]  [phone]    [facility] I believe also has a program:  PHONE: [phone]

## 2019-09-19 NOTE — TELEPHONE ENCOUNTER
----- Message from Rema Vazquez LPN sent at 9/19/2019  1:21 PM CDT -----  Carolyn Tolentino Staff  Caller: Palak(mom of the patient) [phone] (Today,  1:04 PM)         Type: Patient Call Back     Who called:Palak     What is the request in detail: Palak, mom of the patient, is requesting a call back from the staff. If possible, she would like the patient's excuse from court letter to be faxed over to her office. She reports that the patient is in pain and there is no way that he can physically come and pick it up. She would like the letter faxed to: [phone]     Can the clinic reply by [facility]?no     Would the patient rather a call back or a response via [facility]? Call back     Best call back number:[phone]

## 2019-09-19 NOTE — TELEPHONE ENCOUNTER
I spoke to the patient yesterday and informed him of the results of his new cervical x-ray.  I explained that his C6 vertebral body has collapsed causing a deformity just below his previous fusion.  I shared that I believe this is the source of his neck and arm pain.  I told him that I ordered a cervical collar that he should wear whenever he is not laying down.  He is due to have a cervical spine MRI and lab work  Soon to evaluate the status of his previous infection.  I informed him that he most likely will need surgery to correct the deformity in his cervical spine and to stabilize.      I wrote a letter explaining that he should be excused from traffic cord as I do not feel he is safe to attend at this time      ----- Message from Rema Vazquez LPN sent at 9/19/2019  9:20 AM CDT -----  Contact: Cyndie(wife) [phone]  Please advise on letter, I will call [facility] to check on brace.    Rema  ----- Message -----  From: Carolyn Dan  Sent: 9/19/2019   9:01 AM  To: Robinson Tolentino Staff    Type: Patient Call Back    Who called:Cyndie     What is the request in detail: Cyndie, the wife of the patient is requesting a note from Dr. Bowers. She states that the patient needs a note with a signature from Dr. Bowers, excusing him from traffic court because of his condition. The patient's wife states that there is no way the patient can stand up, due to his condition. She also mentioned talking to the staff, in regards to getting a neck brace for the patient.    Can the clinic reply by [facility]?no    Would the patient rather a call back or a response via [facility]? Call back     Best call back number:[phone]

## 2019-09-20 LAB
ACID FAST MOD KINY STN SPEC: NORMAL
MYCOBACTERIUM SPEC QL CULT: NORMAL

## 2019-09-21 ENCOUNTER — TELEPHONE (OUTPATIENT)
Dept: RADIOLOGY | Facility: HOSPITAL | Age: 39
End: 2019-09-21

## 2019-09-25 ENCOUNTER — TELEPHONE (OUTPATIENT)
Dept: RADIOLOGY | Facility: HOSPITAL | Age: 39
End: 2019-09-25

## 2019-09-26 ENCOUNTER — HOSPITAL ENCOUNTER (OUTPATIENT)
Dept: RADIOLOGY | Facility: HOSPITAL | Age: 39
Discharge: HOME | End: 2019-09-26
Attending: NEUROLOGICAL SURGERY
Payer: MEDICARE

## 2019-09-26 DIAGNOSIS — F19.90 SUBSTANCE USE DISORDER: ICD-10-CM

## 2019-09-26 DIAGNOSIS — J39.0 RETROPHARYNGEAL ABSCESS: ICD-10-CM

## 2019-09-26 DIAGNOSIS — Z98.1 HISTORY OF FUSION OF CERVICAL SPINE: ICD-10-CM

## 2019-09-26 DIAGNOSIS — M54.12 CERVICAL RADICULOPATHY: ICD-10-CM

## 2019-09-26 PROCEDURE — 72141 MRI NECK SPINE W/O DYE: CPT | Mod: TC

## 2019-09-26 PROCEDURE — 72141 MRI CERVICAL SPINE WITHOUT CONTRAST: ICD-10-PCS | Mod: 26,,, | Performed by: RADIOLOGY

## 2019-09-26 PROCEDURE — 72141 MRI NECK SPINE W/O DYE: CPT | Mod: 26,,, | Performed by: RADIOLOGY

## 2019-10-01 ENCOUNTER — TELEPHONE (OUTPATIENT)
Dept: NEUROSURGERY | Facility: CLINIC | Age: 39
End: 2019-10-01

## 2019-10-01 NOTE — TELEPHONE ENCOUNTER
Returned call, wife states patient is running low grade fever 101- 102° she is wondering if patient labs indicate he needs antibiotics.

## 2019-10-01 NOTE — TELEPHONE ENCOUNTER
----- Message from Natasha Katz sent at 10/1/2019  9:53 AM CDT -----  Contact: Cyndie Thomas (Spouse)  Pt's spouse called to discuss pt's current condition (FEVER) and lab results. [phone]

## 2019-10-04 ENCOUNTER — TELEPHONE (OUTPATIENT)
Dept: NEUROSURGERY | Facility: CLINIC | Age: 39
End: 2019-10-04

## 2019-10-04 DIAGNOSIS — Z98.1 S/P CERVICAL SPINAL FUSION: ICD-10-CM

## 2019-10-04 DIAGNOSIS — M40.202 KYPHOSIS OF CERVICAL REGION, UNSPECIFIED KYPHOSIS TYPE: Primary | ICD-10-CM

## 2019-10-04 DIAGNOSIS — M54.2 NECK PAIN: ICD-10-CM

## 2019-10-04 DIAGNOSIS — T84.216A HARDWARE FAILURE OF ANTERIOR COLUMN OF SPINE: ICD-10-CM

## 2019-10-04 RX ORDER — OXYCODONE AND ACETAMINOPHEN 5; 325 MG/1; MG/1
1 TABLET ORAL EVERY 8 HOURS PRN
Qty: 21 TABLET | Refills: 0 | Status: SHIPPED | OUTPATIENT
Start: 2019-10-04 | End: 2019-11-19 | Stop reason: CLARIF

## 2019-10-04 NOTE — TELEPHONE ENCOUNTER
Returned call, patients mom states patient would prefer medicine for panic attacks more than pain meds.

## 2019-10-04 NOTE — TELEPHONE ENCOUNTER
----- Message from Lily Nguyễn MA sent at 10/3/2019  2:01 PM CDT -----  Contact: Pt mother stan       ----- Message -----  From: Dipika Alonso  Sent: 10/3/2019   1:41 PM CDT  To: [facility] Staff    Type: Patient Call Back    Who called: Pt  Mother Stan    What is the request in detail: pt mom said pt need some rest due to him being in pain he can't sleep and he has anxiety.     Can the clinic reply by [facility]?no    Would the patient rather a call back or a response via [facility]? call    Best call back number:

## 2019-10-04 NOTE — TELEPHONE ENCOUNTER
Spoke with patient's mother. Patient continues to have significant pain and trouble sleeping. Dr. Bowers advised the patient to contact pcp for pain medication. He was given tramadol but this has not provided any relief. Further requests resulted in a recommendation for an addiction clinic.      The mother and I spoke openly about the patient's addiction history and tendency to take more than prescribed due to inadequate pain relief. She understands that this complicates our ability to prescribe narcotics. This applies to primary care and pain mgmt as well. However, the patient does have a very real reason for his increased pain: his C6 vertebral body has collapsed causing significant deformity within his cervical spine and deformity of his prior C5-6 fusion.      The patient's mother is aware that pain management before and after surgery will be complicated by his addiction history and increased tolerance to the medications. She also understands that a component of his post-op recovery must include addiction management.      I will discuss his case with Dr. Bowers when he is out of surgery and determine if there is anything we can safely prescribe to help take the edge off of his pain while awaiting surgery. He does have an appointment with Dr. Bowers next week. The mother understands that we may not have a resolution to this problem before next week.      UPDATE: case discussed with Dr. Bowers. Will prescribe 7 day supply of percocet 5/325, q8H prn. Rx to be printed and may be picked up from clinic. Discussed with patient's mother that he should not double his dose or take more frequently than every 8 hours. The medication is unlikely to fully relieve his pain but will hopefully take the edge off.         Tanna Ervin PA-C  [facility]  Department of Neurosurgery  [phone]

## 2019-10-04 NOTE — TELEPHONE ENCOUNTER
----- Message from Hiral Birch sent at 10/4/2019  1:16 PM CDT -----  Contact: pt  Type: Patient Call Back    Who called:pt    What is the request in detail:pt's mom umesh wants to discuss pt's health. Call back    Can the clinic reply by [facility]?    Would the patient rather a call back or a response via [facility]? Call back    Best call back number:[phone]    Additional Information:

## 2019-10-08 ENCOUNTER — TELEPHONE (OUTPATIENT)
Dept: PAIN MEDICINE | Facility: CLINIC | Age: 39
End: 2019-10-08

## 2019-10-08 ENCOUNTER — OFFICE VISIT (OUTPATIENT)
Dept: FAMILY MEDICINE | Facility: CLINIC | Age: 39
End: 2019-10-08
Payer: MEDICARE

## 2019-10-08 VITALS
WEIGHT: 129.19 LBS | HEART RATE: 94 BPM | DIASTOLIC BLOOD PRESSURE: 82 MMHG | OXYGEN SATURATION: 97 % | SYSTOLIC BLOOD PRESSURE: 121 MMHG | TEMPERATURE: 98 F | HEIGHT: 69 IN | BODY MASS INDEX: 19.13 KG/M2

## 2019-10-08 DIAGNOSIS — Z98.1 HISTORY OF FUSION OF CERVICAL SPINE: ICD-10-CM

## 2019-10-08 DIAGNOSIS — J39.0 RETROPHARYNGEAL ABSCESS: Primary | ICD-10-CM

## 2019-10-08 DIAGNOSIS — M54.12 CERVICAL RADICULOPATHY: ICD-10-CM

## 2019-10-08 DIAGNOSIS — M54.2 NECK PAIN, BILATERAL: ICD-10-CM

## 2019-10-08 DIAGNOSIS — F11.90 OPIOID USE DISORDER: ICD-10-CM

## 2019-10-08 DIAGNOSIS — G47.00 INSOMNIA, UNSPECIFIED TYPE: ICD-10-CM

## 2019-10-08 DIAGNOSIS — F41.9 ANXIETY: ICD-10-CM

## 2019-10-08 PROCEDURE — 3074F PR MOST RECENT SYSTOLIC BLOOD PRESSURE < 130 MM HG: ICD-10-PCS | Mod: S$GLB,,, | Performed by: FAMILY MEDICINE

## 2019-10-08 PROCEDURE — 99999 PR PBB SHADOW E&M-EST. PATIENT-LVL III: ICD-10-PCS | Mod: PBBFAC,,, | Performed by: FAMILY MEDICINE

## 2019-10-08 PROCEDURE — 3008F BODY MASS INDEX DOCD: CPT | Mod: S$GLB,,, | Performed by: FAMILY MEDICINE

## 2019-10-08 PROCEDURE — 3008F PR BODY MASS INDEX (BMI) DOCUMENTED: ICD-10-PCS | Mod: S$GLB,,, | Performed by: FAMILY MEDICINE

## 2019-10-08 PROCEDURE — 3079F DIAST BP 80-89 MM HG: CPT | Mod: S$GLB,,, | Performed by: FAMILY MEDICINE

## 2019-10-08 PROCEDURE — 99999 PR PBB SHADOW E&M-EST. PATIENT-LVL III: CPT | Mod: PBBFAC,,, | Performed by: FAMILY MEDICINE

## 2019-10-08 PROCEDURE — 99214 PR OFFICE/OUTPT VISIT, EST, LEVL IV, 30-39 MIN: ICD-10-PCS | Mod: S$GLB,,, | Performed by: FAMILY MEDICINE

## 2019-10-08 PROCEDURE — 3074F SYST BP LT 130 MM HG: CPT | Mod: S$GLB,,, | Performed by: FAMILY MEDICINE

## 2019-10-08 PROCEDURE — 3079F PR MOST RECENT DIASTOLIC BLOOD PRESSURE 80-89 MM HG: ICD-10-PCS | Mod: S$GLB,,, | Performed by: FAMILY MEDICINE

## 2019-10-08 PROCEDURE — 99214 OFFICE O/P EST MOD 30 MIN: CPT | Mod: S$GLB,,, | Performed by: FAMILY MEDICINE

## 2019-10-08 RX ORDER — HYDROCODONE BITARTRATE AND ACETAMINOPHEN 7.5; 325 MG/1; MG/1
TABLET ORAL
COMMUNITY
Start: 2016-11-11 | End: 2019-10-09 | Stop reason: ALTCHOICE

## 2019-10-08 RX ORDER — TIZANIDINE 4 MG/1
TABLET ORAL
COMMUNITY
Start: 2017-02-06 | End: 2019-10-09 | Stop reason: SDUPTHER

## 2019-10-08 NOTE — TELEPHONE ENCOUNTER
Patient's mother is upset regarding her son's pain and limited treatment options.  I listened to MsNarendra Palak and allowed her to express her frustration at length.  I will update Dr. Rivera.

## 2019-10-08 NOTE — PROGRESS NOTES
"Routine Office Visit    Patient Name: Marcial Thomas    : 1980  MRN: [id number]    Subjective:  Marcial is a 39 y.o. male who presents today for     1. Anxiety and chronic pain - Patient is established patient /new to me. Patient presents with severe anxiety 2/2 wearing a neck brace for cervical radiculopathy and retropharyngeal abscess. He states wearing a neck brace makes him feel claustrophobic and he feels like he cannot breathe. Patient c/o having severe chronic pain from his neck to his lower back from a previous injury. He has seen pain management and is scheduled to see neurosurgery tomorrow. He felt that his physicians did not understand his pain and how his activities of daily living caused him constant pain. He has not been able to sleep due to his constant pain. He gets interrupted sleep throughout the night. He was upset at the  because he was advised by the  that he has not had his ID for the previous 4 visits. He has not been able to get his ID from the [facility] because it would cause him too much pain.  He does not think it is important to be treated. He continues to complain about his anxiety. He feels his symptoms would improve with xanax. He has been receiving xanax from his aunt, "the purple pills". He feels that is the only thing that helps for his anxiety and his sleep. He has not been able to see a psychiatrist because the wait is 6 months. He is not willing to try other medications for anxiety. He became very angry and raised his voice. He yelled "Let's get the fuck out of here" to his wife. He proceeded to say that I was "No help" and that "[facility]. [facility]." Patient stormed out with normal gait and pace down the guthrie.    This was witnessed by Khloe Curtis and Dr. Khloe Coronel.     Review of Systems   Constitutional: Negative for chills and fever.   HENT: Negative for congestion.    Eyes: Negative for blurred vision. "   Respiratory: Negative for cough.    Cardiovascular: Negative for chest pain.   Gastrointestinal: Negative for abdominal pain, constipation, diarrhea, heartburn, nausea and vomiting.   Genitourinary: Negative for dysuria.   Musculoskeletal: Positive for back pain, joint pain, myalgias and neck pain.   Skin: Positive for rash. Negative for itching.   Neurological: Negative for dizziness and headaches.   Psychiatric/Behavioral: Positive for depression. The patient is nervous/anxious and has insomnia.        Active Problem List  Patient Active Problem List   Diagnosis    History of fusion of cervical spine 2006    Nicotine dependence, cigarettes, uncomplicated    Substance use disorder IVDU    Retropharyngeal abscess 7/19/19 I+D    Neck pain    MSSA (methicillin susceptible Staphylococcus aureus) infection    MRSA infection    Cervical radiculopathy       Past Surgical History  Past Surgical History:   Procedure Laterality Date    cervicle fusion      EPIDURAL BLOCK INJECTION      lumbar x 2    INCISION AND DRAINAGE OF ABSCESS Right 7/19/2019    Procedure: INCISION AND DRAINAGE, ABSCESS, prevertebral;  Surgeon: Kole Cornelius MD;  Location: [facility] OR [address] [place] [address];  Service: ENT;  Laterality: Right;  Dr. Hinojosa to check hardware in cervial.cg       Family History  Family History   Problem Relation Age of Onset    No Known Problems Mother     No Known Problems Father     No Known Problems Sister     No Known Problems Sister        Social History  Social History     Socioeconomic History    Marital status:      Spouse name: Not on file    Number of children: Not on file    Years of education: Not on file    Highest education level: Not on file   Occupational History    Occupation: side jobs/piece work.     Social Needs    Financial resource strain: Not on file    Food insecurity:     Worry: Not on file     Inability: Not on file    Transportation needs:     Medical: Not on file      Non-medical: Not on file   Tobacco Use    Smoking status: Current Every Day Smoker     Packs/day: 1.00     Types: Cigarettes    Smokeless tobacco: Former User   Substance and Sexual Activity    Alcohol use: No    Drug use: Yes     Types: IV     Comment: pain pills and IV Heroin this am    Sexual activity: Yes     Partners: Female   Lifestyle    Physical activity:     Days per week: Not on file     Minutes per session: Not on file    Stress: Not on file   Relationships    Social connections:     Talks on phone: Not on file     Gets together: Not on file     Attends Oriental orthodox service: Not on file     Active member of club or organization: Not on file     Attends meetings of clubs or organizations: Not on file     Relationship status: Not on file   Other Topics Concern    Not on file   Social History Narrative    He does side jobs.    History of IVDU           Medications and Allergies  Reviewed and updated.   Current Outpatient Medications   Medication Sig    acetaminophen (TYLENOL) 500 MG tablet Take 2 tablets (1,000 mg total) by mouth daily as needed for Pain.    diazePAM (VALIUM) 5 MG tablet Take 1 tablet (5 mg total) by mouth 2 (two) times daily.    gabapentin (NEURONTIN) 800 MG tablet Take 1 tablet (800 mg total) by mouth 3 (three) times daily.    HYDROcodone-acetaminophen (NORCO) 7.5-325 mg per tablet TAKE ONE TABLET BY MOUTH EVERY 6 TO 8 HOURS AS NEEDED FOR PAIN    oxyCODONE-acetaminophen (PERCOCET) 5-325 mg per tablet Take 1 tablet by mouth every 8 (eight) hours as needed for Pain.    oxyMORphone (OPANA ER) 7.5 mg TR12 TAKE ONE TABLET BY MOUTH EVERY 12 HOURS AS NEEDED FOR PAIN    tiZANidine (ZANAFLEX) 4 MG tablet TAKE ONE TABLET BY MOUTH EVERY 12 HOURS AS NEEDED (MAX 3 TABLETS PER 24 HOURS)    traMADol (ULTRAM) 50 mg tablet Take 1 tablet (50 mg total) by mouth every 6 (six) hours. Medically necessary to be > 7 day supply     No current facility-administered medications for this visit.   "      Physical Exam  /82 (BP Location: Left arm, Patient Position: Sitting, BP Method: Medium (Manual))   Pulse 94   Temp 98.3 °F (36.8 °C) (Oral)   Ht 5' 9" (1.753 m)   Wt 58.6 kg (129 lb 3 oz)   SpO2 97%   BMI 19.08 kg/m²   Physical Exam   Constitutional: He is oriented to person, place, and time. He appears well-developed.   HENT:   Head: Normocephalic and atraumatic.   Eyes: Conjunctivae and EOM are normal.   Neck: Neck supple.   Pulmonary/Chest: Effort normal. He has no wheezes.   Abdominal: He exhibits no distension. There is no tenderness. There is no guarding.   Musculoskeletal: Normal range of motion.   Neurological: He is alert and oriented to person, place, and time.   Skin: Skin is warm and dry. Rash noted.   Psychiatric: He has a normal mood and affect. His behavior is normal.         Assessment/Plan:  Marcial Thomas is a 39 y.o. male who presents today for :    Problem List Items Addressed This Visit        Neuro    Cervical radiculopathy    History of fusion of cervical spine 2006    Overview     7/2016 MRI C spine:  IMPRESSION:  1.  C5-6 anterior cervical fusion with degenerative change above and below the fusion level. 2. Mild central canal stenosis at C6-7.             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  Evaluated by Neurosurgery and ENT as he still has hardware in his neck.   Patient went for I&D 7/19 with ENT. Surgical cultures grew Staph. Neurosurgery not recommending any acute intervention.   Blood cultures grew MSSA.   Was on vancomycin and continuous cephazolin. 2D echo negative for endocarditis. Patient was unhappy with pain regimen despite being aggressive with his medications.  He has hx of leaving AMA.  Patient eloped prior to signing paperwork.  Discussed that he has current infection and is " at risk for developing sepsis and/or death.   7/25/2019 returned to ER/admitted. He has a pre-vertebral abscess drained by ENT and on IV abx when he eloped on 7/20 with a drain still in place in his neck. He has not been on any abx since he left and returns c/o worsening neck pain. Fluid collection seen on MRI, ENT has evaluated pt and they do not plan any interventions at this time. It is difficult to obtain IV access and he is a flight risk, he was placed on oral abx and pain regiment. Pt compliant oral abx regiment           Other Visit Diagnoses     Neck pain, bilateral        Opioid use disorder      Insomnia / anxiety     Patient walked out against medical advise. He left after I advised him that I could start him on something (seroquel) to help with anxiety and sleep that was not a benzodiazepine. I recommended this regimen until he is able to see psychiatry.  He became very angry. He wanted xanax prescription to be filled. He stood up and swung the door opened. He yelled at his wife to leave and told her to hurry up as she apologized. Patient walked out with normal pace and gait.               Follow up if symptoms worsen or fail to improve.

## 2019-10-08 NOTE — TELEPHONE ENCOUNTER
----- Message from Radha Fletcher sent at 10/8/2019 11:44 AM CDT -----  Contact: Nathalie Cid  Type: Patient Call Back    Who called: Nathalie Dukeslis    What is the request in detail: MotherParesh Palak of the pt is requesting a call back to discuss a personal matter.     Can the clinic reply by [facility]? No     Would the patient rather a call back or a response via [facility]? Call back     Best call back number: [phone]

## 2019-10-09 ENCOUNTER — OFFICE VISIT (OUTPATIENT)
Dept: NEUROSURGERY | Facility: CLINIC | Age: 39
End: 2019-10-09
Payer: MEDICARE

## 2019-10-09 ENCOUNTER — HOSPITAL ENCOUNTER (OUTPATIENT)
Dept: RADIOLOGY | Facility: HOSPITAL | Age: 39
Discharge: HOME | End: 2019-10-09
Attending: NEUROLOGICAL SURGERY
Payer: MEDICARE

## 2019-10-09 VITALS
BODY MASS INDEX: 19.43 KG/M2 | RESPIRATION RATE: 18 BRPM | SYSTOLIC BLOOD PRESSURE: 156 MMHG | WEIGHT: 131.19 LBS | HEART RATE: 88 BPM | HEIGHT: 69 IN | DIASTOLIC BLOOD PRESSURE: 101 MMHG

## 2019-10-09 DIAGNOSIS — M40.12 OTHER SECONDARY KYPHOSIS, CERVICAL REGION: Primary | ICD-10-CM

## 2019-10-09 DIAGNOSIS — M54.2 NECK PAIN, ACUTE: ICD-10-CM

## 2019-10-09 DIAGNOSIS — Z87.39 H/O OSTEOMYELITIS: ICD-10-CM

## 2019-10-09 DIAGNOSIS — Z98.1 HISTORY OF FUSION OF CERVICAL SPINE: ICD-10-CM

## 2019-10-09 DIAGNOSIS — M40.12 OTHER SECONDARY KYPHOSIS, CERVICAL REGION: ICD-10-CM

## 2019-10-09 DIAGNOSIS — M96.0 PSEUDARTHROSIS FOLLOWING SPINAL FUSION: ICD-10-CM

## 2019-10-09 DIAGNOSIS — M54.12 CERVICAL RADICULOPATHY: ICD-10-CM

## 2019-10-09 DIAGNOSIS — J39.0 RETROPHARYNGEAL ABSCESS: ICD-10-CM

## 2019-10-09 DIAGNOSIS — F17.210 NICOTINE DEPENDENCE, CIGARETTES, UNCOMPLICATED: ICD-10-CM

## 2019-10-09 DIAGNOSIS — F19.90 SUBSTANCE USE DISORDER: ICD-10-CM

## 2019-10-09 PROBLEM — M40.202 KYPHOSIS OF CERVICAL REGION: Status: ACTIVE | Noted: 2019-10-09

## 2019-10-09 PROCEDURE — 99215 PR OFFICE/OUTPT VISIT, EST, LEVL V, 40-54 MIN: ICD-10-PCS | Mod: S$GLB,,, | Performed by: NEUROLOGICAL SURGERY

## 2019-10-09 PROCEDURE — 99999 PR PBB SHADOW E&M-EST. PATIENT-LVL IV: ICD-10-PCS | Mod: PBBFAC,,, | Performed by: NEUROLOGICAL SURGERY

## 2019-10-09 PROCEDURE — 72050 XR CERVICAL SPINE AP LAT WITH FLEX EXTEN: ICD-10-PCS | Mod: 26,,, | Performed by: RADIOLOGY

## 2019-10-09 PROCEDURE — 72050 X-RAY EXAM NECK SPINE 4/5VWS: CPT | Mod: TC,FY

## 2019-10-09 PROCEDURE — 99999 PR PBB SHADOW E&M-EST. PATIENT-LVL IV: CPT | Mod: PBBFAC,,, | Performed by: NEUROLOGICAL SURGERY

## 2019-10-09 PROCEDURE — 3077F PR MOST RECENT SYSTOLIC BLOOD PRESSURE >= 140 MM HG: ICD-10-PCS | Mod: S$GLB,,, | Performed by: NEUROLOGICAL SURGERY

## 2019-10-09 PROCEDURE — 3080F PR MOST RECENT DIASTOLIC BLOOD PRESSURE >= 90 MM HG: ICD-10-PCS | Mod: S$GLB,,, | Performed by: NEUROLOGICAL SURGERY

## 2019-10-09 PROCEDURE — 3008F PR BODY MASS INDEX (BMI) DOCUMENTED: ICD-10-PCS | Mod: S$GLB,,, | Performed by: NEUROLOGICAL SURGERY

## 2019-10-09 PROCEDURE — 3080F DIAST BP >= 90 MM HG: CPT | Mod: S$GLB,,, | Performed by: NEUROLOGICAL SURGERY

## 2019-10-09 PROCEDURE — 3008F BODY MASS INDEX DOCD: CPT | Mod: S$GLB,,, | Performed by: NEUROLOGICAL SURGERY

## 2019-10-09 PROCEDURE — 72050 X-RAY EXAM NECK SPINE 4/5VWS: CPT | Mod: 26,,, | Performed by: RADIOLOGY

## 2019-10-09 PROCEDURE — 3077F SYST BP >= 140 MM HG: CPT | Mod: S$GLB,,, | Performed by: NEUROLOGICAL SURGERY

## 2019-10-09 PROCEDURE — 99215 OFFICE O/P EST HI 40 MIN: CPT | Mod: S$GLB,,, | Performed by: NEUROLOGICAL SURGERY

## 2019-10-09 RX ORDER — TIZANIDINE 4 MG/1
4 TABLET ORAL EVERY 8 HOURS
Qty: 90 TABLET | Refills: 0 | Status: SHIPPED | OUTPATIENT
Start: 2019-10-09
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
## 2019-10-11 ENCOUNTER — TELEPHONE (OUTPATIENT)
Dept: NEUROSURGERY | Facility: HOSPITAL | Age: 39
End: 2019-10-11

## 2019-10-11 NOTE — TELEPHONE ENCOUNTER
----- Message from Natasha Katz sent at 10/11/2019  9:26 AM CDT -----  Contact: Cyndie Thomas (Spouse)  Rx Refill/Request     Is this a Refill or New Rx:  New  Rx Name and Strength:  oxyCODONE-acetaminophen (PERCOCET) 5-325 mg per tablet  Preferred Pharmacy with phone number: [id number] - [place], [place] - [address] [facility] [address]  [address] [facility] [address] [facility] [address]  Phone: [phone] Fax: [phone]  Not a 24 hour pharmacy; exact hours not known.  Communication Preference:[phone]  Additional Information: [place] number is giving Px a FLAG

## 2019-10-11 NOTE — TELEPHONE ENCOUNTER
----- Message from Rema Vazquez LPN sent at 10/11/2019 11:51 AM CDT -----      ----- Message -----  From: [place] Hicks  Sent: 10/11/2019  11:45 AM CDT  To: [facility]ent[facility] Staff    Pt calling stated he needs to have a refill and that the code was flagged pt stated he is in a lot pain     Pt would like the rx filled oxyCODONE-acetaminophen (PERCOCET) 5-325 mg per tablet    [address] [address] [facility] [address]    Pt contact [phone]

## 2019-10-11 NOTE — TELEPHONE ENCOUNTER
Spoke with pharmacist who stated there was a glitch in the system when the patient tried to fill the prescription. For some reason, the JORDAN was flagged at that time. The prescription was given back to the patient/his wife to be filled elsewhere.     Left voicemail on wife's phone explaining the above. She may fill her prescription at another pharmacy or try [facility] again when the computers are working better.       Tanna Ervin PA-C  [facility]  Department of Neurosurgery  [phone]

## 2019-10-11 NOTE — TELEPHONE ENCOUNTER
Spoke with patient. Rx filled this afternoon using original prescription. Pharmacy computer issues resolved.       Tanna Ervin PA-C  [facility]  Department of Neurosurgery  [phone]

## 2019-10-16 ENCOUNTER — HOSPITAL ENCOUNTER (OUTPATIENT)
Dept: RADIOLOGY | Facility: HOSPITAL | Age: 39
Discharge: HOME | End: 2019-10-16
Attending: NEUROLOGICAL SURGERY
Payer: MEDICARE

## 2019-10-16 ENCOUNTER — TELEPHONE (OUTPATIENT)
Dept: FAMILY MEDICINE | Facility: CLINIC | Age: 39
End: 2019-10-16

## 2019-10-16 DIAGNOSIS — Z98.1 HISTORY OF FUSION OF CERVICAL SPINE: ICD-10-CM

## 2019-10-16 DIAGNOSIS — M96.0 PSEUDARTHROSIS FOLLOWING SPINAL FUSION: ICD-10-CM

## 2019-10-16 DIAGNOSIS — J39.0 RETROPHARYNGEAL ABSCESS: ICD-10-CM

## 2019-10-16 DIAGNOSIS — M40.12 OTHER SECONDARY KYPHOSIS, CERVICAL REGION: ICD-10-CM

## 2019-10-16 DIAGNOSIS — M54.2 NECK PAIN, ACUTE: ICD-10-CM

## 2019-10-16 DIAGNOSIS — Z87.39 H/O OSTEOMYELITIS: ICD-10-CM

## 2019-10-16 DIAGNOSIS — M54.12 CERVICAL RADICULOPATHY: ICD-10-CM

## 2019-10-16 PROCEDURE — 72125 CT NECK SPINE W/O DYE: CPT | Mod: TC

## 2019-10-16 PROCEDURE — 72125 CT NECK SPINE W/O DYE: CPT | Mod: 26,,, | Performed by: RADIOLOGY

## 2019-10-16 PROCEDURE — 72125 CT CERVICAL SPINE WITHOUT CONTRAST: ICD-10-PCS | Mod: 26,,, | Performed by: RADIOLOGY

## 2019-10-16 NOTE — TELEPHONE ENCOUNTER
Patient mother states that he has surgery on his necy Friday 10/25 but he will be getting admitted 10/24 Thursday. Patient mother would like to schedule Pre-Op appointment. Says she will damon her son to see exactly date and time works best for him to either see. Dr. Méndez or Angelita Petit.

## 2019-10-16 NOTE — TELEPHONE ENCOUNTER
Patient mother called in to let the staff know that the patient would like an appointment 10/23 and would prefer that it be in the afternoon after 1:00pm.     I offered another NP. Mother says it's ok long as it is afternoon. I scheduled patient for Wednesday 10/23/2019 at 4:00pm with Judith Sierra.

## 2019-10-16 NOTE — TELEPHONE ENCOUNTER
----- Message from Hiral Birch sent at 10/16/2019 12:17 PM CDT -----  Contact: pt's mother umesh [phone]  Type: Patient Call Back    Who called:pt's mother umesh [phone]    What is the request in detail:pt needs medical clearance. Call pt    Can the clinic reply by [facility]?    Would the patient rather a call back or a response via [facility]? Call back    Best call back number:pt's mother umesh [phone]    Additional Information:

## 2019-10-18 ENCOUNTER — TELEPHONE (OUTPATIENT)
Dept: NEUROSURGERY | Facility: CLINIC | Age: 39
End: 2019-10-18

## 2019-10-18 ENCOUNTER — TELEPHONE (OUTPATIENT)
Dept: NEUROSURGERY | Facility: HOSPITAL | Age: 39
End: 2019-10-18

## 2019-10-18 NOTE — TELEPHONE ENCOUNTER
Called pt to notify him of his appt with Dr. Husain for surgical consult per NABILA Corrales referral. SW pt's wife, she confirmed appt on 10/24 at 10AM at 7th floor neurosurgery clinic. Provided her with our contact number should she or the patient have any other questions prior to his appt. Denies further needs at this time.

## 2019-10-18 NOTE — TELEPHONE ENCOUNTER
Spoke with patient's wife.  Dr. Bowers has been trying to reach the patient to coordinate an appointment with Dr. Husain but has only been able to leave a voicemail.  The patient's wife (Cyndie) admitted that she has been avoiding calls from numbers that she does not know.  I will message Dr. Husain staff today an asked them to contact the patient for scheduling on a semi-urgent basis.  Cyndie will begin answering phone numbers that she does not recognize during this scheduling process

## 2019-10-18 NOTE — TELEPHONE ENCOUNTER
----- Message from Lily Nguyễn MA sent at 10/18/2019  3:24 PM CDT -----  Contact: Cyndie-Wife      ----- Message -----  From: Delfina Mccallum  Sent: 10/18/2019   3:01 PM CDT  To: [facility]ent[facility] Staff    Type: Patient Call Back    Who called:Cyndie    What is the request in detail:Cyndie called to discuss a date for patient's surgery because patient is in extreme discomfort. Please call to advise    Can the clinic reply by [facility]?    Would the patient rather a call back or a response via [facility]? Call    Best call back number:[phone]

## 2019-10-24 ENCOUNTER — TELEPHONE (OUTPATIENT)
Dept: NEUROSURGERY | Facility: CLINIC | Age: 39
End: 2019-10-24

## 2019-10-24 NOTE — TELEPHONE ENCOUNTER
Pt did not show up for his appt with Dr. Husain today. Called pt to asked what happened, and he stated he was in pain and overslept because he couldn't sleep last night. I asked pt if he could come to the clinic tomorrow, and he stated that he would come in, and that afternoon times are better for him. Appt scheduled for tomorrow, 10/25 at 1PM at [facility] clinic. Pt confirmed appt date, time, and location. Provided pt with our office number should he have any questions or concerns.

## 2019-10-25 ENCOUNTER — OFFICE VISIT (OUTPATIENT)
Dept: NEUROSURGERY | Facility: CLINIC | Age: 39
End: 2019-10-25
Payer: MEDICARE

## 2019-10-25 VITALS — DIASTOLIC BLOOD PRESSURE: 70 MMHG | SYSTOLIC BLOOD PRESSURE: 109 MMHG | HEART RATE: 87 BPM

## 2019-10-25 DIAGNOSIS — M53.2X2 CERVICAL SPINE INSTABILITY: Primary | ICD-10-CM

## 2019-10-25 DIAGNOSIS — M40.12 OTHER SECONDARY KYPHOSIS, CERVICAL REGION: ICD-10-CM

## 2019-10-25 PROCEDURE — 3074F SYST BP LT 130 MM HG: CPT | Mod: S$GLB,,, | Performed by: NEUROLOGICAL SURGERY

## 2019-10-25 PROCEDURE — 99999 PR PBB SHADOW E&M-EST. PATIENT-LVL II: CPT | Mod: PBBFAC,,, | Performed by: NEUROLOGICAL SURGERY

## 2019-10-25 PROCEDURE — 3078F PR MOST RECENT DIASTOLIC BLOOD PRESSURE < 80 MM HG: ICD-10-PCS | Mod: S$GLB,,, | Performed by: NEUROLOGICAL SURGERY

## 2019-10-25 PROCEDURE — 3074F PR MOST RECENT SYSTOLIC BLOOD PRESSURE < 130 MM HG: ICD-10-PCS | Mod: S$GLB,,, | Performed by: NEUROLOGICAL SURGERY

## 2019-10-25 PROCEDURE — 3078F DIAST BP <80 MM HG: CPT | Mod: S$GLB,,, | Performed by: NEUROLOGICAL SURGERY

## 2019-10-25 PROCEDURE — 99214 OFFICE O/P EST MOD 30 MIN: CPT | Mod: S$GLB,,, | Performed by: NEUROLOGICAL SURGERY

## 2019-10-25 PROCEDURE — 99999 PR PBB SHADOW E&M-EST. PATIENT-LVL II: ICD-10-PCS | Mod: PBBFAC,,, | Performed by: NEUROLOGICAL SURGERY

## 2019-10-25 PROCEDURE — 99214 PR OFFICE/OUTPT VISIT, EST, LEVL IV, 30-39 MIN: ICD-10-PCS | Mod: S$GLB,,, | Performed by: NEUROLOGICAL SURGERY

## 2019-10-25 NOTE — LETTER
October 25, 2019      Rajan Bowers, DO  [address] [facility] [address]  [address]  [facility] [address]           [facility] - Neurosurgery 7th Fl  [address]  [place] [address]  Phone: [phone]          Patient: Marcial Thomas   MR Number: [id number]   YOB: 1980   Date of Visit: 10/25/2019       Dear Dr. Rajan Bowers:    Thank you for referring Marcial Thomas to me for evaluation. Attached you will find relevant portions of my assessment and plan of care.    If you have questions, please do not hesitate to call me. I look forward to following Marcial Thomas along with you.    Sincerely,    Ramiro Husain MD    Enclosure  CC:  No Recipients    If you would like to receive this communication electronically, please contact [email or web address] or [phone] to request more information on [employer] Link access.    For providers and/or their staff who would like to refer a patient to [facility], please contact us through our one-stop-shop provider referral line, [facility], at [phone].    If you feel you have received this communication in error or would no longer like to receive these types of communications, please e-mail [email or web address]

## 2019-10-25 NOTE — PROGRESS NOTES
Dear Tanna Ervin PA-C,      Thank you for referring this patient to my clinic. The full details of my evaluation will also be forthcoming to you by letter.    CHIEF COMPLAINT:  Consult for neck and RUE pain    I, Jeremy Mills, attest that this documentation has been prepared under the direction and in the presence of Ramiro Husain MD.    HPI:  Marcial Thomas is a 39 y.o.  male, who is referred to me by Tanna Ervin PA-C for evaluation of neck and RUE pain. Pt's previous ACDF was performed approximately 10 years ago for a herniated disc. His surgery by ENT to washout infection was performed from a right sided approach. His previous ACDF was performed from a right sided approach. Pt is using IV drugs. He reports significant pain in his shoulders, neck, and under his right scapula. He experiences shooting arm pain as well. He has endured numbness in his 1st three fingers of both hands. It has improved in his left and is intermittent in his right. Pt notes hand clumsiness. He denies any imbalance. He reports difficulty urinating. Pt states that he quit taking drugs for 1 year but when his neck pain began he began using IV drugs again. Pt reports that he is willing to quit using IV drugs when he receives something to control his pain.       Review of patient's allergies indicates:   Allergen Reactions    Vicodin [hydrocodone-acetaminophen] Hives       Past Medical History:   Diagnosis Date    Abscess, prevertebral soft tissue of neck 7/17/2019 7/16/2019 CT neck done at  Large fluid collection with a few foci of gas centered in the prevertebral soft tissues anterior to the C3-C7 levels, highly suspicious for prevertebral abscess.  The larynx is displaced anteriorly. Anterior fusion of C5-C6. Findings were discussed with Ramiro Carrasco MD on 7/16/2019 at 1530 hours.  Additional contrast enhanced CT images of the neck were recommended.    Chronic neck pain     Lumbar radiculopathy 7/2016 MRI  with degeneration, mild central stenosis L3-4 2/8/2019 7/7/16 MRI L spine IMPRESSION:  1.  Mild discogenic predominant degenerative changes of the lower lumbar spine with mild central canal stenosis at L3-4. 2. Annular fissures at L3-4 and L4-5.     MRSA infection 8/12/2019    MSSA (methicillin susceptible Staphylococcus aureus) infection 7/25/2019    Nodule of left lobe of thyroid gland incidental on CT 7/2019 8 mm rec's thyroid US 7/18/2019    Opioid use disorder 2/8/2019    Retropharyngeal abscess 7/19/19 I+D 7/18/2019 7-  history of cervical fusion C5-6, IV drug abuse, neuromuscular disorder, hypertension, anxiety who presents with retropharyngeal abscess.  Evaluated by Neurosurgery and ENT as he still has hardware in his neck.  Patient went for I&D 7/19 with ENT. Surgical cultures grew Staph. Neurosurgery not recommending any acute intervention.  Blood cultures grew MSSA.  Was on vancomycin and continuo     Past Surgical History:   Procedure Laterality Date    cervicle fusion      EPIDURAL BLOCK INJECTION      lumbar x 2    INCISION AND DRAINAGE OF ABSCESS Right 7/19/2019    Procedure: INCISION AND DRAINAGE, ABSCESS, prevertebral;  Surgeon: Kole Cornelius MD;  Location: [facility] OR [address] [place] [address];  Service: ENT;  Laterality: Right;  Dr. Hinojosa to check hardware in cervial.cg     Family History   Problem Relation Age of Onset    No Known Problems Mother     No Known Problems Father     No Known Problems Sister     No Known Problems Sister      Social History     Tobacco Use    Smoking status: Current Every Day Smoker     Packs/day: 1.00     Types: Cigarettes    Smokeless tobacco: Former User   Substance Use Topics    Alcohol use: No    Drug use: Yes     Types: IV     Comment: pain pills and IV Heroin this am        Review of Systems   Constitutional: Negative.    HENT: Negative.    Eyes: Negative.    Respiratory: Negative.    Cardiovascular: Negative.    Gastrointestinal: Negative.     Endocrine: Negative.    Genitourinary: Positive for difficulty urinating.   Musculoskeletal: Positive for back pain (mid back right paramedian), myalgias (trapezius; BUE) and neck pain. Negative for gait problem.   Skin: Negative.    Allergic/Immunologic: Negative.    Neurological: Positive for numbness (1st three fingers of both hands). Negative for weakness, light-headedness and headaches.        Hand clumsiness   Hematological: Negative.    Psychiatric/Behavioral: Negative.        OBJECTIVE:   Vital Signs:  Pulse: 87 (10/25/19 1315)  BP: 109/70 (10/25/19 1315)    Physical Exam:    Vital signs: All nursing notes and vital signs reviewed -- afebrile, vital signs stable.  Constitutional: Patient sitting comfortably in chair. Appears well developed and well nourished.  Skin: Exposed areas are intact without abnormal markings, rashes or other lesions. Two parallel well healed right neck incisions.   HEENT: Normocephalic. Normal conjunctivae.  Cardiovascular: Normal rate and regular rhythm.  Respiratory: Chest wall rises and falls symmetrically, without signs of respiratory distress.  Abdomen: Soft and non-tender.  Extremities: Warm and without edema. Calves supple, non-tender.  Psych/Behavior: Normal affect.    Neurological:    Mental status: Alert and oriented. Conversational and appropriate.       Cranial Nerves: Grossly intact.     Motor:    Upper:  Deltoids Triceps Biceps WE WF     R 5/5 5/5 5/5 5/5 5/5 5/5    L 5/5 5/5 5/5 5/5 5/5 5/5      Lower:  HF KE KF DF PF EHL    R 5/5 5/5 5/5 5/5 5/5 5/5    L 5/5 5/5 5/5 5/5 5/5 5/5     Sensory: Intact sensation to light touch in all extremities. Romberg negative.    Reflexes:          DTR: 2+ symmetrically throughout.     Saldaña's: Negative.     Babinski's: Negative.     Clonus: Negative.    Cerebellar: Finger-to-nose and rapid alternating movements normal. Gait stable, fluid.    Spine:    Posture: Head well aligned over pelvis in front and side views.  No focal or  global spinal deformity visible on inspection. Shoulders and hips even. No obvious leg length discrepancy. No scapula winging.    Bending: Full ROM with forward, back and lateral bending. No rib prominence with forward bend.    Cervical:      ROM: Full with flexion, extension, lateral rotation and ear-to-shoulder bend.      Midline TTP: Negative.     Spurling's test: Negative.     Lhermitte's: Negative.    Thoracic:     Midline TTP: Negative    Lumbar:     Midline TTP: Negative     Straight Leg Test: Negative     Crossed Straight Leg Test: Negative     Sciatic notch tenderness: Negative.    Other:     SI joint TTP: Negative.     Greater trochanter TTP: Negative.     Tenderness with external/internal hip rotation: Negative.    Diagnostic Results:  All imaging was independently reviewed by me.    Labs, dated 9/26/2019:  1. CRP: Normal  2. ESR: Mildly elevated   3. Procalcitonin: Normal    Surgical Cultures, dated 7/19/2019:  1. Staph aureus    MRI C-spine, dated 9/26/2019:  1. Cervical cord kinking at C6/7   2. Decreased anterior epidural fluid collection at C6/7    CT C-spine, dated 10/16/2019:  1. Prior C5/6 ACDF with a focal kyphotic deformity at C6/7 secondary to C7 bone erosion and collapse with the C6 body subsiding into the C7 body  2. Posterior aspect of C6 is jutting into the ventral canal  3. Bilateral splaying of the C6/7 facets    ASSESSMENT/PLAN:     Marcial Thomas has focal kyphotic deformity at C6-7 secondary to pathology fracture related to osteomyelitis s/p ABX. He also has significant spinal cord kinking and spinal instability. The natural history untreated is progression of the deformity, myelopathy and likely paraplegia/paralysis. He will need a front back 2 stage deformity surgery with a C6 and C7 corpectomy, followed by a PCF on a separate day. He will need to be admitted 48 hours before surgery for traction. We reviewed the R/B/AI/M of surgery in detail. I highlighted that the biggest  surgical risk is his noncompliance with after care and continued IV drug use. Overall his risk of hardware failure, repeat infection, paralysis and death is high due to the continued IV drug use.    The patient understands and agrees with the plan of care. All questions were answered.     1. Two stage front-back cervical deformity surgery      I, Dr. Ramiro Husain personally performed the services described in this documentation. All medical record entries made by the scribe, Jeremy Mills, were at my direction and in my presence.  I have reviewed the chart and agree that the record reflects my personal performance and is accurate and complete.      Ramiro Husain M.D.  Department of Neurosurgery  [facility]      .

## 2019-10-29 ENCOUNTER — TELEPHONE (OUTPATIENT)
Dept: NEUROSURGERY | Facility: CLINIC | Age: 39
End: 2019-10-29

## 2019-10-29 NOTE — TELEPHONE ENCOUNTER
TALA w/pt's mother and she would like to add the pt back onto the Sx schedule.  Pt rescheduled for Sx on 11.27.2019 and will require traction for two days prior to Sx.    Pt is scheduled to see Dr. Oswald ([phone]) for PCP establishment and Sx clearance on 11.12.2019.  Mother was emotional and crying and claiming her situation with the pt is challenging bc of his addiction issues.  She claims the pt is terrified of Sx but willing to move forward with the Sx plan.  Attempted to reach the PCP to discuss a PM plan but the office is closed.  Will try again 10.30.2019.     ----- Message from Elinor Braun sent at 10/29/2019  2:37 PM CDT -----  Contact: patient mother   [phone]-mother chapito -please call above patient mother at number in message need to speak with the nurse about clearance for surgery thanks.

## 2019-10-30 ENCOUNTER — TELEPHONE (OUTPATIENT)
Dept: NEUROSURGERY | Facility: CLINIC | Age: 39
End: 2019-10-30

## 2019-10-30 NOTE — TELEPHONE ENCOUNTER
Confirmed pt's Sx date for 11.27.2019.  TALA Agudelo ([phone]) from PCP office and briefed on the pt's addiction struggle and she notify her supervisor.  Provided ins info and they will coordinate PM for the pt pre- and post-op.  Faxed PCP clearance ([phone] F). V/U.----- Message from Tomas Ellis sent at 10/30/2019  8:14 AM CDT -----  Contact: [place] ([facility] @ [phone]  Palak is calling to confirm date/time of surgery

## 2019-10-31 ENCOUNTER — TELEPHONE (OUTPATIENT)
Dept: NEUROSURGERY | Facility: CLINIC | Age: 39
End: 2019-10-31

## 2019-11-04 ENCOUNTER — TELEPHONE (OUTPATIENT)
Dept: SPINE | Facility: CLINIC | Age: 39
End: 2019-11-04

## 2019-11-04 NOTE — TELEPHONE ENCOUNTER
Pt has moved his PCP clearance to 11.07.2019.  States he is in pain and anxious to be helped.  Explained we have discussed the necessity for PM services w/his PCP.  Reminded pt to answer and return calls from [facility] staff for full clearance for Sx. Pt appreciative, V/U.  ----- Message from Nithya Fraire sent at 11/4/2019 10:24 AM CST -----  Contact: pt wife  Please call pt wife at [phone]    Patient wife has questions regarding pain mgmt    Thank you

## 2019-11-12 ENCOUNTER — OFFICE VISIT (OUTPATIENT)
Dept: INFECTIOUS DISEASES | Facility: CLINIC | Age: 39
End: 2019-11-12
Payer: MEDICARE

## 2019-11-12 VITALS
SYSTOLIC BLOOD PRESSURE: 122 MMHG | HEART RATE: 81 BPM | TEMPERATURE: 99 F | BODY MASS INDEX: 20.05 KG/M2 | WEIGHT: 135.38 LBS | DIASTOLIC BLOOD PRESSURE: 84 MMHG | HEIGHT: 69 IN

## 2019-11-12 DIAGNOSIS — Z51.81 THERAPEUTIC DRUG MONITORING: Primary | ICD-10-CM

## 2019-11-12 DIAGNOSIS — A49.01 MSSA (METHICILLIN SUSCEPTIBLE STAPHYLOCOCCUS AUREUS) INFECTION: ICD-10-CM

## 2019-11-12 PROCEDURE — 3074F SYST BP LT 130 MM HG: CPT | Mod: S$GLB,,, | Performed by: PHYSICIAN ASSISTANT

## 2019-11-12 PROCEDURE — 3074F PR MOST RECENT SYSTOLIC BLOOD PRESSURE < 130 MM HG: ICD-10-PCS | Mod: S$GLB,,, | Performed by: PHYSICIAN ASSISTANT

## 2019-11-12 PROCEDURE — 3008F BODY MASS INDEX DOCD: CPT | Mod: S$GLB,,, | Performed by: PHYSICIAN ASSISTANT

## 2019-11-12 PROCEDURE — 3079F DIAST BP 80-89 MM HG: CPT | Mod: S$GLB,,, | Performed by: PHYSICIAN ASSISTANT

## 2019-11-12 PROCEDURE — 99213 PR OFFICE/OUTPT VISIT, EST, LEVL III, 20-29 MIN: ICD-10-PCS | Mod: S$GLB,,, | Performed by: PHYSICIAN ASSISTANT

## 2019-11-12 PROCEDURE — 99213 OFFICE O/P EST LOW 20 MIN: CPT | Mod: S$GLB,,, | Performed by: PHYSICIAN ASSISTANT

## 2019-11-12 PROCEDURE — 3008F PR BODY MASS INDEX (BMI) DOCUMENTED: ICD-10-PCS | Mod: S$GLB,,, | Performed by: PHYSICIAN ASSISTANT

## 2019-11-12 PROCEDURE — 99999 PR PBB SHADOW E&M-EST. PATIENT-LVL III: ICD-10-PCS | Mod: PBBFAC,,, | Performed by: PHYSICIAN ASSISTANT

## 2019-11-12 PROCEDURE — 99999 PR PBB SHADOW E&M-EST. PATIENT-LVL III: CPT | Mod: PBBFAC,,, | Performed by: PHYSICIAN ASSISTANT

## 2019-11-12 PROCEDURE — 3079F PR MOST RECENT DIASTOLIC BLOOD PRESSURE 80-89 MM HG: ICD-10-PCS | Mod: S$GLB,,, | Performed by: PHYSICIAN ASSISTANT
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
## 2019-11-12 NOTE — LETTER
November 14, 2019      Rajan Bowers, DO  [address] [facility] [address]  [address]  [place] [address]           [facility] - Infectious Diseases  [address]  [place] [address]  Phone: [phone]  Fax: [phone]          Patient: Marcial Thomas   MR Number: [id number]   YOB: 1980   Date of Visit: 11/12/2019       Dear Dr. Rajan Bowers:    Thank you for referring Marcial Thomas to me for evaluation. Attached you will find relevant portions of my assessment and plan of care.    If you have questions, please do not hesitate to call me. I look forward to following Marcial Thomas along with you.    Sincerely,    Rod Sharma PA-C    Enclosure  CC:  No Recipients    If you would like to receive this communication electronically, please contact [email or web address] or [phone] to request more information on [employer] Link access.    For providers and/or their staff who would like to refer a patient to [facility], please contact us through our one-stop-shop provider referral line, [facility], at [phone].    If you feel you have received this communication in error or would no longer like to receive these types of communications, please e-mail [email or web address]

## 2019-11-14 ENCOUNTER — PES CALL (OUTPATIENT)
Dept: ADMINISTRATIVE | Facility: CLINIC | Age: 39
End: 2019-11-14

## 2019-11-15 ENCOUNTER — TELEPHONE (OUTPATIENT)
Dept: NEUROSURGERY | Facility: CLINIC | Age: 39
End: 2019-11-15

## 2019-11-18 ENCOUNTER — PES CALL (OUTPATIENT)
Dept: ADMINISTRATIVE | Facility: CLINIC | Age: 39
End: 2019-11-18

## 2019-11-18 ENCOUNTER — TELEPHONE (OUTPATIENT)
Dept: NEUROSURGERY | Facility: CLINIC | Age: 39
End: 2019-11-18

## 2019-11-18 ENCOUNTER — TELEPHONE (OUTPATIENT)
Dept: FAMILY MEDICINE | Facility: CLINIC | Age: 39
End: 2019-11-18

## 2019-11-18 DIAGNOSIS — M53.2X2 CERVICAL SPINE INSTABILITY: ICD-10-CM

## 2019-11-18 DIAGNOSIS — Z98.1 S/P CERVICAL SPINAL FUSION: ICD-10-CM

## 2019-11-18 DIAGNOSIS — M46.22 OSTEOMYELITIS OF CERVICAL SPINE: Primary | ICD-10-CM

## 2019-11-18 DIAGNOSIS — G95.9 CERVICAL MYELOPATHY: ICD-10-CM

## 2019-11-18 NOTE — TELEPHONE ENCOUNTER
Pt V/U to contact ID ([phone]) to discuss rescheduling his lab work.  Reviewed Sx plan and traction check in through the Ed 2 days prior.  V/U. ----- Message from Tomas Ellis sent at 11/18/2019  3:52 PM CST -----  Contact: pt @ [phone]  Pt is asking to speak w/ the nurse to confirm info about surgery coming up

## 2019-11-18 NOTE — TELEPHONE ENCOUNTER
----- Message from Hiral Sanderson sent at 11/18/2019 11:03 AM CST -----  Contact: self / [phone]  Patient is returning a call from your office. Please advise

## 2019-11-19 ENCOUNTER — TELEPHONE (OUTPATIENT)
Dept: NEUROSURGERY | Facility: CLINIC | Age: 39
End: 2019-11-19

## 2019-11-19 DIAGNOSIS — Z01.818 PREOPERATIVE TESTING: Primary | ICD-10-CM

## 2019-11-19 DIAGNOSIS — G95.9 MYELOPATHY: ICD-10-CM

## 2019-11-19 DIAGNOSIS — M46.22 OSTEOMYELITIS OF VERTEBRA, CERVICAL REGION: ICD-10-CM

## 2019-11-19 DIAGNOSIS — M40.209 KYPHOSIS, UNSPECIFIED KYPHOSIS TYPE, UNSPECIFIED SPINAL REGION: Primary | ICD-10-CM

## 2019-11-19 NOTE — TELEPHONE ENCOUNTER
Pt states he is concerned he is concerned about dehydration while in traction.  Explained the inpt hospital staff will make sure he is receiving the care he needs.   Pt states he will purchase and begin to use Dial soap for bathing and is taking only Tylenol and calcium tablets.  V/U. ----- Message from Daniel Ng sent at 11/19/2019 11:12 AM CST -----  Contact: Patient @ [phone]  Patient requesting a return call with a surgery concern, pls call to discuss

## 2019-11-20 NOTE — TELEPHONE ENCOUNTER
Returned pt's call. He states that he has a traffic court date in [facility] and is unable to go  line or sit still in order to tolerate the court appearance. Pt is currently suffering from complex spine condition and is scheduled to undergo the first of two spine surgeries next week on 11/26/19. Wrote letter and faxed it to [phone] Attn: Palak MITCHELL Per pt's request.    When I started to explain that pt will be admitted prior to his surgery, therefore he will not need to worry about his surgery time, pt stated that he actually did not have any questions or concerns regarding his surgery date or time. He v/u that he is to arrive at [facility] on Monday 11/25/19 to be admitted and placed in traction in preparation for surgery. Pt was pleasant throughout phone call and very appreciative of our help. Denies further needs at this time.        ----- Message from Daniel Ng sent at 11/20/2019 11:27 AM CST -----  Contact: Patient @ [phone]  Patient needs to discuss a letter he needs faxed to Traffic court @ [phone] and to get an pre op instruction regarding the time of the surgery, pls call

## 2019-11-22 ENCOUNTER — TELEPHONE (OUTPATIENT)
Dept: NEUROSURGERY | Facility: CLINIC | Age: 39
End: 2019-11-22

## 2019-11-22 NOTE — TELEPHONE ENCOUNTER
Called and spoke with pt reviewing plan for his admission on Monday, 11/25/19. Pt is to arrive at the admissions office on the 1st floor at [facility] on [address] at 7AM to be admitted for traction x2 days prior to his spine surgery scheduled with Dr. Husain next Wednesday. Pt confirms and agrees with plan, all questions answered, denies further needs at this time.

## 2019-11-22 NOTE — TELEPHONE ENCOUNTER
Reviewed recent messages about pt welfare w/Dr. Husain.  Dr. Husain verified the pt as comptetent to make his own choices related to his upcoming Sx.  Attempted to reach pt to discuss directly and LVM urging pt to go to the ED.  ----- Message from Tomas Ellis sent at 11/22/2019  4:31 PM CST -----  Contact: Palak (mom) @ [phone]  Palak states pt called to cancel surgery, but Palak does not want the surgery canceled

## 2019-11-22 NOTE — TELEPHONE ENCOUNTER
Attempted to reach pt to discuss his concerns related to his upcoming Sx.  Recommended multiple time to go the ED and provided the number to the clinic ([phone]). Explained we will be available by phone in the clinic until 5:00PM but urged the pt to call 911 for an ambulance to go to the closest ED.   All info left in VM.  ----- Message from Natividad Curiel sent at 11/22/2019  3:42 PM CST -----  Contact: [phone]  Lisha pt-Pt cant take it anymore and is talking about  Suicide.  Stated that he is on heroin and Morphine and wants to cancel his surgeries next week.  Said for us to have a good life.  [facility] office was notified to do a well check on him and is going to the residence.  They will call Natasha Segovia the Supervisor back with update.   I did not cancel anything.

## 2019-11-25 ENCOUNTER — TELEPHONE (OUTPATIENT)
Dept: SPINE | Facility: CLINIC | Age: 39
End: 2019-11-25

## 2019-11-25 ENCOUNTER — HOSPITAL ENCOUNTER (OUTPATIENT)
Facility: HOSPITAL | Age: 39
Discharge: LEFT BEFORE DISCHARGE | DRG: 552 | End: 2019-11-25
Attending: EMERGENCY MEDICINE | Admitting: NEUROLOGICAL SURGERY
Payer: MEDICARE

## 2019-11-25 VITALS
TEMPERATURE: 99 F | DIASTOLIC BLOOD PRESSURE: 64 MMHG | OXYGEN SATURATION: 95 % | SYSTOLIC BLOOD PRESSURE: 121 MMHG | RESPIRATION RATE: 18 BRPM | BODY MASS INDEX: 20.73 KG/M2 | HEART RATE: 77 BPM | WEIGHT: 140 LBS | HEIGHT: 69 IN

## 2019-11-25 DIAGNOSIS — Z01.818 PREOPERATIVE TESTING: ICD-10-CM

## 2019-11-25 DIAGNOSIS — F19.90 IVDU (INTRAVENOUS DRUG USER): ICD-10-CM

## 2019-11-25 DIAGNOSIS — M40.202 CERVICAL KYPHOSIS: ICD-10-CM

## 2019-11-25 DIAGNOSIS — S12.9XXA: ICD-10-CM

## 2019-11-25 DIAGNOSIS — F11.220 OPIOID DEPENDENCE WITH UNCOMPLICATED INTOXICATION: ICD-10-CM

## 2019-11-25 DIAGNOSIS — M40.12 OTHER SECONDARY KYPHOSIS, CERVICAL REGION: Primary | ICD-10-CM

## 2019-11-25 DIAGNOSIS — Z01.818 PREOPERATIVE CLEARANCE: ICD-10-CM

## 2019-11-25 DIAGNOSIS — F41.9 ANXIETY: ICD-10-CM

## 2019-11-25 DIAGNOSIS — G95.20 CERVICAL SPINAL CORD COMPRESSION: ICD-10-CM

## 2019-11-25 LAB
ABO + RH BLD: NORMAL
ANION GAP SERPL CALC-SCNC: 9 MMOL/L (ref 8–16)
APTT BLDCRRT: 25.8 SEC (ref 21–32)
BASOPHILS # BLD AUTO: 0.05 K/UL (ref 0–0.2)
BASOPHILS NFR BLD: 0.6 % (ref 0–1.9)
BLD GP AB SCN CELLS X3 SERPL QL: NORMAL
BUN SERPL-MCNC: 8 MG/DL (ref 6–20)
CALCIUM SERPL-MCNC: 9.4 MG/DL (ref 8.7–10.5)
CHLORIDE SERPL-SCNC: 103 MMOL/L (ref 95–110)
CO2 SERPL-SCNC: 28 MMOL/L (ref 23–29)
CREAT SERPL-MCNC: 0.9 MG/DL (ref 0.5–1.4)
CRP SERPL-MCNC: 12.6 MG/L (ref 0–8.2)
DIFFERENTIAL METHOD: ABNORMAL
EOSINOPHIL # BLD AUTO: 0.3 K/UL (ref 0–0.5)
EOSINOPHIL NFR BLD: 3.3 % (ref 0–8)
ERYTHROCYTE [DISTWIDTH] IN BLOOD BY AUTOMATED COUNT: 13.8 % (ref 11.5–14.5)
ERYTHROCYTE [SEDIMENTATION RATE] IN BLOOD BY WESTERGREN METHOD: 36 MM/HR (ref 0–23)
EST. GFR  (AFRICAN AMERICAN): >60 ML/MIN/1.73 M^2
EST. GFR  (NON AFRICAN AMERICAN): >60 ML/MIN/1.73 M^2
GLUCOSE SERPL-MCNC: 70 MG/DL (ref 70–110)
HCT VFR BLD AUTO: 43 % (ref 40–54)
HGB BLD-MCNC: 13.3 G/DL (ref 14–18)
IMM GRANULOCYTES # BLD AUTO: 0.02 K/UL (ref 0–0.04)
IMM GRANULOCYTES NFR BLD AUTO: 0.2 % (ref 0–0.5)
INR PPP: 1 (ref 0.8–1.2)
INR PPP: 1 (ref 0.8–1.2)
LYMPHOCYTES # BLD AUTO: 2.7 K/UL (ref 1–4.8)
LYMPHOCYTES NFR BLD: 30.4 % (ref 18–48)
MCH RBC QN AUTO: 28 PG (ref 27–31)
MCHC RBC AUTO-ENTMCNC: 30.9 G/DL (ref 32–36)
MCV RBC AUTO: 91 FL (ref 82–98)
MONOCYTES # BLD AUTO: 0.9 K/UL (ref 0.3–1)
MONOCYTES NFR BLD: 10.6 % (ref 4–15)
NEUTROPHILS # BLD AUTO: 4.9 K/UL (ref 1.8–7.7)
NEUTROPHILS NFR BLD: 54.9 % (ref 38–73)
NRBC BLD-RTO: 0 /100 WBC
PLATELET # BLD AUTO: 234 K/UL (ref 150–350)
PMV BLD AUTO: 10.5 FL (ref 9.2–12.9)
POTASSIUM SERPL-SCNC: 4.3 MMOL/L (ref 3.5–5.1)
PROCALCITONIN SERPL IA-MCNC: 0.05 NG/ML
PROTHROMBIN TIME: 10.5 SEC (ref 9–12.5)
PROTHROMBIN TIME: 10.5 SEC (ref 9–12.5)
RBC # BLD AUTO: 4.75 M/UL (ref 4.6–6.2)
SODIUM SERPL-SCNC: 140 MMOL/L (ref 136–145)
WBC # BLD AUTO: 8.9 K/UL (ref 3.9–12.7)

## 2019-11-25 PROCEDURE — 99284 EMERGENCY DEPT VISIT MOD MDM: CPT | Mod: ,,, | Performed by: EMERGENCY MEDICINE

## 2019-11-25 PROCEDURE — 85652 RBC SED RATE AUTOMATED: CPT

## 2019-11-25 PROCEDURE — 85025 COMPLETE CBC W/AUTO DIFF WBC: CPT

## 2019-11-25 PROCEDURE — 99284 EMERGENCY DEPT VISIT MOD MDM: CPT | Mod: ,,, | Performed by: PHYSICIAN ASSISTANT

## 2019-11-25 PROCEDURE — S4991 NICOTINE PATCH NONLEGEND: HCPCS | Performed by: PHYSICIAN ASSISTANT

## 2019-11-25 PROCEDURE — 85730 THROMBOPLASTIN TIME PARTIAL: CPT

## 2019-11-25 PROCEDURE — 99284 EMERGENCY DEPT VISIT MOD MDM: CPT | Mod: 25

## 2019-11-25 PROCEDURE — 86140 C-REACTIVE PROTEIN: CPT

## 2019-11-25 PROCEDURE — 25000003 PHARM REV CODE 250: Performed by: PHYSICIAN ASSISTANT

## 2019-11-25 PROCEDURE — G0378 HOSPITAL OBSERVATION PER HR: HCPCS

## 2019-11-25 PROCEDURE — 84145 PROCALCITONIN (PCT): CPT

## 2019-11-25 PROCEDURE — 99284 PR EMERGENCY DEPT VISIT,LEVEL IV: ICD-10-PCS | Mod: ,,, | Performed by: EMERGENCY MEDICINE

## 2019-11-25 PROCEDURE — 80048 BASIC METABOLIC PNL TOTAL CA: CPT

## 2019-11-25 PROCEDURE — 99284 PR EMERGENCY DEPT VISIT,LEVEL IV: ICD-10-PCS | Mod: ,,, | Performed by: PHYSICIAN ASSISTANT

## 2019-11-25 PROCEDURE — 85610 PROTHROMBIN TIME: CPT

## 2019-11-25 PROCEDURE — 12000002 HC ACUTE/MED SURGE SEMI-PRIVATE ROOM

## 2019-11-25 PROCEDURE — 86901 BLOOD TYPING SEROLOGIC RH(D): CPT

## 2019-11-25 RX ORDER — ACETAMINOPHEN 325 MG/1
650 TABLET ORAL EVERY 6 HOURS PRN
Status: DISCONTINUED | OUTPATIENT
Start: 2019-11-25 | End: 2019-11-25 | Stop reason: HOSPADM

## 2019-11-25 RX ORDER — OLANZAPINE 10 MG/2ML
10 INJECTION, POWDER, FOR SOLUTION INTRAMUSCULAR ONCE AS NEEDED
Status: DISCONTINUED | OUTPATIENT
Start: 2019-11-25 | End: 2019-11-25 | Stop reason: HOSPADM

## 2019-11-25 RX ORDER — IBUPROFEN 200 MG
1 TABLET ORAL DAILY
Status: DISCONTINUED | OUTPATIENT
Start: 2019-11-25 | End: 2019-11-25 | Stop reason: HOSPADM

## 2019-11-25 RX ORDER — SCOLOPAMINE TRANSDERMAL SYSTEM 1 MG/1
1 PATCH, EXTENDED RELEASE TRANSDERMAL
Status: DISCONTINUED | OUTPATIENT
Start: 2019-11-25 | End: 2019-11-25 | Stop reason: HOSPADM

## 2019-11-25 RX ORDER — GABAPENTIN 400 MG/1
800 CAPSULE ORAL 3 TIMES DAILY
Status: DISCONTINUED | OUTPATIENT
Start: 2019-11-25 | End: 2019-11-25 | Stop reason: HOSPADM

## 2019-11-25 RX ORDER — OXYCODONE AND ACETAMINOPHEN 10; 325 MG/1; MG/1
1 TABLET ORAL EVERY 4 HOURS PRN
Status: DISCONTINUED | OUTPATIENT
Start: 2019-11-25 | End: 2019-11-25 | Stop reason: HOSPADM

## 2019-11-25 RX ORDER — DIAZEPAM 5 MG/1
5 TABLET ORAL EVERY 6 HOURS PRN
Status: DISCONTINUED | OUTPATIENT
Start: 2019-11-25 | End: 2019-11-25 | Stop reason: HOSPADM

## 2019-11-25 RX ORDER — ONDANSETRON 2 MG/ML
8 INJECTION INTRAMUSCULAR; INTRAVENOUS EVERY 8 HOURS PRN
Status: DISCONTINUED | OUTPATIENT
Start: 2019-11-25 | End: 2019-11-25 | Stop reason: HOSPADM

## 2019-11-25 RX ORDER — HYDROXYZINE PAMOATE 25 MG/1
50 CAPSULE ORAL
Status: DISCONTINUED | OUTPATIENT
Start: 2019-11-25 | End: 2019-11-25 | Stop reason: HOSPADM

## 2019-11-25 RX ADMIN — NICOTINE 1 PATCH: 21 PATCH, EXTENDED RELEASE TRANSDERMAL at 12:11

## 2019-11-25 NOTE — ED PROVIDER NOTES
Encounter Date: 11/25/2019    SCRIBE #1 NOTE: I, Anni Faith , am scribing for, and in the presence of,  Dr. Eron Valente. I have scribed the following portions of the note - Other sections scribed: HPI, ROS, and PE.       History     Chief Complaint   Patient presents with    Neck Pain     here for admission , told to come in to be admitted for neck traction     Patient is a 39 year old male with a past medical history of chronic neck pain and heroin use who comes to the ED for admission for a corpectomy. The patient reports he was instructed by his neurosurgeon to come to the ED to be admitted for traction before his corpectomy on 11/27/19. He states he is a daily heroin user and used earlier today. The patient also endorses numbness and weakness in the first three digits of his bilateral hands and states his last CT was three months ago.     The history is provided by the patient and medical records.     Review of patient's allergies indicates:   Allergen Reactions    Vicodin [hydrocodone-acetaminophen] Hives     Past Medical History:   Diagnosis Date    Abscess, prevertebral soft tissue of neck 7/17/2019 7/16/2019 CT neck done at  Large fluid collection with a few foci of gas centered in the prevertebral soft tissues anterior to the C3-C7 levels, highly suspicious for prevertebral abscess.  The larynx is displaced anteriorly. Anterior fusion of C5-C6. Findings were discussed with Ramiro Carrasco MD on 7/16/2019 at 1530 hours.  Additional contrast enhanced CT images of the neck were recommended.    Chronic neck pain     Lumbar radiculopathy 7/2016 MRI with degeneration, mild central stenosis L3-4 2/8/2019 7/7/16 MRI L spine IMPRESSION:  1.  Mild discogenic predominant degenerative changes of the lower lumbar spine with mild central canal stenosis at L3-4. 2. Annular fissures at L3-4 and L4-5.     MRSA infection 8/12/2019    MSSA (methicillin susceptible Staphylococcus aureus) infection 7/25/2019     Nodule of left lobe of thyroid gland incidental on CT 7/2019 8 mm rec's thyroid US 7/18/2019    Opioid use disorder 2/8/2019    Retropharyngeal abscess 7/19/19 I+D 7/18/2019 7-  history of cervical fusion C5-6, IV drug abuse, neuromuscular disorder, hypertension, anxiety who presents with retropharyngeal abscess.  Evaluated by Neurosurgery and ENT as he still has hardware in his neck.  Patient went for I&D 7/19 with ENT. Surgical cultures grew Staph. Neurosurgery not recommending any acute intervention.  Blood cultures grew MSSA.  Was on vancomycin and continuo     Past Surgical History:   Procedure Laterality Date    cervicle fusion      EPIDURAL BLOCK INJECTION      lumbar x 2    INCISION AND DRAINAGE OF ABSCESS Right 7/19/2019    Procedure: INCISION AND DRAINAGE, ABSCESS, prevertebral;  Surgeon: Kole Cornelius MD;  Location: [facility] OR [address] [place] [address];  Service: ENT;  Laterality: Right;  Dr. Hinojosa to check hardware in cervial.cg     Family History   Problem Relation Age of Onset    No Known Problems Mother     No Known Problems Father     No Known Problems Sister     No Known Problems Sister      Social History     Tobacco Use    Smoking status: Current Every Day Smoker     Packs/day: 1.00     Types: Cigarettes    Smokeless tobacco: Former User   Substance Use Topics    Alcohol use: No    Drug use: Yes     Types: IV     Comment: pain pills and IV Heroin this am     Review of Systems    Physical Exam     Initial Vitals [11/25/19 0928]   BP Pulse Resp Temp SpO2   127/61 89 18 98.7 °F (37.1 °C) 96 %      MAP       --         Physical Exam    Nursing note and vitals reviewed.  Constitutional: He appears well-developed and well-nourished. He is not diaphoretic. No distress.   HENT:   Head: Normocephalic and atraumatic.   Eyes: EOM are normal. Pupils are equal, round, and reactive to light.   Neck: Normal range of motion. Neck supple.   Cardiovascular: Normal rate, regular rhythm and normal heart  sounds.   Pulmonary/Chest: Breath sounds normal. No respiratory distress.   Abdominal: Soft. Bowel sounds are normal. He exhibits no distension. There is no tenderness. There is no rebound.   Neurological: He is alert and oriented to person, place, and time.   Skin: Skin is warm and dry.   Psychiatric: He has a normal mood and affect.         ED Course   Procedures  Labs Reviewed   PROTIME-INR   APTT   BASIC METABOLIC PANEL   CBC W/ AUTO DIFFERENTIAL   PROTIME-INR   C-REACTIVE PROTEIN   PROCALCITONIN   SEDIMENTATION RATE   TOXICOLOGY SCREEN, URINE, RANDOM (COMPLIANCE)   URINALYSIS, REFLEX TO URINE CULTURE   TYPE & SCREEN          Imaging Results    None          Medical Decision Making:   Initial Assessment:   Per Neurosurgery note from October 2019:    Marcial Thomas has focal kyphotic deformity at C6-7 secondary to pathology fracture related to osteomyelitis s/p ABX. He also has significant spinal cord kinking and spinal instability. The natural history untreated is progression of the deformity, myelopathy and likely paraplegia/paralysis. He will need a front back 2 stage deformity surgery with a C6 and C7 corpectomy, followed by a PCF on a separate day. He will need to be admitted 48 hours before surgery for traction. We reviewed the R/B/AI/M of surgery in detail. I highlighted that the biggest surgical risk is his noncompliance with after care and continued IV drug use. Overall his risk of hardware failure, repeat infection, paralysis and death is high due to the continued IV drug use.  ED Management:  The patient was seen and examined.  I will page Neurosurgery for the admission.  Neurologically the patient is intact.  However the patient states he did heroin prior to coming in today    1004:  I spoke with Gianna Hidalgo who has the admit orders in for Dr Husain.             Scribe Attestation:   Scribe #1: I performed the above scribed service and the documentation accurately describes the services I  performed. I attest to the accuracy of the note.    Attending Attestation:           Physician Attestation for Scribe:      Comments: I, Dr. Valente, personally performed the services described in this documentation. All medical record entries made by the scribe were at my direction and in my presence.  I have reviewed the chart and agree that the record reflects my personal performance and is accurate and complete. Eron Valente DO  10:04 AM 11/25/2019                            Clinical Impression:       ICD-10-CM ICD-9-CM   1. Other secondary kyphosis, cervical region M40.12 737.41   2. Preoperative testing Z01.818 V72.84   3. Cervical kyphosis M40.202 737.10         Disposition:   Disposition: Admitted  Condition: Stable                     Eron Valente DO  11/25/19 1004

## 2019-11-25 NOTE — ED NOTES
Kristin with neurosurgery at bedside updating pt on risks of leaving and injury that could occur if pt leaves. Wife and patient at bedside and verbalized understanding of risks with leaving hospital ama. Pt requesting to leave the hospital and pt sighned ama form.
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
## 2019-11-25 NOTE — SUBJECTIVE & OBJECTIVE
Medications:  Continuous Infusions:  Scheduled Meds:  PRN Meds:     Review of Systems   Constitutional: no fever, chills or night sweats. No changes in weight   Eyes: no visual changes   ENT: no nasal congestion or sore throat   Respiratory: no cough or shortness of breath   Cardiovascular: no chest pain or palpitations   Gastrointestinal: no nausea or vomiting   Genitourinary: no hematuria or dysuria   Integument/Breast: no rash or pruritis   Hematologic/Lymphatic: no easy bruising or lymphadenopathy   Musculoskeletal: Positive for pain in his shoulders, neck, and under his right scapula.   Neurological: Positive for decreased fine motor control, dropping items, and numbness in his 1st three fingers of both hands, R>L.   Behavioral/Psych: no auditory or visual hallucinations   Endocrine: no heat or cold intolerance       Objective:     Weight: 63.5 kg (140 lb)  Body mass index is 20.67 kg/m².  Vital Signs (Most Recent):  Temp: 98.7 °F (37.1 °C) (11/25/19 0928)  Pulse: 89 (11/25/19 0928)  Resp: 18 (11/25/19 0928)  BP: 127/61 (11/25/19 0928)  SpO2: 96 % (11/25/19 0928) Vital Signs (24h Range):  Temp:  [98.7 °F (37.1 °C)] 98.7 °F (37.1 °C)  Pulse:  [89] 89  Resp:  [18] 18  SpO2:  [96 %] 96 %  BP: (127)/(61) 127/61                          Open Drain 07/19/19 1224 Right;Anterior Neck Penrose Other (see comments) (Active)       Neurosurgery Physical Exam  General: well developed, well nourished, poor dentition, no distress.   Head: normocephalic, atraumatic  Neurologic: Alert and oriented. Thought content appropriate.  GCS: Motor: 6/Verbal: 5/Eyes: 4 GCS Total: 15  Mental Status: Awake, Alert, Oriented x 4  Language: No aphasia  Speech: No dysarthria  Cranial nerves: face symmetric, tongue midline, CN II-XII grossly intact.   Eyes: pupils equal, round, reactive to light with accomodation, EOMI.   Pulmonary: normal respirations, no signs of respiratory distress  Abdomen: soft, non-distended, not tender to  palpation  Skin: Skin is warm, dry and intact.  Sensory: intact to light touch throughout  Motor Strength:Moves all extremities spontaneously with good tone.  Full strength upper and lower extremities. No abnormal movements seen.   Saldaña's: Negative.  Babinski's: Negative.  Clonus: Negative.  Finger-to-nose: intact bilaterally   Pronator drift: absent bilaterally           Significant Labs:  No results for input(s): GLU, NA, K, CL, CO2, BUN, CREATININE, CALCIUM, MG in the last 48 hours.  No results for input(s): WBC, HGB, HCT, PLT in the last 48 hours.  No results for input(s): LABPT, INR, APTT in the last 48 hours.  Microbiology Results (last 7 days)     ** No results found for the last 168 hours. **          Significant Diagnostics:  CT cervical spine 10/16/19:  I independently reviewed the imaging.     Postop anterior fusion C5-6 however there is destruction and compression of C6 into C7.  There is some retropulsion of the inferior aspect of the C6 vertebral body creating at least mild canal narrowing.  Some uncovering of the facets at this level.  Findings consistent with a history of disc osteomyelitis.    There is some prevertebral soft tissue thickening.  Residual fluid suspected.  If concern for prevertebral/retropharyngeal abscess CT neck with contrast would be beneficial.    Scattered cervical nodes but no convincing adenopathy.

## 2019-11-25 NOTE — ED NOTES
States he is supposed to be admitted for two days for neck traction and then neck surgery on Wednesday.  Arrived in c-collar.

## 2019-11-25 NOTE — ED NOTES
"Additional x2 attempts made for blood draw and IV access unsuccessful. Patient requesting to go outside to smoke a cigarette. Encouraged not to do so, refuses not to go outside to smoke. Ambulated out of ED to smoke against nursing advice, states "I'm going to come right back in."  "

## 2019-11-25 NOTE — PROGRESS NOTES
"Notified by RNChristy, that patient trying to leave AMA. Spoke to patient via RN spectra. Patient crying. States that he is having severe pain and anxiety, which is why he wants to leave. Informed him that I ordered medication for both but it was going to take time to get him more comfortable due to his heroin use this morning. Reminded him of our conversation 1 hour ago regarding anesthesia pain service and being patient while the correct regimen was found. He stated that he couldn't wait any longer.     I voiced the risks of leaving AMA which include but are not limited to: inability to walk leading to wheelchair dependency, permanent paralysis in arms/legs, and loss of bladder/bowel control. Informed him that once neurologic function is lost, it is often not regained, even if surgery is performed. He voiced understanding and then said "don't worry about me.... I am going to take care of it so you never have to see me again....no one will ever have to see me again".      Psych consulted for evaluation to determine if PEC is needed. Also consulted to determine if patient has capacity to make his own medical decisions which include refusing surgery.       Discussed with Dr. Husain  Please call with any questions      Kristin Hidalgo PA-C   Neurosurgery   Pager: [phone]    "

## 2019-11-25 NOTE — TELEPHONE ENCOUNTER
Relayed to pt's mother through phone staff that we are not permitted to provide pt's info over the phone to anyone except the POA.  Mother upset bc she wants the pt to have Sx.  Explained the decision to move forward w/Sx is the pt's decision.

## 2019-11-25 NOTE — ED NOTES
Pt reports he is going to leave if he does not get pain medicine. Pt reports he has been sitting in the er and they are not doing shit for him. Pt instructed that he is admitted to the ICU and is waiting for a bed. Pt instructed that he cannot received pain meds at this time because he used IV Heroin this morning. Pt Pt pulled IV out and threw IV on the floor. Security present at bedside. Kristin with neurosurgery called and is coming to speak with pt.
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
## 2019-11-25 NOTE — ED NOTES
Care assumed. PT sitting on stretcher and returned to ED. Per neurosurgery no peripheral IV at this time.

## 2019-11-25 NOTE — ED NOTES
Flako Foster with ICU to keep direct eye contact with pt until psych sees pt. Pt wife arrived at bedside updated on plan of care that pt is admitted.

## 2019-11-25 NOTE — TELEPHONE ENCOUNTER
Pt verified he is currently [facility] ED.  Dr. Husain notified. ----- Message from Daniel Ng sent at 11/25/2019  8:41 AM CST -----  Contact: Patient @ [phone]  Patient requesting a return call about clarity on today's adimission, pls advise

## 2019-11-25 NOTE — ED NOTES
Pt resting in bed. No distress noted. RR even and unlabored. Bed in low locked position. Side rails up x2.

## 2019-11-25 NOTE — PROGRESS NOTES
Attempted to go see patient but notified by ED staff that patient is not in his room. I attempted to call the patient's cell phone at the number listed in the chart. No answer. Left a message with the clinic contact info. Attempted to call his wife at the home number listed in the chart. No answer. Unable to leave a message.       Please call with any questions      Kristin Hidalgo PA-C   Neurosurgery   Pager: [phone]

## 2019-11-25 NOTE — ED NOTES
"Pt reports he wants to leave. Kristin with neuro surgery on phone speaking with pt and pt reports, "hes going to do something so no one has to deal with him if he cant stop this pain." security present.   "

## 2019-11-25 NOTE — ED NOTES
Kristin Hidalgo at bedside, patient going AMA. Patient understands risks of leaving, patient verbalized understandig

## 2019-11-25 NOTE — PROGRESS NOTES
"Notified by ED RN that patient again trying to leave AMA. They said that he removed his PIV and is threatening to call his .    Spoke with Psych who stated that they do not believe he is suicidal and they believe he has capacity to make his own medical decisions, including refusing care.     Went to ED to speak with patient to try to get him to stay. Wife at bedside, states that she wants patient to stay. Patient is adamant about leaving due to his pain and anxiety. Reminded patient that I was trying but I needed time. Informed him that he was admitted to the neuro ICU and anesthesia pain service was consulted. He kept perseverating on the fact that "nobody tried to help me in the past 6 months when I was dying in pain". Informed him that I was trying to help him now. He stated that he was no longer interested.     Reviewed the risks of leaving without treatment: loss of bladder/bowel control, paralysis of arms and legs, inability to walk, inability to feed/dress/care for himself, possible need for PEG/trach, etc. Informed him that if he changed his mind and wanted treatment but a spinal cord injury with function loss had already occurred, even with surgery, his function would likely not return. He voiced understanding. He denied having any questions.     Informed him that if he left AMA, he would be discharged from the [facility]. He voiced understanding. AMA form signed. Conversation witnessed by patient's wife, Shannon ED RN, Christy ED RN, and security.     Instructed patient to wear the cervical brace at all times to try to reduce his risk of spinal cord injury. He stated "ok" and walked out of the room.       Discussed with Dr. Husain  Please call with any questions      Kristin Hidalgo PA-C   Neurosurgery   Pager: [phone]    "
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
## 2019-11-26 ENCOUNTER — TELEPHONE (OUTPATIENT)
Dept: NEUROSURGERY | Facility: CLINIC | Age: 39
End: 2019-11-26

## 2019-11-26 NOTE — TELEPHONE ENCOUNTER
----- Message from Indu Zepeda sent at 11/26/2019  9:42 AM CST -----  Contact: Palak (mother) @ [phone]  Caller is asking to speak someone in Dr. Husain' office regarding the patient's surgery, asking if the patient comes in to the hospital will he have to wait in the ER for a bed, also wants to confirm that the surgery is still going to happen. Please call.

## 2019-11-26 NOTE — TELEPHONE ENCOUNTER
celia willis and discussed with Dr Husain. Surgery is CXLD for tomorrow, pt left the hospital AMA. Per Dr Husain, surgery will be re booked when further  details are worked out.

## 2019-11-26 NOTE — TELEPHONE ENCOUNTER
----- Message from Tomas Ellis sent at 11/26/2019  8:29 AM CST -----  Contact: Mary starr/ [facility] @ [phone], ext [phone]  Mary is calling to confirm if pt will complete surgery on 11/27/19

## 2020-08-15 NOTE — ASSESSMENT & PLAN NOTE
Pt has been using IV heroin and opioid for pain control.     Plan:  -- Tylenol 1g, dexamethasone 6 mg q6hr, Diazepam 5 mg q8, tramadol 50 mg QID schedule for pain  -- morphine 15 mg PO prn for break through pain.      no chest pain, no cough, and no shortness of breath.

## 2020-10-08 ENCOUNTER — TELEPHONE (OUTPATIENT)
Dept: FAMILY MEDICINE | Facility: CLINIC | Age: 40
End: 2020-10-08

## 2020-10-17 ENCOUNTER — TELEPHONE (OUTPATIENT)
Dept: PAIN MEDICINE | Facility: CLINIC | Age: 40
End: 2020-10-17

## 2020-10-17 NOTE — TELEPHONE ENCOUNTER
----- Message from Yanelis Mcpherson sent at 10/16/2020 10:39 AM CDT -----  Regarding: Cervial Disc Disorder  A Referral Order was faxed to me from Dr. Shannan Wayne, for above pt to see a Pain Management.  I am unable to schedule appointment because pt was seen in 8/2019 by another Pain Management Physician, Epic is stating pt needs to go back to that Physician.  Can this pt be seen by Dr Jordan, if so you will need to make the appointment, because I can't override the system.  Thank you

## 2020-10-19 NOTE — TELEPHONE ENCOUNTER
Spoke to patient, he said he was suppose to be referred somewhere else. He is not interested in injections for pain management. He states disregard referral.

## 2020-11-19 ENCOUNTER — PES CALL (OUTPATIENT)
Dept: ADMINISTRATIVE | Facility: CLINIC | Age: 40
End: 2020-11-19

## 2021-08-02 ENCOUNTER — TELEPHONE (OUTPATIENT)
Dept: NEUROSURGERY | Facility: CLINIC | Age: 41
End: 2021-08-02

## 2023-08-29 NOTE — TELEPHONE ENCOUNTER
Informed patient of charted recommendation for [facility]. Patient declined. Provider notified.   Nutrition Assessment   Reason for Consult/Assessment: Reassessment     Diagnosis and Hx: Reviewed     Pertinent Nutrition History: Reviewed.    Pertinent Nutrition Information: met with patient. Medications reviewed, noted lactulose, rifaximin, zinc sulfate. Labs reviewed (BG range WNL, elevated ammonia 85)                                 Diet Order: Regular, Oral nutrition supplement/snacks         Nutrition supplement/snacks: Ensure Max Protein ordered daily with dinner        Diet tolerance: Tolerating with good appetite/intakes recorded, % intake of meals, reports eating at least 2 meals per day and drinking Ensure. Appears to be usually ordering 3 meals per day with good intakes. LBM 8/28  Food Allergies: None known    Demographic/Anthropometrics Information  Gender: male  Patient Age: 55 year old  Height:   Ht Readings from Last 1 Encounters:   08/21/23 5' 6\" (1.676 m)      Weight:   Wt Readings from Last 1 Encounters:   08/29/23 84.8 kg      BMI:   BMI Readings from Last 1 Encounters:   08/29/23 30.18 kg/m²          % Weight Change: Weight stable per EMR since 2/1/23. Remains stable while admitted               TREATMENT PLAN: Monitoring & Interventions   Intervention: Meals and snacks, Nutrition supplement therapy         Meals & snacks: Continue regular diet. Encouraged intake of 3 meals per day with protein source.                                                           Nutrition supplement therapy: Continue Ensure Max Protein daily to help meet needs as patient reports liking.       Goal: Increase oral intake to >/equal 75% of meals and supplements   Intervention goal status: Goal achieved  Time frame to achieve goal: Ongoing     Dietitian will monitor: Anthropometric Measurements, Food, beverage, and nutrient intake, Biochemical data, medical tests, procedures            Nutrition Diagnosis / PES  Nutrition Diagnosis: Inadequate oral intake  Related to: Decreased intake, reported prior to  admission  As evidenced by: Diet history/recall      Primary Nutrition Diagnosis status: Active nutrition diagnosis

## 2023-08-31 ENCOUNTER — PATIENT OUTREACH (OUTPATIENT)
Dept: ADMINISTRATIVE | Facility: HOSPITAL | Age: 43
End: 2023-08-31
Payer: MEDICARE

## 2025-01-01 NOTE — CARE UPDATE
MRI C spine reviewed w/Dr. Hinojosa. Epidural component to abscess is nonoperative. Patient is not completely fused, ACDF hardware may not be removed.     Recommend washout w/ENT. Send cultures from OR and tailor abx appropriately.     Please call with questions.    Mi Rodriguez MD  Neurosurgery  [facility]     Calm/Appropriate

## (undated) DEVICE — SEE MEDLINE ITEM 152622

## (undated) DEVICE — BANDAGE CONFORM 3IN STRL

## (undated) DEVICE — STOCKINET 4INX48

## (undated) DEVICE — SUT ETHILON 3-0 PS2 18 BLK

## (undated) DEVICE — TRAY MINOR GEN SURG

## (undated) DEVICE — GOWN SURGICAL X-LARGE

## (undated) DEVICE — GAUZE SPONGE PEANUT STRL

## (undated) DEVICE — SUT VICRYL PLUS 3-0 SH 18IN

## (undated) DEVICE — SUT 2-0 SILK 30IN BLK BRAID

## (undated) DEVICE — SHEET EENT SPLIT

## (undated) DEVICE — SEE MEDLINE ITEM 157194

## (undated) DEVICE — CORD BIPOLAR 12 FOOT

## (undated) DEVICE — TUBE PENROSE DRAIN 12IN X 5/8I

## (undated) DEVICE — COVER LIGHT HANDLE 80/CA

## (undated) DEVICE — GAUZE FLUFF XXLG 36X36 2 PLY

## (undated) DEVICE — SUT MCRYL PLUS 4-0 PS2 27IN

## (undated) DEVICE — DRAPE STERI INSTRUMENT 1018

## (undated) DEVICE — SEE MEDLINE ITEM 157128

## (undated) DEVICE — NDL HYPO REG 25G X 1 1/2

## (undated) DEVICE — SUT 4-0 VICRYL / SH

## (undated) DEVICE — ELECTRODE REM PLYHSV RETURN 9

## (undated) DEVICE — ELECTRODE BLADE INSULATED 1 IN

## (undated) DEVICE — HOOK LONE STAR BLUNT 12MM

## (undated) DEVICE — TUBE PENROSE DRAIN 18IN X 3/8I